# Patient Record
Sex: MALE | Race: WHITE | NOT HISPANIC OR LATINO | URBAN - METROPOLITAN AREA
[De-identification: names, ages, dates, MRNs, and addresses within clinical notes are randomized per-mention and may not be internally consistent; named-entity substitution may affect disease eponyms.]

---

## 2018-09-27 PROBLEM — Z00.00 ENCOUNTER FOR PREVENTIVE HEALTH EXAMINATION: Status: ACTIVE | Noted: 2018-09-27

## 2022-11-23 ENCOUNTER — INPATIENT (INPATIENT)
Facility: HOSPITAL | Age: 62
LOS: 13 days | Discharge: HOME | DRG: 219 | End: 2022-12-07
Attending: THORACIC SURGERY (CARDIOTHORACIC VASCULAR SURGERY) | Admitting: THORACIC SURGERY (CARDIOTHORACIC VASCULAR SURGERY)
Payer: COMMERCIAL

## 2022-11-23 VITALS
HEART RATE: 107 BPM | SYSTOLIC BLOOD PRESSURE: 142 MMHG | OXYGEN SATURATION: 95 % | RESPIRATION RATE: 18 BRPM | TEMPERATURE: 97 F | DIASTOLIC BLOOD PRESSURE: 83 MMHG

## 2022-11-23 LAB
A1C WITH ESTIMATED AVERAGE GLUCOSE RESULT: 6.1 % — HIGH (ref 4–5.6)
ALBUMIN SERPL ELPH-MCNC: 4.2 G/DL — SIGNIFICANT CHANGE UP (ref 3.3–5)
ALP SERPL-CCNC: 78 U/L — SIGNIFICANT CHANGE UP (ref 40–120)
ALT FLD-CCNC: 53 U/L — HIGH (ref 10–45)
ANION GAP SERPL CALC-SCNC: 10 MMOL/L — SIGNIFICANT CHANGE UP (ref 5–17)
APPEARANCE UR: CLEAR — SIGNIFICANT CHANGE UP
APTT BLD: 35.8 SEC — HIGH (ref 27.5–35.5)
AST SERPL-CCNC: 36 U/L — SIGNIFICANT CHANGE UP (ref 10–40)
BASOPHILS # BLD AUTO: 0.04 K/UL — SIGNIFICANT CHANGE UP (ref 0–0.2)
BASOPHILS NFR BLD AUTO: 0.5 % — SIGNIFICANT CHANGE UP (ref 0–2)
BILIRUB SERPL-MCNC: 0.8 MG/DL — SIGNIFICANT CHANGE UP (ref 0.2–1.2)
BILIRUB UR-MCNC: NEGATIVE — SIGNIFICANT CHANGE UP
BLD GP AB SCN SERPL QL: NEGATIVE — SIGNIFICANT CHANGE UP
BLD GP AB SCN SERPL QL: NEGATIVE — SIGNIFICANT CHANGE UP
BUN SERPL-MCNC: 20 MG/DL — SIGNIFICANT CHANGE UP (ref 7–23)
CALCIUM SERPL-MCNC: 9.6 MG/DL — SIGNIFICANT CHANGE UP (ref 8.4–10.5)
CHLORIDE SERPL-SCNC: 106 MMOL/L — SIGNIFICANT CHANGE UP (ref 96–108)
CHOLEST SERPL-MCNC: 157 MG/DL — SIGNIFICANT CHANGE UP
CK MB CFR SERPL CALC: 2.8 NG/ML — SIGNIFICANT CHANGE UP (ref 0–6.7)
CK SERPL-CCNC: 169 U/L — SIGNIFICANT CHANGE UP (ref 30–200)
CO2 SERPL-SCNC: 24 MMOL/L — SIGNIFICANT CHANGE UP (ref 22–31)
COLOR SPEC: YELLOW — SIGNIFICANT CHANGE UP
CREAT SERPL-MCNC: 1.11 MG/DL — SIGNIFICANT CHANGE UP (ref 0.5–1.3)
DIFF PNL FLD: NEGATIVE — SIGNIFICANT CHANGE UP
EGFR: 74 ML/MIN/1.73M2 — SIGNIFICANT CHANGE UP
EOSINOPHIL # BLD AUTO: 0.07 K/UL — SIGNIFICANT CHANGE UP (ref 0–0.5)
EOSINOPHIL NFR BLD AUTO: 0.8 % — SIGNIFICANT CHANGE UP (ref 0–6)
ESTIMATED AVERAGE GLUCOSE: 128 MG/DL — HIGH (ref 68–114)
GLUCOSE SERPL-MCNC: 122 MG/DL — HIGH (ref 70–99)
GLUCOSE UR QL: NEGATIVE — SIGNIFICANT CHANGE UP
HCT VFR BLD CALC: 43.7 % — SIGNIFICANT CHANGE UP (ref 39–50)
HDLC SERPL-MCNC: 37 MG/DL — LOW
HGB BLD-MCNC: 14.6 G/DL — SIGNIFICANT CHANGE UP (ref 13–17)
IMM GRANULOCYTES NFR BLD AUTO: 0.4 % — SIGNIFICANT CHANGE UP (ref 0–0.9)
INR BLD: 1.37 — HIGH (ref 0.88–1.16)
KETONES UR-MCNC: NEGATIVE — SIGNIFICANT CHANGE UP
LEUKOCYTE ESTERASE UR-ACNC: NEGATIVE — SIGNIFICANT CHANGE UP
LIPID PNL WITH DIRECT LDL SERPL: 102 MG/DL — HIGH
LYMPHOCYTES # BLD AUTO: 1.08 K/UL — SIGNIFICANT CHANGE UP (ref 1–3.3)
LYMPHOCYTES # BLD AUTO: 13 % — SIGNIFICANT CHANGE UP (ref 13–44)
MCHC RBC-ENTMCNC: 33.2 PG — SIGNIFICANT CHANGE UP (ref 27–34)
MCHC RBC-ENTMCNC: 33.4 GM/DL — SIGNIFICANT CHANGE UP (ref 32–36)
MCV RBC AUTO: 99.3 FL — SIGNIFICANT CHANGE UP (ref 80–100)
MONOCYTES # BLD AUTO: 0.52 K/UL — SIGNIFICANT CHANGE UP (ref 0–0.9)
MONOCYTES NFR BLD AUTO: 6.3 % — SIGNIFICANT CHANGE UP (ref 2–14)
NEUTROPHILS # BLD AUTO: 6.54 K/UL — SIGNIFICANT CHANGE UP (ref 1.8–7.4)
NEUTROPHILS NFR BLD AUTO: 79 % — HIGH (ref 43–77)
NITRITE UR-MCNC: NEGATIVE — SIGNIFICANT CHANGE UP
NON HDL CHOLESTEROL: 120 MG/DL — SIGNIFICANT CHANGE UP
NRBC # BLD: 0 /100 WBCS — SIGNIFICANT CHANGE UP (ref 0–0)
NT-PROBNP SERPL-SCNC: 626 PG/ML — HIGH (ref 0–300)
PH UR: 5.5 — SIGNIFICANT CHANGE UP (ref 5–8)
PLATELET # BLD AUTO: 189 K/UL — SIGNIFICANT CHANGE UP (ref 150–400)
POTASSIUM SERPL-MCNC: 4.3 MMOL/L — SIGNIFICANT CHANGE UP (ref 3.5–5.3)
POTASSIUM SERPL-SCNC: 4.3 MMOL/L — SIGNIFICANT CHANGE UP (ref 3.5–5.3)
PROT SERPL-MCNC: 6.8 G/DL — SIGNIFICANT CHANGE UP (ref 6–8.3)
PROT UR-MCNC: NEGATIVE MG/DL — SIGNIFICANT CHANGE UP
PROTHROM AB SERPL-ACNC: 16.3 SEC — HIGH (ref 10.5–13.4)
RBC # BLD: 4.4 M/UL — SIGNIFICANT CHANGE UP (ref 4.2–5.8)
RBC # FLD: 14.5 % — SIGNIFICANT CHANGE UP (ref 10.3–14.5)
RH IG SCN BLD-IMP: POSITIVE — SIGNIFICANT CHANGE UP
RH IG SCN BLD-IMP: POSITIVE — SIGNIFICANT CHANGE UP
SARS-COV-2 RNA SPEC QL NAA+PROBE: SIGNIFICANT CHANGE UP
SODIUM SERPL-SCNC: 140 MMOL/L — SIGNIFICANT CHANGE UP (ref 135–145)
SP GR SPEC: >=1.03 — SIGNIFICANT CHANGE UP (ref 1–1.03)
TRIGL SERPL-MCNC: 91 MG/DL — SIGNIFICANT CHANGE UP
TROPONIN T SERPL-MCNC: 0.01 NG/ML — SIGNIFICANT CHANGE UP (ref 0–0.01)
TSH SERPL-MCNC: 3.09 UIU/ML — SIGNIFICANT CHANGE UP (ref 0.27–4.2)
UROBILINOGEN FLD QL: 0.2 E.U./DL — SIGNIFICANT CHANGE UP
WBC # BLD: 8.28 K/UL — SIGNIFICANT CHANGE UP (ref 3.8–10.5)
WBC # FLD AUTO: 8.28 K/UL — SIGNIFICANT CHANGE UP (ref 3.8–10.5)

## 2022-11-23 PROCEDURE — 93010 ELECTROCARDIOGRAM REPORT: CPT

## 2022-11-23 PROCEDURE — 71045 X-RAY EXAM CHEST 1 VIEW: CPT | Mod: 26

## 2022-11-23 PROCEDURE — 99223 1ST HOSP IP/OBS HIGH 75: CPT

## 2022-11-23 RX ORDER — ISOSORBIDE MONONITRATE 60 MG/1
30 TABLET, EXTENDED RELEASE ORAL DAILY
Refills: 0 | Status: DISCONTINUED | OUTPATIENT
Start: 2022-11-23 | End: 2022-11-24

## 2022-11-23 RX ORDER — FUROSEMIDE 40 MG
40 TABLET ORAL DAILY
Refills: 0 | Status: DISCONTINUED | OUTPATIENT
Start: 2022-11-24 | End: 2022-11-24

## 2022-11-23 RX ORDER — POLYETHYLENE GLYCOL 3350 17 G/17G
17 POWDER, FOR SOLUTION ORAL DAILY
Refills: 0 | Status: DISCONTINUED | OUTPATIENT
Start: 2022-11-23 | End: 2022-11-23

## 2022-11-23 RX ORDER — ATORVASTATIN CALCIUM 80 MG/1
80 TABLET, FILM COATED ORAL AT BEDTIME
Refills: 0 | Status: DISCONTINUED | OUTPATIENT
Start: 2022-11-23 | End: 2022-11-28

## 2022-11-23 RX ORDER — POTASSIUM CHLORIDE 20 MEQ
20 PACKET (EA) ORAL DAILY
Refills: 0 | Status: DISCONTINUED | OUTPATIENT
Start: 2022-11-24 | End: 2022-11-25

## 2022-11-23 RX ORDER — METOPROLOL TARTRATE 50 MG
12.5 TABLET ORAL EVERY 12 HOURS
Refills: 0 | Status: DISCONTINUED | OUTPATIENT
Start: 2022-11-23 | End: 2022-11-23

## 2022-11-23 RX ORDER — PANTOPRAZOLE SODIUM 20 MG/1
40 TABLET, DELAYED RELEASE ORAL
Refills: 0 | Status: DISCONTINUED | OUTPATIENT
Start: 2022-11-23 | End: 2022-11-28

## 2022-11-23 RX ORDER — SODIUM CHLORIDE 9 MG/ML
3 INJECTION INTRAMUSCULAR; INTRAVENOUS; SUBCUTANEOUS EVERY 8 HOURS
Refills: 0 | Status: DISCONTINUED | OUTPATIENT
Start: 2022-11-23 | End: 2022-11-28

## 2022-11-23 RX ORDER — ASPIRIN/CALCIUM CARB/MAGNESIUM 324 MG
81 TABLET ORAL DAILY
Refills: 0 | Status: DISCONTINUED | OUTPATIENT
Start: 2022-11-23 | End: 2022-11-28

## 2022-11-23 RX ORDER — SENNA PLUS 8.6 MG/1
2 TABLET ORAL AT BEDTIME
Refills: 0 | Status: DISCONTINUED | OUTPATIENT
Start: 2022-11-23 | End: 2022-11-23

## 2022-11-23 RX ORDER — ENOXAPARIN SODIUM 100 MG/ML
120 INJECTION SUBCUTANEOUS EVERY 12 HOURS
Refills: 0 | Status: DISCONTINUED | OUTPATIENT
Start: 2022-11-23 | End: 2022-11-24

## 2022-11-23 RX ORDER — INFLUENZA VIRUS VACCINE 15; 15; 15; 15 UG/.5ML; UG/.5ML; UG/.5ML; UG/.5ML
0.5 SUSPENSION INTRAMUSCULAR ONCE
Refills: 0 | Status: DISCONTINUED | OUTPATIENT
Start: 2022-11-23 | End: 2022-11-28

## 2022-11-23 RX ORDER — ALPRAZOLAM 0.25 MG
0.5 TABLET ORAL AT BEDTIME
Refills: 0 | Status: DISCONTINUED | OUTPATIENT
Start: 2022-11-23 | End: 2022-11-24

## 2022-11-23 RX ORDER — METOPROLOL TARTRATE 50 MG
25 TABLET ORAL EVERY 12 HOURS
Refills: 0 | Status: DISCONTINUED | OUTPATIENT
Start: 2022-11-23 | End: 2022-11-25

## 2022-11-23 RX ORDER — HEPARIN SODIUM 5000 [USP'U]/ML
5000 INJECTION INTRAVENOUS; SUBCUTANEOUS EVERY 8 HOURS
Refills: 0 | Status: DISCONTINUED | OUTPATIENT
Start: 2022-11-23 | End: 2022-11-23

## 2022-11-23 RX ADMIN — ISOSORBIDE MONONITRATE 30 MILLIGRAM(S): 60 TABLET, EXTENDED RELEASE ORAL at 23:11

## 2022-11-23 RX ADMIN — ATORVASTATIN CALCIUM 80 MILLIGRAM(S): 80 TABLET, FILM COATED ORAL at 23:11

## 2022-11-23 RX ADMIN — Medication 25 MILLIGRAM(S): at 23:15

## 2022-11-23 RX ADMIN — SODIUM CHLORIDE 3 MILLILITER(S): 9 INJECTION INTRAMUSCULAR; INTRAVENOUS; SUBCUTANEOUS at 22:26

## 2022-11-23 RX ADMIN — Medication 0.5 MILLIGRAM(S): at 23:48

## 2022-11-23 NOTE — PATIENT PROFILE ADULT - FALL HARM RISK - HARM RISK INTERVENTIONS

## 2022-11-24 DIAGNOSIS — I34.0 NONRHEUMATIC MITRAL (VALVE) INSUFFICIENCY: ICD-10-CM

## 2022-11-24 DIAGNOSIS — I10 ESSENTIAL (PRIMARY) HYPERTENSION: ICD-10-CM

## 2022-11-24 DIAGNOSIS — I25.10 ATHEROSCLEROTIC HEART DISEASE OF NATIVE CORONARY ARTERY WITHOUT ANGINA PECTORIS: ICD-10-CM

## 2022-11-24 DIAGNOSIS — F41.9 ANXIETY DISORDER, UNSPECIFIED: ICD-10-CM

## 2022-11-24 DIAGNOSIS — E66.01 MORBID (SEVERE) OBESITY DUE TO EXCESS CALORIES: ICD-10-CM

## 2022-11-24 DIAGNOSIS — I50.21 ACUTE SYSTOLIC (CONGESTIVE) HEART FAILURE: ICD-10-CM

## 2022-11-24 DIAGNOSIS — I21.4 NON-ST ELEVATION (NSTEMI) MYOCARDIAL INFARCTION: ICD-10-CM

## 2022-11-24 DIAGNOSIS — I48.92 UNSPECIFIED ATRIAL FLUTTER: ICD-10-CM

## 2022-11-24 LAB
ANION GAP SERPL CALC-SCNC: 6 MMOL/L — SIGNIFICANT CHANGE UP (ref 5–17)
APTT BLD: 51.5 SEC — HIGH (ref 27.5–35.5)
APTT BLD: 53.4 SEC — HIGH (ref 27.5–35.5)
BUN SERPL-MCNC: 20 MG/DL — SIGNIFICANT CHANGE UP (ref 7–23)
CALCIUM SERPL-MCNC: 9.3 MG/DL — SIGNIFICANT CHANGE UP (ref 8.4–10.5)
CHLORIDE SERPL-SCNC: 105 MMOL/L — SIGNIFICANT CHANGE UP (ref 96–108)
CO2 SERPL-SCNC: 29 MMOL/L — SIGNIFICANT CHANGE UP (ref 22–31)
CREAT SERPL-MCNC: 1.24 MG/DL — SIGNIFICANT CHANGE UP (ref 0.5–1.3)
EGFR: 65 ML/MIN/1.73M2 — SIGNIFICANT CHANGE UP
GLUCOSE BLDC GLUCOMTR-MCNC: 115 MG/DL — HIGH (ref 70–99)
GLUCOSE BLDC GLUCOMTR-MCNC: 119 MG/DL — HIGH (ref 70–99)
GLUCOSE SERPL-MCNC: 115 MG/DL — HIGH (ref 70–99)
HCT VFR BLD CALC: 41.4 % — SIGNIFICANT CHANGE UP (ref 39–50)
HGB BLD-MCNC: 13.9 G/DL — SIGNIFICANT CHANGE UP (ref 13–17)
MAGNESIUM SERPL-MCNC: 2.1 MG/DL — SIGNIFICANT CHANGE UP (ref 1.6–2.6)
MCHC RBC-ENTMCNC: 33.3 PG — SIGNIFICANT CHANGE UP (ref 27–34)
MCHC RBC-ENTMCNC: 33.6 GM/DL — SIGNIFICANT CHANGE UP (ref 32–36)
MCV RBC AUTO: 99 FL — SIGNIFICANT CHANGE UP (ref 80–100)
NRBC # BLD: 0 /100 WBCS — SIGNIFICANT CHANGE UP (ref 0–0)
PHOSPHATE SERPL-MCNC: 4.1 MG/DL — SIGNIFICANT CHANGE UP (ref 2.5–4.5)
PLATELET # BLD AUTO: 167 K/UL — SIGNIFICANT CHANGE UP (ref 150–400)
POTASSIUM SERPL-MCNC: 4.6 MMOL/L — SIGNIFICANT CHANGE UP (ref 3.5–5.3)
POTASSIUM SERPL-SCNC: 4.6 MMOL/L — SIGNIFICANT CHANGE UP (ref 3.5–5.3)
RBC # BLD: 4.18 M/UL — LOW (ref 4.2–5.8)
RBC # FLD: 14.6 % — HIGH (ref 10.3–14.5)
SODIUM SERPL-SCNC: 140 MMOL/L — SIGNIFICANT CHANGE UP (ref 135–145)
WBC # BLD: 7.97 K/UL — SIGNIFICANT CHANGE UP (ref 3.8–10.5)
WBC # FLD AUTO: 7.97 K/UL — SIGNIFICANT CHANGE UP (ref 3.8–10.5)

## 2022-11-24 PROCEDURE — 93880 EXTRACRANIAL BILAT STUDY: CPT | Mod: 26

## 2022-11-24 RX ORDER — DEXTROSE 50 % IN WATER 50 %
25 SYRINGE (ML) INTRAVENOUS ONCE
Refills: 0 | Status: DISCONTINUED | OUTPATIENT
Start: 2022-11-24 | End: 2022-11-28

## 2022-11-24 RX ORDER — SODIUM CHLORIDE 9 MG/ML
1000 INJECTION, SOLUTION INTRAVENOUS
Refills: 0 | Status: DISCONTINUED | OUTPATIENT
Start: 2022-11-24 | End: 2022-11-28

## 2022-11-24 RX ORDER — AMIODARONE HYDROCHLORIDE 400 MG/1
400 TABLET ORAL EVERY 8 HOURS
Refills: 0 | Status: COMPLETED | OUTPATIENT
Start: 2022-11-24 | End: 2022-11-28

## 2022-11-24 RX ORDER — SENNA PLUS 8.6 MG/1
2 TABLET ORAL AT BEDTIME
Refills: 0 | Status: DISCONTINUED | OUTPATIENT
Start: 2022-11-24 | End: 2022-11-28

## 2022-11-24 RX ORDER — AMIODARONE HYDROCHLORIDE 400 MG/1
200 TABLET ORAL DAILY
Refills: 0 | Status: DISCONTINUED | OUTPATIENT
Start: 2022-11-28 | End: 2022-11-28

## 2022-11-24 RX ORDER — FUROSEMIDE 40 MG
20 TABLET ORAL EVERY 12 HOURS
Refills: 0 | Status: DISCONTINUED | OUTPATIENT
Start: 2022-11-24 | End: 2022-11-24

## 2022-11-24 RX ORDER — INSULIN LISPRO 100/ML
VIAL (ML) SUBCUTANEOUS
Refills: 0 | Status: DISCONTINUED | OUTPATIENT
Start: 2022-11-24 | End: 2022-11-28

## 2022-11-24 RX ORDER — ALPRAZOLAM 0.25 MG
0.5 TABLET ORAL AT BEDTIME
Refills: 0 | Status: DISCONTINUED | OUTPATIENT
Start: 2022-11-24 | End: 2022-11-28

## 2022-11-24 RX ORDER — GLUCAGON INJECTION, SOLUTION 0.5 MG/.1ML
1 INJECTION, SOLUTION SUBCUTANEOUS ONCE
Refills: 0 | Status: DISCONTINUED | OUTPATIENT
Start: 2022-11-24 | End: 2022-11-28

## 2022-11-24 RX ORDER — AMIODARONE HYDROCHLORIDE 400 MG/1
150 TABLET ORAL ONCE
Refills: 0 | Status: COMPLETED | OUTPATIENT
Start: 2022-11-24 | End: 2022-11-24

## 2022-11-24 RX ORDER — HEPARIN SODIUM 5000 [USP'U]/ML
1200 INJECTION INTRAVENOUS; SUBCUTANEOUS
Qty: 25000 | Refills: 0 | Status: DISCONTINUED | OUTPATIENT
Start: 2022-11-24 | End: 2022-11-28

## 2022-11-24 RX ORDER — AMIODARONE HYDROCHLORIDE 400 MG/1
TABLET ORAL
Refills: 0 | Status: DISCONTINUED | OUTPATIENT
Start: 2022-11-24 | End: 2022-11-28

## 2022-11-24 RX ORDER — DEXTROSE 50 % IN WATER 50 %
15 SYRINGE (ML) INTRAVENOUS ONCE
Refills: 0 | Status: DISCONTINUED | OUTPATIENT
Start: 2022-11-24 | End: 2022-11-28

## 2022-11-24 RX ORDER — FUROSEMIDE 40 MG
20 TABLET ORAL EVERY 12 HOURS
Refills: 0 | Status: DISCONTINUED | OUTPATIENT
Start: 2022-11-24 | End: 2022-11-26

## 2022-11-24 RX ORDER — DEXTROSE 50 % IN WATER 50 %
12.5 SYRINGE (ML) INTRAVENOUS ONCE
Refills: 0 | Status: DISCONTINUED | OUTPATIENT
Start: 2022-11-24 | End: 2022-11-28

## 2022-11-24 RX ORDER — MAGNESIUM OXIDE 400 MG ORAL TABLET 241.3 MG
400 TABLET ORAL DAILY
Refills: 0 | Status: DISCONTINUED | OUTPATIENT
Start: 2022-11-24 | End: 2022-11-28

## 2022-11-24 RX ADMIN — ENOXAPARIN SODIUM 120 MILLIGRAM(S): 100 INJECTION SUBCUTANEOUS at 00:44

## 2022-11-24 RX ADMIN — HEPARIN SODIUM 12.5 UNIT(S)/HR: 5000 INJECTION INTRAVENOUS; SUBCUTANEOUS at 18:39

## 2022-11-24 RX ADMIN — Medication 25 MILLIGRAM(S): at 22:03

## 2022-11-24 RX ADMIN — Medication 25 MILLIGRAM(S): at 10:01

## 2022-11-24 RX ADMIN — PANTOPRAZOLE SODIUM 40 MILLIGRAM(S): 20 TABLET, DELAYED RELEASE ORAL at 06:51

## 2022-11-24 RX ADMIN — Medication 0.5 MILLIGRAM(S): at 10:26

## 2022-11-24 RX ADMIN — SODIUM CHLORIDE 3 MILLILITER(S): 9 INJECTION INTRAMUSCULAR; INTRAVENOUS; SUBCUTANEOUS at 06:47

## 2022-11-24 RX ADMIN — ATORVASTATIN CALCIUM 80 MILLIGRAM(S): 80 TABLET, FILM COATED ORAL at 22:03

## 2022-11-24 RX ADMIN — Medication 0.5 MILLIGRAM(S): at 22:02

## 2022-11-24 RX ADMIN — HEPARIN SODIUM 12 UNIT(S)/HR: 5000 INJECTION INTRAVENOUS; SUBCUTANEOUS at 12:01

## 2022-11-24 RX ADMIN — Medication 20 MILLIGRAM(S): at 17:43

## 2022-11-24 RX ADMIN — SODIUM CHLORIDE 3 MILLILITER(S): 9 INJECTION INTRAMUSCULAR; INTRAVENOUS; SUBCUTANEOUS at 21:12

## 2022-11-24 RX ADMIN — Medication 20 MILLIGRAM(S): at 10:35

## 2022-11-24 RX ADMIN — AMIODARONE HYDROCHLORIDE 133.33 MILLIGRAM(S): 400 TABLET ORAL at 11:07

## 2022-11-24 RX ADMIN — Medication 81 MILLIGRAM(S): at 11:43

## 2022-11-24 RX ADMIN — SODIUM CHLORIDE 3 MILLILITER(S): 9 INJECTION INTRAMUSCULAR; INTRAVENOUS; SUBCUTANEOUS at 13:56

## 2022-11-24 RX ADMIN — AMIODARONE HYDROCHLORIDE 400 MILLIGRAM(S): 400 TABLET ORAL at 22:03

## 2022-11-24 RX ADMIN — AMIODARONE HYDROCHLORIDE 400 MILLIGRAM(S): 400 TABLET ORAL at 14:55

## 2022-11-24 NOTE — H&P ADULT - NSHPLABSRESULTS_GEN_ALL_CORE
11-23    140  |  106  |  20  ----------------------------<  122<H>  4.3   |  24  |  1.11    Ca    9.6      23 Nov 2022 18:53    TPro  6.8  /  Alb  4.2  /  TBili  0.8  /  DBili  x   /  AST  36  /  ALT  53<H>  /  AlkPhos  78  11-23      CBC Full  -  ( 23 Nov 2022 18:53 )  WBC Count : 8.28 K/uL  RBC Count : 4.40 M/uL  Hemoglobin : 14.6 g/dL  Hematocrit : 43.7 %  Platelet Count - Automated : 189 K/uL  Mean Cell Volume : 99.3 fl  Mean Cell Hemoglobin : 33.2 pg  Mean Cell Hemoglobin Concentration : 33.4 gm/dL  Auto Neutrophil # : 6.54 K/uL  Auto Lymphocyte # : 1.08 K/uL  Auto Monocyte # : 0.52 K/uL  Auto Eosinophil # : 0.07 K/uL  Auto Basophil # : 0.04 K/uL  Auto Neutrophil % : 79.0 %  Auto Lymphocyte % : 13.0 %  Auto Monocyte % : 6.3 %  Auto Eosinophil % : 0.8 %  Auto Basophil % : 0.5 %

## 2022-11-24 NOTE — H&P ADULT - HISTORY OF PRESENT ILLNESS
61 y/o male PMH anxiety, morbid obesity, "fast heart rate" (not on blood thinners) who c/o SOB, N&V, night sweats and  palpitations for the last few days which progressively worsened. PT was brought in by EMS to [facility] found to be in rapid atrial fibrillation -200, HTN with expiratory wheezing. Given Cardizem and albuterol which decreased to HR and wheezing. Placed on heparin drip at that time and given lasix after CXR showed pulmonary edema. Troponin found to be elevated diagnosed with NSTEMI. Subsequent cardiac cath showed 2 vessel CAD mLAD 60% mCX 50% with EF 40% and medical management recommended. Subsequent OLEG showed severe central MR and was transferred to [facility] under Dr. Reilly for surgical evaluation and management.

## 2022-11-24 NOTE — H&P ADULT - PROBLEM SELECTOR PLAN 1
admit to 9L under Dr. Reilly  - full labs  - full dose Lovenox for AF and ACS  - EP consultation  - heart failure consultation  - pre-op for mitral valve repair/replacement and possible cryomaze/CHANDRIKA occlusion

## 2022-11-24 NOTE — PROGRESS NOTE ADULT - SUBJECTIVE AND OBJECTIVE BOX
Patient discussed on morning rounds with Dr. Rosales     Operation / Date: severe MR emiliana, nonobstructive CAD    SUBJECTIVE ASSESSMENT:  62y Male seen and examined. Patient worried about upcoming procedure and testing, however, understands importance of completing preop workup.       Vital Signs Last 24 Hrs  T(C): 36.5 (24 Nov 2022 09:28), Max: 36.5 (24 Nov 2022 09:28)  T(F): 97.7 (24 Nov 2022 09:28), Max: 97.7 (24 Nov 2022 09:28)  HR: 102 (24 Nov 2022 13:10) (96 - 122)  BP: 107/69 (24 Nov 2022 13:10) (107/69 - 160/87)  BP(mean): 82 (24 Nov 2022 13:10) (82 - 118)  RR: 18 (24 Nov 2022 13:10) (18 - 18)  SpO2: 95% (24 Nov 2022 13:10) (93% - 95%)    Parameters below as of 24 Nov 2022 13:10  Patient On (Oxygen Delivery Method): room air      I&O's Detail    23 Nov 2022 07:01  -  24 Nov 2022 07:00  --------------------------------------------------------  IN:  Total IN: 0 mL    OUT:    Voided (mL): 600 mL  Total OUT: 600 mL    Total NET: -600 mL      24 Nov 2022 07:01  -  24 Nov 2022 16:39  --------------------------------------------------------  IN:    Heparin: 60 mL    IV PiggyBack: 100 mL  Total IN: 160 mL    OUT:    Voided (mL): 1450 mL  Total OUT: 1450 mL    Total NET: -1290 mL      CHEST TUBE: no  JUSTUS DRAIN:  No.  EPICARDIAL WIRES: No.  TIE DOWNS: No.  HUERTA: No.    PHYSICAL EXAM:    General: NAD, sitting comfortably in chair, conversing appropriately  Neurological: alert and oriented, UE and LE strength equal b/l, facial symmetry present  Cardiovascular: RRR, Clear S1 and S2, +systolic murmur  Respiratory: chest expansion symmetrical, CTA b/l, no wheezing noted  Gastrointestinal: +BS, soft, NT, ND  Extremities: moving spontaneously, no calf tenderness or edema.  Vascular: warm, well perfused. DP/PT pulses palpable b/l.  Incisions: none    LABS:                        13.9   7.97  )-----------( 167      ( 24 Nov 2022 06:52 )             41.4       COUMADIN: no    PT/INR - ( 23 Nov 2022 18:53 )   PT: 16.3 sec;   INR: 1.37          PTT - ( 23 Nov 2022 18:53 )  PTT:35.8 sec    11-24    140  |  105  |  20  ----------------------------<  115<H>  4.6   |  29  |  1.24    Ca    9.3      24 Nov 2022 06:52  Phos  4.1     11-24  Mg     2.1     11-24    TPro  6.8  /  Alb  4.2  /  TBili  0.8  /  DBili  x   /  AST  36  /  ALT  53<H>  /  AlkPhos  78  11-23      Urinalysis Basic - ( 23 Nov 2022 21:17 )    Color: Yellow / Appearance: Clear / SG: >=1.030 / pH: x  Gluc: x / Ketone: NEGATIVE  / Bili: Negative / Urobili: 0.2 E.U./dL   Blood: x / Protein: NEGATIVE mg/dL / Nitrite: NEGATIVE   Leuk Esterase: NEGATIVE / RBC: x / WBC x   Sq Epi: x / Non Sq Epi: x / Bacteria: x        MEDICATIONS  (STANDING):  aMIOdarone    Tablet   Oral   aMIOdarone    Tablet 400 milliGRAM(s) Oral every 8 hours  aspirin  chewable 81 milliGRAM(s) Oral daily  atorvastatin 80 milliGRAM(s) Oral at bedtime  furosemide   Injectable 20 milliGRAM(s) IV Push every 12 hours  heparin  Infusion 1200 Unit(s)/Hr (12 mL/Hr) IV Continuous <Continuous>  influenza   Vaccine 0.5 milliLiter(s) IntraMuscular once  magnesium oxide 400 milliGRAM(s) Oral daily  metoprolol tartrate 25 milliGRAM(s) Oral every 12 hours  pantoprazole    Tablet 40 milliGRAM(s) Oral before breakfast  potassium chloride    Tablet ER 20 milliEquivalent(s) Oral daily  sodium chloride 0.9% lock flush 3 milliLiter(s) IV Push every 8 hours    MEDICATIONS  (PRN):  ALPRAZolam 0.5 milliGRAM(s) Oral at bedtime PRN anxiety/sleep        RADIOLOGY & ADDITIONAL TESTS:    < from: US Duplex Carotid Arteries Complete, Bilateral (11.24.22 @ 15:04) >  Antegrade flow is noted within both vertebral arteries.    IMPRESSION: No significant hemodynamic stenosisof either carotid artery.    Measurement of carotid stenosis is based on velocity parameters that   correlate the residual internal carotid diameter with that of the more   distal vessel in accordance with a method such as the [place] American   Symptomatic Carotid Endarterectomy Trial (NASCET).    < end of copied text >  < from: Xray Chest 1 View AP/PA (11.23.22 @ 19:19) >  IMPRESSION:  Clearlungs.    < end of copied text >

## 2022-11-24 NOTE — PROGRESS NOTE ADULT - ASSESSMENT
63 y/o male PMH anxiety, morbid obesity, "fast heart rate" (not on blood thinners) who c/o SOB, N&V, night sweats and  palpitations for the last few days which progressively worsened. Patient was brought by EMS to [facility] and found to be in rapid atrial fibrillation -200, HTN with expiratory wheezing. Given Cardizem and albuterol with improvement. Placed on heparin drip at that time and given lasix after CXR showed pulmonary edema. Troponin found to be elevated, ruled in for NSTEMI. Cardiac cath completed and showed 2 vessel CAD mLAD 60% mCX 50% with EF 40% and medical management recommended. OLEG at OSH showed severe central MR. Patient was transferred to [facility] under the care of Dr. Reilly for surgical evaluation of his MR. Heparin gtt started, IV lasix started, amio loaded. Plan for TTE tomorrow. Likely OR on Monday for MV repair/replacement.    Plan:    Neurovascular:   -no current pain  anxiety  -continue xanax    Cardiovascular:   severe MR  -repeat TTE tomorrow  -continue IV lasix  afib  -continue heparin gtt  -continue PO amio load  -Hemodynamically stable.   -Monitor: BP, HR, tele    Respiratory:   -Oxygenating well on room air  -Encourage continued use of IS 10x/hr and frequent ambulation    GI:  -GI PPX: continue protonix  -PO Diet  -Bowel Regimen: start senna    Renal / :  -Continue to monitor renal function: BUN/Cr: 20/1.24  -Monitor I/O's daily     Endocrine:    DM  -A1c: 6.1  -start MISS  no hx thyroid disease  -TSH: 3.090    Hematologic:  -CBC: H/H- 13.9/41.4  -Coagulation Panel.    ID:  -Temperature: afebrile  -CBC: WBC- 7.97  -Continue to observe for SIRS/Sepsis Syndrome.    Prophylaxis:  -DVT prophylaxis with heparin gtt  -Continue with SCD's b/l while patient is at rest     Disposition:  -OR evaluation underway

## 2022-11-25 PROBLEM — R00.2 PALPITATIONS: Chronic | Status: ACTIVE | Noted: 2022-11-24

## 2022-11-25 PROBLEM — F41.9 ANXIETY DISORDER, UNSPECIFIED: Chronic | Status: ACTIVE | Noted: 2022-11-24

## 2022-11-25 PROBLEM — E66.01 MORBID (SEVERE) OBESITY DUE TO EXCESS CALORIES: Chronic | Status: ACTIVE | Noted: 2022-11-24

## 2022-11-25 LAB
ANION GAP SERPL CALC-SCNC: 10 MMOL/L — SIGNIFICANT CHANGE UP (ref 5–17)
APTT BLD: 63.2 SEC — HIGH (ref 27.5–35.5)
APTT BLD: 64.4 SEC — HIGH (ref 27.5–35.5)
APTT BLD: 68.4 SEC — HIGH (ref 27.5–35.5)
BUN SERPL-MCNC: 17 MG/DL — SIGNIFICANT CHANGE UP (ref 7–23)
CALCIUM SERPL-MCNC: 9.1 MG/DL — SIGNIFICANT CHANGE UP (ref 8.4–10.5)
CHLORIDE SERPL-SCNC: 104 MMOL/L — SIGNIFICANT CHANGE UP (ref 96–108)
CO2 SERPL-SCNC: 26 MMOL/L — SIGNIFICANT CHANGE UP (ref 22–31)
CREAT SERPL-MCNC: 1.13 MG/DL — SIGNIFICANT CHANGE UP (ref 0.5–1.3)
EGFR: 73 ML/MIN/1.73M2 — SIGNIFICANT CHANGE UP
GLUCOSE BLDC GLUCOMTR-MCNC: 105 MG/DL — HIGH (ref 70–99)
GLUCOSE BLDC GLUCOMTR-MCNC: 116 MG/DL — HIGH (ref 70–99)
GLUCOSE BLDC GLUCOMTR-MCNC: 133 MG/DL — HIGH (ref 70–99)
GLUCOSE BLDC GLUCOMTR-MCNC: 145 MG/DL — HIGH (ref 70–99)
GLUCOSE SERPL-MCNC: 136 MG/DL — HIGH (ref 70–99)
HCT VFR BLD CALC: 43.9 % — SIGNIFICANT CHANGE UP (ref 39–50)
HCV AB S/CO SERPL IA: 0.04 S/CO — SIGNIFICANT CHANGE UP
HCV AB SERPL-IMP: SIGNIFICANT CHANGE UP
HGB BLD-MCNC: 14.8 G/DL — SIGNIFICANT CHANGE UP (ref 13–17)
INR BLD: 1.17 — HIGH (ref 0.88–1.16)
MAGNESIUM SERPL-MCNC: 2.1 MG/DL — SIGNIFICANT CHANGE UP (ref 1.6–2.6)
MCHC RBC-ENTMCNC: 33 PG — SIGNIFICANT CHANGE UP (ref 27–34)
MCHC RBC-ENTMCNC: 33.7 GM/DL — SIGNIFICANT CHANGE UP (ref 32–36)
MCV RBC AUTO: 98 FL — SIGNIFICANT CHANGE UP (ref 80–100)
NRBC # BLD: 0 /100 WBCS — SIGNIFICANT CHANGE UP (ref 0–0)
PLATELET # BLD AUTO: 171 K/UL — SIGNIFICANT CHANGE UP (ref 150–400)
POTASSIUM SERPL-MCNC: 3.6 MMOL/L — SIGNIFICANT CHANGE UP (ref 3.5–5.3)
POTASSIUM SERPL-SCNC: 3.6 MMOL/L — SIGNIFICANT CHANGE UP (ref 3.5–5.3)
PROTHROM AB SERPL-ACNC: 14 SEC — HIGH (ref 10.5–13.4)
RBC # BLD: 4.48 M/UL — SIGNIFICANT CHANGE UP (ref 4.2–5.8)
RBC # FLD: 14.4 % — SIGNIFICANT CHANGE UP (ref 10.3–14.5)
SODIUM SERPL-SCNC: 140 MMOL/L — SIGNIFICANT CHANGE UP (ref 135–145)
WBC # BLD: 8.77 K/UL — SIGNIFICANT CHANGE UP (ref 3.8–10.5)
WBC # FLD AUTO: 8.77 K/UL — SIGNIFICANT CHANGE UP (ref 3.8–10.5)

## 2022-11-25 PROCEDURE — 93306 TTE W/DOPPLER COMPLETE: CPT | Mod: 26

## 2022-11-25 PROCEDURE — 99232 SBSQ HOSP IP/OBS MODERATE 35: CPT

## 2022-11-25 PROCEDURE — 71046 X-RAY EXAM CHEST 2 VIEWS: CPT | Mod: 26

## 2022-11-25 RX ORDER — POTASSIUM CHLORIDE 20 MEQ
40 PACKET (EA) ORAL ONCE
Refills: 0 | Status: COMPLETED | OUTPATIENT
Start: 2022-11-25 | End: 2022-11-25

## 2022-11-25 RX ORDER — METOPROLOL TARTRATE 50 MG
37.5 TABLET ORAL EVERY 12 HOURS
Refills: 0 | Status: DISCONTINUED | OUTPATIENT
Start: 2022-11-26 | End: 2022-11-26

## 2022-11-25 RX ORDER — METOPROLOL TARTRATE 50 MG
12.5 TABLET ORAL ONCE
Refills: 0 | Status: COMPLETED | OUTPATIENT
Start: 2022-11-25 | End: 2022-11-25

## 2022-11-25 RX ADMIN — Medication 20 MILLIGRAM(S): at 05:26

## 2022-11-25 RX ADMIN — Medication 20 MILLIGRAM(S): at 17:50

## 2022-11-25 RX ADMIN — ATORVASTATIN CALCIUM 80 MILLIGRAM(S): 80 TABLET, FILM COATED ORAL at 22:20

## 2022-11-25 RX ADMIN — Medication 81 MILLIGRAM(S): at 11:35

## 2022-11-25 RX ADMIN — Medication 0.5 MILLIGRAM(S): at 22:20

## 2022-11-25 RX ADMIN — AMIODARONE HYDROCHLORIDE 400 MILLIGRAM(S): 400 TABLET ORAL at 14:20

## 2022-11-25 RX ADMIN — SODIUM CHLORIDE 3 MILLILITER(S): 9 INJECTION INTRAMUSCULAR; INTRAVENOUS; SUBCUTANEOUS at 22:20

## 2022-11-25 RX ADMIN — Medication 12.5 MILLIGRAM(S): at 18:30

## 2022-11-25 RX ADMIN — Medication 25 MILLIGRAM(S): at 11:35

## 2022-11-25 RX ADMIN — AMIODARONE HYDROCHLORIDE 400 MILLIGRAM(S): 400 TABLET ORAL at 22:20

## 2022-11-25 RX ADMIN — SODIUM CHLORIDE 3 MILLILITER(S): 9 INJECTION INTRAMUSCULAR; INTRAVENOUS; SUBCUTANEOUS at 13:25

## 2022-11-25 RX ADMIN — MAGNESIUM OXIDE 400 MG ORAL TABLET 400 MILLIGRAM(S): 241.3 TABLET ORAL at 11:35

## 2022-11-25 RX ADMIN — PANTOPRAZOLE SODIUM 40 MILLIGRAM(S): 20 TABLET, DELAYED RELEASE ORAL at 05:26

## 2022-11-25 RX ADMIN — HEPARIN SODIUM 13.5 UNIT(S)/HR: 5000 INJECTION INTRAVENOUS; SUBCUTANEOUS at 05:32

## 2022-11-25 RX ADMIN — Medication 40 MILLIEQUIVALENT(S): at 07:44

## 2022-11-25 RX ADMIN — SODIUM CHLORIDE 3 MILLILITER(S): 9 INJECTION INTRAMUSCULAR; INTRAVENOUS; SUBCUTANEOUS at 05:27

## 2022-11-25 RX ADMIN — AMIODARONE HYDROCHLORIDE 400 MILLIGRAM(S): 400 TABLET ORAL at 05:26

## 2022-11-25 NOTE — PROGRESS NOTE ADULT - SUBJECTIVE AND OBJECTIVE BOX
Patient discussed on morning rounds with Dr. Rosales    Operation / Date: preop MVR for Monday    SUBJECTIVE ASSESSMENT:  62y Male seen and examined. Nervous about upcoming surgery and not sleeping well. otherwise feels well, has no complaints. Tolerating PO diet, satting well on room air, ambulating independently. Denies lightheadedness, headache, CP, SOB, abdominal pain, nausea, vomiting, fever, chills.      Vital Signs Last 24 Hrs  T(C): 35.8 (25 Nov 2022 05:22), Max: 36.5 (24 Nov 2022 09:28)  T(F): 96.5 (25 Nov 2022 05:22), Max: 97.7 (24 Nov 2022 09:28)  HR: 94 (25 Nov 2022 05:22) (94 - 122)  BP: 126/83 (25 Nov 2022 05:22) (107/69 - 144/69)  BP(mean): 99 (25 Nov 2022 05:22) (82 - 118)  RR: 18 (25 Nov 2022 05:22) (18 - 18)  SpO2: 94% (25 Nov 2022 05:22) (94% - 95%)    Parameters below as of 25 Nov 2022 05:22  Patient On (Oxygen Delivery Method): room air      I&O's Detail    24 Nov 2022 07:01  -  25 Nov 2022 07:00  --------------------------------------------------------  IN:    Heparin: 256 mL    IV PiggyBack: 100 mL  Total IN: 356 mL    OUT:    Voided (mL): 3250 mL  Total OUT: 3250 mL    Total NET: -2894 mL      25 Nov 2022 07:01  -  25 Nov 2022 08:46  --------------------------------------------------------  IN:  Total IN: 0 mL    OUT:    Voided (mL): 750 mL  Total OUT: 750 mL    Total NET: -750 mL      CHEST TUBE: NO  JUSTUS DRAIN:  No.  EPICARDIAL WIRES: No.  TIE DOWNS: No.  HUERTA: No.    PHYSICAL EXAM:    General: NAD, sitting comfortably in chair, conversing appropriately  Neurological: alert and oriented, UE and LE strength equal b/l, facial symmetry present  Cardiovascular: RRR, Clear S1 and S2, no murmurs appreciated  Respiratory: chest expansion symmetrical, CTA b/l, no wheezing noted  Gastrointestinal: +BS, soft, NT, ND  Extremities: moving spontaneously, no calf tenderness or edema.  Vascular: warm, well perfused. DP/PT pulses palpable b/l.  Incisions: none    LABS:                        14.8   8.77  )-----------( 171      ( 25 Nov 2022 06:30 )             43.9       COUMADIN: no    PT/INR - ( 25 Nov 2022 06:30 )   PT: 14.0 sec;   INR: 1.17          PTT - ( 25 Nov 2022 06:30 )  PTT:68.4 sec    11-25    140  |  104  |  17  ----------------------------<  136<H>  3.6   |  26  |  1.13    Ca    9.1      25 Nov 2022 06:30  Phos  4.1     11-24  Mg     2.1     11-25    TPro  6.8  /  Alb  4.2  /  TBili  0.8  /  DBili  x   /  AST  36  /  ALT  53<H>  /  AlkPhos  78  11-23      Urinalysis Basic - ( 23 Nov 2022 21:17 )    Color: Yellow / Appearance: Clear / SG: >=1.030 / pH: x  Gluc: x / Ketone: NEGATIVE  / Bili: Negative / Urobili: 0.2 E.U./dL   Blood: x / Protein: NEGATIVE mg/dL / Nitrite: NEGATIVE   Leuk Esterase: NEGATIVE / RBC: x / WBC x   Sq Epi: x / Non Sq Epi: x / Bacteria: x        MEDICATIONS  (STANDING):  aMIOdarone    Tablet   Oral   aMIOdarone    Tablet 400 milliGRAM(s) Oral every 8 hours  aspirin  chewable 81 milliGRAM(s) Oral daily  atorvastatin 80 milliGRAM(s) Oral at bedtime  dextrose 5%. 1000 milliLiter(s) (50 mL/Hr) IV Continuous <Continuous>  dextrose 5%. 1000 milliLiter(s) (100 mL/Hr) IV Continuous <Continuous>  dextrose 50% Injectable 25 Gram(s) IV Push once  dextrose 50% Injectable 12.5 Gram(s) IV Push once  dextrose 50% Injectable 25 Gram(s) IV Push once  furosemide   Injectable 20 milliGRAM(s) IV Push every 12 hours  glucagon  Injectable 1 milliGRAM(s) IntraMuscular once  heparin  Infusion 1200 Unit(s)/Hr (13.5 mL/Hr) IV Continuous <Continuous>  influenza   Vaccine 0.5 milliLiter(s) IntraMuscular once  insulin lispro (ADMELOG) corrective regimen sliding scale   SubCutaneous Before meals and at bedtime  magnesium oxide 400 milliGRAM(s) Oral daily  metoprolol tartrate 25 milliGRAM(s) Oral every 12 hours  pantoprazole    Tablet 40 milliGRAM(s) Oral before breakfast  senna 2 Tablet(s) Oral at bedtime  sodium chloride 0.9% lock flush 3 milliLiter(s) IV Push every 8 hours    MEDICATIONS  (PRN):  ALPRAZolam 0.5 milliGRAM(s) Oral at bedtime PRN anxiety/sleep  dextrose Oral Gel 15 Gram(s) Oral once PRN Blood Glucose LESS THAN 70 milliGRAM(s)/deciliter        RADIOLOGY & ADDITIONAL TESTS:

## 2022-11-25 NOTE — PROGRESS NOTE ADULT - ASSESSMENT
63 y/o male PMH anxiety, morbid obesity, "fast heart rate" (not on blood thinners) who c/o SOB, N&V, night sweats and  palpitations for the last few days which progressively worsened. Patient was brought by EMS to [facility] and found to be in rapid atrial fibrillation -200, HTN with expiratory wheezing. Given Cardizem and albuterol with improvement. Placed on heparin drip at that time and given lasix after CXR showed pulmonary edema. Troponin found to be elevated, ruled in for NSTEMI. Cardiac cath completed and showed 2 vessel CAD mLAD 60% mCX 50% with EF 40% and medical management recommended. OLEG at OSH showed severe central MR. Patient was transferred to [facility] under the care of Dr. Reilly for surgical evaluation of his MR. Heparin gtt started, IV lasix started, amio loaded. Plan for TTE tomorrow. Likely OR on Monday for MV repair/replacement.    Plan:    Neurovascular:   -no current pain  anxiety  -continue xanax    Cardiovascular:   severe MR  -repeat TTE tomorrow  -continue IV lasix  afib  -continue heparin gtt  -continue PO amio load  CAD  -nonobstructive CAD  -continue ASA  -continue metoprolol  HLD  -continue atorvastatin  -Hemodynamically stable.   -Monitor: BP, HR, tele    Respiratory:   -Oxygenating well on room air  -Encourage continued use of IS 10x/hr and frequent ambulation    GI:  -GI PPX: continue protonix  -PO Diet  -Bowel Regimen: continue senna    Renal / :  -Continue to monitor renal function: BUN/Cr: 17/1.13  -Monitor I/O's daily     Endocrine:    DM  -A1c: 6.1  -start MISS  no hx thyroid disease  -TSH: 3.090    Hematologic:  -CBC: H/H- 14.8/43.9  -Coagulation Panel.    ID:  -Temperature: afebrile  -CBC: WBC- 8.77  -Continue to observe for SIRS/Sepsis Syndrome.    Prophylaxis:  -DVT prophylaxis with heparin gtt  -Continue with SCD's b/l while patient is at rest     Disposition:  -OR evaluation underway

## 2022-11-26 LAB
ANION GAP SERPL CALC-SCNC: 10 MMOL/L — SIGNIFICANT CHANGE UP (ref 5–17)
ANION GAP SERPL CALC-SCNC: 13 MMOL/L — SIGNIFICANT CHANGE UP (ref 5–17)
APTT BLD: 52.7 SEC — HIGH (ref 27.5–35.5)
APTT BLD: 83.3 SEC — HIGH (ref 27.5–35.5)
BUN SERPL-MCNC: 21 MG/DL — SIGNIFICANT CHANGE UP (ref 7–23)
BUN SERPL-MCNC: 23 MG/DL — SIGNIFICANT CHANGE UP (ref 7–23)
CALCIUM SERPL-MCNC: 9.6 MG/DL — SIGNIFICANT CHANGE UP (ref 8.4–10.5)
CALCIUM SERPL-MCNC: 9.7 MG/DL — SIGNIFICANT CHANGE UP (ref 8.4–10.5)
CHLORIDE SERPL-SCNC: 100 MMOL/L — SIGNIFICANT CHANGE UP (ref 96–108)
CHLORIDE SERPL-SCNC: 103 MMOL/L — SIGNIFICANT CHANGE UP (ref 96–108)
CO2 SERPL-SCNC: 26 MMOL/L — SIGNIFICANT CHANGE UP (ref 22–31)
CO2 SERPL-SCNC: 29 MMOL/L — SIGNIFICANT CHANGE UP (ref 22–31)
CREAT SERPL-MCNC: 1.28 MG/DL — SIGNIFICANT CHANGE UP (ref 0.5–1.3)
CREAT SERPL-MCNC: 1.36 MG/DL — HIGH (ref 0.5–1.3)
EGFR: 58 ML/MIN/1.73M2 — LOW
EGFR: 62 ML/MIN/1.73M2 — SIGNIFICANT CHANGE UP
GLUCOSE BLDC GLUCOMTR-MCNC: 138 MG/DL — HIGH (ref 70–99)
GLUCOSE BLDC GLUCOMTR-MCNC: 139 MG/DL — HIGH (ref 70–99)
GLUCOSE BLDC GLUCOMTR-MCNC: 140 MG/DL — HIGH (ref 70–99)
GLUCOSE BLDC GLUCOMTR-MCNC: 147 MG/DL — HIGH (ref 70–99)
GLUCOSE SERPL-MCNC: 126 MG/DL — HIGH (ref 70–99)
GLUCOSE SERPL-MCNC: 95 MG/DL — SIGNIFICANT CHANGE UP (ref 70–99)
HCT VFR BLD CALC: 43.1 % — SIGNIFICANT CHANGE UP (ref 39–50)
HCT VFR BLD CALC: 44.7 % — SIGNIFICANT CHANGE UP (ref 39–50)
HGB BLD-MCNC: 15 G/DL — SIGNIFICANT CHANGE UP (ref 13–17)
HGB BLD-MCNC: 15.2 G/DL — SIGNIFICANT CHANGE UP (ref 13–17)
INR BLD: 1.18 — HIGH (ref 0.88–1.16)
INR BLD: 1.22 — HIGH (ref 0.88–1.16)
MAGNESIUM SERPL-MCNC: 2.2 MG/DL — SIGNIFICANT CHANGE UP (ref 1.6–2.6)
MAGNESIUM SERPL-MCNC: 2.2 MG/DL — SIGNIFICANT CHANGE UP (ref 1.6–2.6)
MCHC RBC-ENTMCNC: 33 PG — SIGNIFICANT CHANGE UP (ref 27–34)
MCHC RBC-ENTMCNC: 33.3 PG — SIGNIFICANT CHANGE UP (ref 27–34)
MCHC RBC-ENTMCNC: 34 GM/DL — SIGNIFICANT CHANGE UP (ref 32–36)
MCHC RBC-ENTMCNC: 34.8 GM/DL — SIGNIFICANT CHANGE UP (ref 32–36)
MCV RBC AUTO: 95.8 FL — SIGNIFICANT CHANGE UP (ref 80–100)
MCV RBC AUTO: 97.2 FL — SIGNIFICANT CHANGE UP (ref 80–100)
NRBC # BLD: 0 /100 WBCS — SIGNIFICANT CHANGE UP (ref 0–0)
NRBC # BLD: 0 /100 WBCS — SIGNIFICANT CHANGE UP (ref 0–0)
PLATELET # BLD AUTO: 188 K/UL — SIGNIFICANT CHANGE UP (ref 150–400)
PLATELET # BLD AUTO: 196 K/UL — SIGNIFICANT CHANGE UP (ref 150–400)
POTASSIUM SERPL-MCNC: 4 MMOL/L — SIGNIFICANT CHANGE UP (ref 3.5–5.3)
POTASSIUM SERPL-MCNC: 4.2 MMOL/L — SIGNIFICANT CHANGE UP (ref 3.5–5.3)
POTASSIUM SERPL-SCNC: 4 MMOL/L — SIGNIFICANT CHANGE UP (ref 3.5–5.3)
POTASSIUM SERPL-SCNC: 4.2 MMOL/L — SIGNIFICANT CHANGE UP (ref 3.5–5.3)
PROTHROM AB SERPL-ACNC: 14.1 SEC — HIGH (ref 10.5–13.4)
PROTHROM AB SERPL-ACNC: 14.5 SEC — HIGH (ref 10.5–13.4)
RBC # BLD: 4.5 M/UL — SIGNIFICANT CHANGE UP (ref 4.2–5.8)
RBC # BLD: 4.6 M/UL — SIGNIFICANT CHANGE UP (ref 4.2–5.8)
RBC # FLD: 14.6 % — HIGH (ref 10.3–14.5)
RBC # FLD: 14.6 % — HIGH (ref 10.3–14.5)
SODIUM SERPL-SCNC: 139 MMOL/L — SIGNIFICANT CHANGE UP (ref 135–145)
SODIUM SERPL-SCNC: 142 MMOL/L — SIGNIFICANT CHANGE UP (ref 135–145)
WBC # BLD: 8.73 K/UL — SIGNIFICANT CHANGE UP (ref 3.8–10.5)
WBC # BLD: 9.08 K/UL — SIGNIFICANT CHANGE UP (ref 3.8–10.5)
WBC # FLD AUTO: 8.73 K/UL — SIGNIFICANT CHANGE UP (ref 3.8–10.5)
WBC # FLD AUTO: 9.08 K/UL — SIGNIFICANT CHANGE UP (ref 3.8–10.5)

## 2022-11-26 PROCEDURE — 99232 SBSQ HOSP IP/OBS MODERATE 35: CPT

## 2022-11-26 RX ORDER — METOPROLOL TARTRATE 50 MG
37.5 TABLET ORAL
Refills: 0 | Status: DISCONTINUED | OUTPATIENT
Start: 2022-11-26 | End: 2022-11-28

## 2022-11-26 RX ORDER — HYDROXYZINE HCL 10 MG
25 TABLET ORAL ONCE
Refills: 0 | Status: COMPLETED | OUTPATIENT
Start: 2022-11-26 | End: 2022-11-26

## 2022-11-26 RX ORDER — ALPRAZOLAM 0.25 MG
0.5 TABLET ORAL ONCE
Refills: 0 | Status: DISCONTINUED | OUTPATIENT
Start: 2022-11-26 | End: 2022-11-26

## 2022-11-26 RX ADMIN — Medication 37.5 MILLIGRAM(S): at 06:11

## 2022-11-26 RX ADMIN — SODIUM CHLORIDE 3 MILLILITER(S): 9 INJECTION INTRAMUSCULAR; INTRAVENOUS; SUBCUTANEOUS at 06:19

## 2022-11-26 RX ADMIN — Medication 25 MILLIGRAM(S): at 03:10

## 2022-11-26 RX ADMIN — AMIODARONE HYDROCHLORIDE 400 MILLIGRAM(S): 400 TABLET ORAL at 13:39

## 2022-11-26 RX ADMIN — MAGNESIUM OXIDE 400 MG ORAL TABLET 400 MILLIGRAM(S): 241.3 TABLET ORAL at 11:32

## 2022-11-26 RX ADMIN — Medication 0.5 MILLIGRAM(S): at 03:09

## 2022-11-26 RX ADMIN — ATORVASTATIN CALCIUM 80 MILLIGRAM(S): 80 TABLET, FILM COATED ORAL at 21:58

## 2022-11-26 RX ADMIN — AMIODARONE HYDROCHLORIDE 400 MILLIGRAM(S): 400 TABLET ORAL at 06:12

## 2022-11-26 RX ADMIN — Medication 25 MILLIGRAM(S): at 22:07

## 2022-11-26 RX ADMIN — Medication 37.5 MILLIGRAM(S): at 00:16

## 2022-11-26 RX ADMIN — HEPARIN SODIUM 13.5 UNIT(S)/HR: 5000 INJECTION INTRAVENOUS; SUBCUTANEOUS at 21:58

## 2022-11-26 RX ADMIN — SENNA PLUS 2 TABLET(S): 8.6 TABLET ORAL at 21:58

## 2022-11-26 RX ADMIN — PANTOPRAZOLE SODIUM 40 MILLIGRAM(S): 20 TABLET, DELAYED RELEASE ORAL at 06:11

## 2022-11-26 RX ADMIN — Medication 37.5 MILLIGRAM(S): at 17:34

## 2022-11-26 RX ADMIN — SODIUM CHLORIDE 3 MILLILITER(S): 9 INJECTION INTRAMUSCULAR; INTRAVENOUS; SUBCUTANEOUS at 22:00

## 2022-11-26 RX ADMIN — Medication 81 MILLIGRAM(S): at 11:33

## 2022-11-26 RX ADMIN — AMIODARONE HYDROCHLORIDE 400 MILLIGRAM(S): 400 TABLET ORAL at 21:58

## 2022-11-26 RX ADMIN — SODIUM CHLORIDE 3 MILLILITER(S): 9 INJECTION INTRAMUSCULAR; INTRAVENOUS; SUBCUTANEOUS at 13:32

## 2022-11-26 RX ADMIN — HEPARIN SODIUM 13.5 UNIT(S)/HR: 5000 INJECTION INTRAVENOUS; SUBCUTANEOUS at 02:46

## 2022-11-26 RX ADMIN — Medication 20 MILLIGRAM(S): at 06:12

## 2022-11-26 NOTE — PROGRESS NOTE ADULT - SUBJECTIVE AND OBJECTIVE BOX
Patient discussed on morning rounds with Dr. Rosales and Dr. Farrell    Operation / Date: preop MVR for Monday    SUBJECTIVE ASSESSMENT:  62y Male seen and examined. No complaints, awaiting surgery Monday. Tolerating PO diet, satting well on room air, ambulating independently. Denies lightheadedness, headache, CP, SOB, abdominal pain, nausea, vomiting, fever, chills.      Vital Signs Last 24 Hrs  T(C): 36.5 (26 Nov 2022 09:15), Max: 36.5 (26 Nov 2022 09:15)  T(F): 97.7 (26 Nov 2022 09:15), Max: 97.7 (26 Nov 2022 09:15)  HR: 84 (26 Nov 2022 08:55) (78 - 100)  BP: 112/78 (26 Nov 2022 08:55) (112/78 - 171/84)  BP(mean): 88 (26 Nov 2022 08:55) (88 - 121)  RR: 18 (26 Nov 2022 08:55) (18 - 19)  SpO2: 96% (26 Nov 2022 08:55) (91% - 98%)    Parameters below as of 26 Nov 2022 08:55  Patient On (Oxygen Delivery Method): room air      I&O's Detail    25 Nov 2022 07:01  -  26 Nov 2022 07:00  --------------------------------------------------------  IN:    Heparin: 135 mL  Total IN: 135 mL    OUT:    Voided (mL): 1300 mL  Total OUT: 1300 mL    Total NET: -1165 mL      CHEST TUBE: no  JUSTUS DRAIN:  No.  EPICARDIAL WIRES: No.  TIE DOWNS: No.  HUERTA: No.    PHYSICAL EXAM:    General: NAD, sitting comfortably in chair, conversing appropriately  Neurological: alert and oriented, UE and LE strength equal b/l, facial symmetry present  Cardiovascular: RRR, Clear S1 and S2, +murmur  Respiratory: chest expansion symmetrical, CTA b/l, no wheezing noted  Gastrointestinal: +BS, soft, NT, ND  Extremities: moving spontaneously, no calf tenderness or edema.  Vascular: warm, well perfused.   Incisions: none      LABS:                        15.2   8.73  )-----------( 188      ( 26 Nov 2022 06:03 )             44.7       COUMADIN:  no    PT/INR - ( 26 Nov 2022 06:03 )   PT: 14.5 sec;   INR: 1.22          PTT - ( 26 Nov 2022 06:03 )  PTT:83.3 sec    11-26    142  |  103  |  21  ----------------------------<  126<H>  4.2   |  29  |  1.36<H>    Ca    9.6      26 Nov 2022 06:03  Mg     2.2     11-26            MEDICATIONS  (STANDING):  aMIOdarone    Tablet 400 milliGRAM(s) Oral every 8 hours  aMIOdarone    Tablet   Oral   aspirin  chewable 81 milliGRAM(s) Oral daily  atorvastatin 80 milliGRAM(s) Oral at bedtime  dextrose 5%. 1000 milliLiter(s) (50 mL/Hr) IV Continuous <Continuous>  dextrose 5%. 1000 milliLiter(s) (100 mL/Hr) IV Continuous <Continuous>  dextrose 50% Injectable 25 Gram(s) IV Push once  dextrose 50% Injectable 12.5 Gram(s) IV Push once  dextrose 50% Injectable 25 Gram(s) IV Push once  glucagon  Injectable 1 milliGRAM(s) IntraMuscular once  heparin  Infusion 1200 Unit(s)/Hr (13.5 mL/Hr) IV Continuous <Continuous>  influenza   Vaccine 0.5 milliLiter(s) IntraMuscular once  insulin lispro (ADMELOG) corrective regimen sliding scale   SubCutaneous Before meals and at bedtime  magnesium oxide 400 milliGRAM(s) Oral daily  metoprolol tartrate 37.5 milliGRAM(s) Oral two times a day  pantoprazole    Tablet 40 milliGRAM(s) Oral before breakfast  senna 2 Tablet(s) Oral at bedtime  sodium chloride 0.9% lock flush 3 milliLiter(s) IV Push every 8 hours    MEDICATIONS  (PRN):  ALPRAZolam 0.5 milliGRAM(s) Oral at bedtime PRN anxiety/sleep  dextrose Oral Gel 15 Gram(s) Oral once PRN Blood Glucose LESS THAN 70 milliGRAM(s)/deciliter        RADIOLOGY & ADDITIONAL TESTS:  < from: TTE Echo Complete w/ Contrast w/ Doppler (11.25.22 @ 09:12) >  CONCLUSIONS:     1. Patient was tachycardic during the study.   2. The left atrium is moderately dilated. Left atrial volume index   (CRUZ) is 46.7 ml/m².   3. Left ventricular hypertrophy present. Left ventricular systolic   function is severely reduced with a calculated ejection fraction of 30%   with global hypokinesis.   4. Normal right ventricular size and systolic function.   5. No significant valvular disease.   6. Pulmonary artery systolic pressure is 29 mmHg.   7. No pericardial effusion.    < end of copied text >

## 2022-11-26 NOTE — PROGRESS NOTE ADULT - ASSESSMENT
61 y/o male PMH anxiety, morbid obesity, "fast heart rate" (not on blood thinners) who c/o SOB, N&V, night sweats and  palpitations for the last few days which progressively worsened. Patient was brought by EMS to [facility] and found to be in rapid atrial fibrillation -200, HTN with expiratory wheezing. Given Cardizem and albuterol with improvement. Placed on heparin drip at that time and given lasix after CXR showed pulmonary edema. Troponin found to be elevated, ruled in for NSTEMI. Cardiac cath completed and showed 2 vessel CAD mLAD 60% mCX 50% with EF 40% and medical management recommended. OLEG at OSH showed severe central MR. Patient was transferred to [facility] under the care of Dr. Reilly for surgical evaluation of his MR. Heparin gtt started, IV lasix started, amio loaded. TTE with EF 30%. Creatinine increased, IV lasix held. OR on Monday for sternotomy, CABG, MV repair/replacement, CHANDRIKA, cryomaze with Dr. Reilly.    Plan:    Neurovascular:   -no current pain  anxiety  -continue xanax    Cardiovascular:   severe MR  -OR Monday for CABG, MVR, CHANDRIKA, cryomaze  -stopped IV lasix  afib  -continue heparin gtt - 3x therapeutic  -continue PO amio load  CAD  -CABG Monday  -continue ASA  -continue metoprolol  HLD  -continue atorvastatin  -Hemodynamically stable.   -Monitor: BP, HR, tele    Respiratory:   -Oxygenating well on room air  -Encourage continued use of IS 10x/hr and frequent ambulation    GI:  -GI PPX: continue protonix  -PO Diet  -Bowel Regimen: continue senna    Renal / :  -Continue to monitor renal function: BUN/Cr: 21/1.36, increased from 17/1.13  -IV lasix stopped  -Monitor I/O's daily     Endocrine:    DM  -A1c: 6.1  -start MISS  no hx thyroid disease  -TSH: 3.090    Hematologic:  -CBC: H/H- 15.2/44.7  -Coagulation Panel.    ID:  -Temperature: afebrile  -CBC: WBC- 8.73  -Continue to observe for SIRS/Sepsis Syndrome.    Prophylaxis:  -DVT prophylaxis with heparin gtt  -Continue with SCD's b/l while patient is at rest     Disposition:  -OR evaluation underway     61 y/o male PMH anxiety, morbid obesity, "fast heart rate" (not on blood thinners) who c/o SOB, N&V, night sweats and  palpitations for the last few days which progressively worsened. Patient was brought by EMS to [facility] and found to be in rapid atrial fibrillation -200, HTN with expiratory wheezing. Given Cardizem and albuterol with improvement. Placed on heparin drip at that time and given lasix after CXR showed pulmonary edema. Troponin found to be elevated, ruled in for NSTEMI. Cardiac cath completed and showed 2 vessel CAD mLAD 60% mCX 50% with EF 40% and medical management recommended. OLEG at OSH showed severe central MR. Patient was transferred to [facility] under the care of Dr. Reilly for surgical evaluation of his MR. Heparin gtt started, IV lasix started, amio loaded. TTE with EF 30%. Creatinine increased, IV lasix held. OR on Monday for sternotomy, CABG, MV repair/replacement, CHANDRIKA, cryomaze with Dr. Reilly.    Plan:    Neurovascular:   -no current pain  anxiety  -continue xanax    Cardiovascular:   severe MR  -OR Monday for CABG, MVR, CHANDRIKA, cryomaze  -stopped IV lasix  afib  -continue heparin gtt - 3x therapeutic  -continue PO amio load  CAD  -CABG Monday  -continue ASA  -continue metoprolol  HLD  -continue atorvastatin  -Hemodynamically stable.   -Monitor: BP, HR, tele    Respiratory:   -Oxygenating well on room air  -Encourage continued use of IS 10x/hr and frequent ambulation    GI:  -GI PPX: continue protonix  -PO Diet  -Bowel Regimen: continue senna    Renal / :  -Continue to monitor renal function: BUN/Cr: 21/1.36, increased from 17/1.13  -IV lasix stopped  -repeat labs at 4pm  -Monitor I/O's daily     Endocrine:    DM  -A1c: 6.1  -start MISS  no hx thyroid disease  -TSH: 3.090    Hematologic:  -CBC: H/H- 15.2/44.7  -Coagulation Panel.    ID:  -Temperature: afebrile  -CBC: WBC- 8.73  -Continue to observe for SIRS/Sepsis Syndrome.    Prophylaxis:  -DVT prophylaxis with heparin gtt  -Continue with SCD's b/l while patient is at rest     Disposition:  -OR evaluation underway

## 2022-11-27 ENCOUNTER — TRANSCRIPTION ENCOUNTER (OUTPATIENT)
Age: 62
End: 2022-11-27

## 2022-11-27 LAB
ANION GAP SERPL CALC-SCNC: 11 MMOL/L — SIGNIFICANT CHANGE UP (ref 5–17)
APTT BLD: 106.5 SEC — HIGH (ref 27.5–35.5)
APTT BLD: 61.2 SEC — HIGH (ref 27.5–35.5)
APTT BLD: 65.6 SEC — HIGH (ref 27.5–35.5)
APTT BLD: 67.4 SEC — HIGH (ref 27.5–35.5)
BLD GP AB SCN SERPL QL: NEGATIVE — SIGNIFICANT CHANGE UP
BUN SERPL-MCNC: 22 MG/DL — SIGNIFICANT CHANGE UP (ref 7–23)
CALCIUM SERPL-MCNC: 9.4 MG/DL — SIGNIFICANT CHANGE UP (ref 8.4–10.5)
CHLORIDE SERPL-SCNC: 102 MMOL/L — SIGNIFICANT CHANGE UP (ref 96–108)
CO2 SERPL-SCNC: 26 MMOL/L — SIGNIFICANT CHANGE UP (ref 22–31)
CREAT SERPL-MCNC: 1.33 MG/DL — HIGH (ref 0.5–1.3)
EGFR: 60 ML/MIN/1.73M2 — SIGNIFICANT CHANGE UP
GLUCOSE BLDC GLUCOMTR-MCNC: 107 MG/DL — HIGH (ref 70–99)
GLUCOSE BLDC GLUCOMTR-MCNC: 114 MG/DL — HIGH (ref 70–99)
GLUCOSE BLDC GLUCOMTR-MCNC: 124 MG/DL — HIGH (ref 70–99)
GLUCOSE BLDC GLUCOMTR-MCNC: 145 MG/DL — HIGH (ref 70–99)
GLUCOSE SERPL-MCNC: 122 MG/DL — HIGH (ref 70–99)
HCT VFR BLD CALC: 45.7 % — SIGNIFICANT CHANGE UP (ref 39–50)
HGB BLD-MCNC: 15.3 G/DL — SIGNIFICANT CHANGE UP (ref 13–17)
INR BLD: 1.14 — SIGNIFICANT CHANGE UP (ref 0.88–1.16)
MAGNESIUM SERPL-MCNC: 2.3 MG/DL — SIGNIFICANT CHANGE UP (ref 1.6–2.6)
MCHC RBC-ENTMCNC: 32.8 PG — SIGNIFICANT CHANGE UP (ref 27–34)
MCHC RBC-ENTMCNC: 33.5 GM/DL — SIGNIFICANT CHANGE UP (ref 32–36)
MCV RBC AUTO: 98.1 FL — SIGNIFICANT CHANGE UP (ref 80–100)
NRBC # BLD: 0 /100 WBCS — SIGNIFICANT CHANGE UP (ref 0–0)
PLATELET # BLD AUTO: 176 K/UL — SIGNIFICANT CHANGE UP (ref 150–400)
POTASSIUM SERPL-MCNC: 4 MMOL/L — SIGNIFICANT CHANGE UP (ref 3.5–5.3)
POTASSIUM SERPL-SCNC: 4 MMOL/L — SIGNIFICANT CHANGE UP (ref 3.5–5.3)
PROTHROM AB SERPL-ACNC: 13.6 SEC — HIGH (ref 10.5–13.4)
RBC # BLD: 4.66 M/UL — SIGNIFICANT CHANGE UP (ref 4.2–5.8)
RBC # FLD: 14.4 % — SIGNIFICANT CHANGE UP (ref 10.3–14.5)
RH IG SCN BLD-IMP: POSITIVE — SIGNIFICANT CHANGE UP
SARS-COV-2 RNA SPEC QL NAA+PROBE: SIGNIFICANT CHANGE UP
SODIUM SERPL-SCNC: 139 MMOL/L — SIGNIFICANT CHANGE UP (ref 135–145)
WBC # BLD: 8.84 K/UL — SIGNIFICANT CHANGE UP (ref 3.8–10.5)
WBC # FLD AUTO: 8.84 K/UL — SIGNIFICANT CHANGE UP (ref 3.8–10.5)

## 2022-11-27 RX ORDER — CHLORHEXIDINE GLUCONATE 213 G/1000ML
1 SOLUTION TOPICAL ONCE
Refills: 0 | Status: COMPLETED | OUTPATIENT
Start: 2022-11-27 | End: 2022-11-27

## 2022-11-27 RX ORDER — CHLORHEXIDINE GLUCONATE 213 G/1000ML
1 SOLUTION TOPICAL ONCE
Refills: 0 | Status: COMPLETED | OUTPATIENT
Start: 2022-11-28 | End: 2022-11-28

## 2022-11-27 RX ORDER — CHLORHEXIDINE GLUCONATE 213 G/1000ML
15 SOLUTION TOPICAL ONCE
Refills: 0 | Status: COMPLETED | OUTPATIENT
Start: 2022-11-27 | End: 2022-11-28

## 2022-11-27 RX ADMIN — MAGNESIUM OXIDE 400 MG ORAL TABLET 400 MILLIGRAM(S): 241.3 TABLET ORAL at 12:52

## 2022-11-27 RX ADMIN — Medication 37.5 MILLIGRAM(S): at 18:17

## 2022-11-27 RX ADMIN — AMIODARONE HYDROCHLORIDE 400 MILLIGRAM(S): 400 TABLET ORAL at 05:35

## 2022-11-27 RX ADMIN — Medication 81 MILLIGRAM(S): at 12:51

## 2022-11-27 RX ADMIN — SODIUM CHLORIDE 3 MILLILITER(S): 9 INJECTION INTRAMUSCULAR; INTRAVENOUS; SUBCUTANEOUS at 14:51

## 2022-11-27 RX ADMIN — SODIUM CHLORIDE 3 MILLILITER(S): 9 INJECTION INTRAMUSCULAR; INTRAVENOUS; SUBCUTANEOUS at 05:37

## 2022-11-27 RX ADMIN — HEPARIN SODIUM 12.5 UNIT(S)/HR: 5000 INJECTION INTRAVENOUS; SUBCUTANEOUS at 21:39

## 2022-11-27 RX ADMIN — AMIODARONE HYDROCHLORIDE 400 MILLIGRAM(S): 400 TABLET ORAL at 14:41

## 2022-11-27 RX ADMIN — Medication 0.5 MILLIGRAM(S): at 21:39

## 2022-11-27 RX ADMIN — SENNA PLUS 2 TABLET(S): 8.6 TABLET ORAL at 21:40

## 2022-11-27 RX ADMIN — Medication 37.5 MILLIGRAM(S): at 05:35

## 2022-11-27 RX ADMIN — AMIODARONE HYDROCHLORIDE 400 MILLIGRAM(S): 400 TABLET ORAL at 21:40

## 2022-11-27 RX ADMIN — PANTOPRAZOLE SODIUM 40 MILLIGRAM(S): 20 TABLET, DELAYED RELEASE ORAL at 05:35

## 2022-11-27 RX ADMIN — CHLORHEXIDINE GLUCONATE 1 APPLICATION(S): 213 SOLUTION TOPICAL at 21:40

## 2022-11-27 RX ADMIN — ATORVASTATIN CALCIUM 80 MILLIGRAM(S): 80 TABLET, FILM COATED ORAL at 21:40

## 2022-11-27 RX ADMIN — SODIUM CHLORIDE 3 MILLILITER(S): 9 INJECTION INTRAMUSCULAR; INTRAVENOUS; SUBCUTANEOUS at 21:32

## 2022-11-27 RX ADMIN — HEPARIN SODIUM 12.5 UNIT(S)/HR: 5000 INJECTION INTRAVENOUS; SUBCUTANEOUS at 08:15

## 2022-11-27 NOTE — PRE-OP CHECKLIST - HIBICLENS SHOWER 1 DATE
Updated Gauri GOMEZ APNP on LLE still with no dopplered pedal pulse and foot is very cool/cold from mid-foot to toes. Per NP, Vascular Medicine paged. Spoke with ADDI Milan and updated her on the above. She will come to bedside to assess Pt.   27-Nov-2022 20:00

## 2022-11-27 NOTE — PROGRESS NOTE ADULT - ASSESSMENT
61 y/o male PMH anxiety, morbid obesity, "fast heart rate" (not on blood thinners) who c/o SOB, N&V, night sweats and  palpitations for the last few days which progressively worsened. Patient was brought by EMS to [facility] and found to be in rapid atrial fibrillation -200, HTN with expiratory wheezing. Given Cardizem and albuterol with improvement. Placed on heparin drip at that time and given lasix after CXR showed pulmonary edema. Troponin found to be elevated, ruled in for NSTEMI. Cardiac cath completed and showed 2 vessel CAD mLAD 60% mCX 50% with EF 40% and medical management recommended. OLEG at OSH showed severe central MR. Patient was transferred to [facility] under the care of Dr. Reilly for surgical evaluation of his MR. Heparin gtt started, IV lasix started, amio loaded. TTE with EF 30%. Creatinine increased, IV lasix held. OR tomorrow for sternotomy, CABG, MV repair/replacement, CHANDRIKA, cryomaze with Dr. Reilly.    Plan:    Neurovascular:   -no current pain  anxiety  -continue xanax    Cardiovascular:   severe MR  -OR Monday for CABG, MVR, CHANDRIKA, cryomaze  -stopped IV lasix 2/2 creatinine increase  afib  -continue heparin gtt   -PTT at 12pm  -continue PO amio load  CAD  -CABG Monday  -continue ASA  -continue metoprolol  HLD  -continue atorvastatin  -Hemodynamically stable.   -Monitor: BP, HR, tele    Respiratory:   -Oxygenating well on room air  -Encourage continued use of IS 10x/hr and frequent ambulation    GI:  -GI PPX: continue protonix  -PO Diet  -Bowel Regimen: continue senna    Renal / :  -Continue to monitor renal function: BUN/Cr: 22/1.33  -IV lasix stopped  -Monitor I/O's daily     Endocrine:    DM  -A1c: 6.1  -start MISS  no hx thyroid disease  -TSH: 3.090    Hematologic:  -CBC: H/H- 15.3/45.7  -Coagulation Panel.    ID:  -Temperature: afebrile  -CBC: WBC- 8.84  -Continue to observe for SIRS/Sepsis Syndrome.    Prophylaxis:  -DVT prophylaxis with heparin gtt  -Continue with SCD's b/l while patient is at rest     Disposition:  -OR tomorrow

## 2022-11-27 NOTE — PROGRESS NOTE ADULT - SUBJECTIVE AND OBJECTIVE BOX
Planned Date of Surgery:       11/28/22                                                                                                           Surgeon: Dr. Reilly    Procedure: MVR, CABG, CHANDRIKA, cryomaze    HPI:  63 y/o male PMH anxiety, morbid obesity, "fast heart rate" (not on blood thinners) who c/o SOB, N&V, night sweats and  palpitations for the last few days which progressively worsened. Patient was brought by EMS to [facility] and found to be in rapid atrial fibrillation -200, HTN with expiratory wheezing. Given Cardizem and albuterol with improvement. Placed on heparin drip at that time and given lasix after CXR showed pulmonary edema. Troponin found to be elevated, ruled in for NSTEMI. Cardiac cath completed and showed 2 vessel CAD mLAD 60% mCX 50% with EF 40% and medical management recommended. OLEG at OSH showed severe central MR. Patient was transferred to [facility] under the care of Dr. Reilly for surgical evaluation of his MR. Heparin gtt started, IV lasix started, amio loaded. TTE with EF 30%. Creatinine increased, IV lasix held. OR on Monday for sternotomy, CABG, MV repair/replacement, CHANDRIKA, cryomaze with Dr. Reilly. Denies lightheadedness, headache, CP, palpitations, SOB, abdominal pain, nausea, vomiting, fever, chills.    PAST MEDICAL & SURGICAL HISTORY:  Morbid obesity      Rapid palpitations      Anxiety      No significant past surgical history          No Known Allergies      Physical Exam  T(C): 35.9 (11-27-22 @ 08:54), Max: 37.2 (11-27-22 @ 01:01)  HR: 80 (11-27-22 @ 08:35) (72 - 92)  BP: 122/70 (11-27-22 @ 08:35) (104/51 - 130/91)  RR: 18 (11-27-22 @ 08:35) (18 - 18)  SpO2: 96% (11-27-22 @ 08:35) (94% - 96%)    General: NAD, sitting comfortably in chair, conversing appropriately  Neurological: alert and oriented, UE and LE strength equal b/l, facial symmetry present  Cardiovascular: RRR, Clear S1 and S2, no murmurs appreciated  Respiratory: chest expansion symmetrical, CTA b/l, no wheezing noted  Gastrointestinal: +BS, soft, NT, ND  Extremities: moving spontaneously, no calf tenderness or edema.  Vascular: warm, well perfused.   Incisions: none    MEDICATIONS  (STANDING):  aMIOdarone    Tablet   Oral   aMIOdarone    Tablet 400 milliGRAM(s) Oral every 8 hours  aspirin  chewable 81 milliGRAM(s) Oral daily  atorvastatin 80 milliGRAM(s) Oral at bedtime  dextrose 5%. 1000 milliLiter(s) (100 mL/Hr) IV Continuous <Continuous>  dextrose 5%. 1000 milliLiter(s) (50 mL/Hr) IV Continuous <Continuous>  dextrose 50% Injectable 25 Gram(s) IV Push once  dextrose 50% Injectable 12.5 Gram(s) IV Push once  dextrose 50% Injectable 25 Gram(s) IV Push once  glucagon  Injectable 1 milliGRAM(s) IntraMuscular once  heparin  Infusion 1200 Unit(s)/Hr (12.5 mL/Hr) IV Continuous <Continuous>  influenza   Vaccine 0.5 milliLiter(s) IntraMuscular once  insulin lispro (ADMELOG) corrective regimen sliding scale   SubCutaneous Before meals and at bedtime  magnesium oxide 400 milliGRAM(s) Oral daily  metoprolol tartrate 37.5 milliGRAM(s) Oral two times a day  pantoprazole    Tablet 40 milliGRAM(s) Oral before breakfast  senna 2 Tablet(s) Oral at bedtime  sodium chloride 0.9% lock flush 3 milliLiter(s) IV Push every 8 hours    MEDICATIONS  (PRN):  ALPRAZolam 0.5 milliGRAM(s) Oral at bedtime PRN anxiety/sleep  dextrose Oral Gel 15 Gram(s) Oral once PRN Blood Glucose LESS THAN 70 milliGRAM(s)/deciliter      On Beta Blocker? yes    Labs:                        15.3   8.84  )-----------( 176      ( 27 Nov 2022 05:56 )             45.7     11-27    139  |  102  |  22  ----------------------------<  122<H>  4.0   |  26  |  1.33<H>    Ca    9.4      27 Nov 2022 05:56  Mg     2.3     11-27      PT/INR - ( 27 Nov 2022 05:56 )   PT: 13.6 sec;   INR: 1.14          PTT - ( 27 Nov 2022 05:56 )  PTT:106.5 sec    ABO Interpretation: A (11-27-22 @ 06:22)      Hgb A1C:  A1C with Estimated Average Glucose Result: 6.1: Reference Range 4.0-5.6%   EKG:  in chart    CXR:  < from: Xray Chest 2 Views PA/Lat (11.25.22 @ 10:53) >    Findings/  impression: Bilateralpleural effusions. Heart and mediastinum are   unremarkable. Thoracic spine degenerative changes. Levoscoliosis    < end of copied text >      CT Scans:  not indicated    Cath Report:  cath cat OSH, 2vCAD    Echo:   < from: TTE Echo Complete w/ Contrast w/ Doppler (11.25.22 @ 09:12) >  CONCLUSIONS:     1. Patient was tachycardic during the study.   2. The left atrium is moderately dilated. Left atrial volume index   (CRUZ) is 46.7 ml/m².   3. Left ventricular hypertrophy present. Left ventricular systolic   function is severely reduced with a calculated ejection fraction of 30%   with global hypokinesis.   4. Normal right ventricular size and systolic function.   5. No significant valvular disease.   6. Pulmonary artery systolic pressure is 29 mmHg.   7. No pericardial effusion.    < end of copied text >    PFT's:  in chart. moderately severe    Carotid Duplex:  < from: US Duplex Carotid Arteries Complete, Bilateral (11.24.22 @ 15:04) >  Antegrade flow is noted within both vertebral arteries.    IMPRESSION: No significant hemodynamic stenosisof either carotid artery.    Measurement of carotid stenosis is based on velocity parameters that   correlate the residual internal carotid diameter with that of the more   distal vessel in accordance with a method such as the [place] American   Symptomatic Carotid Endarterectomy Trial (NASCET).    < end of copied text >    Consent in Chart? YES   Pre-op Orders Placed? YES   Blood Products Ordered? YES   NPO ordered? YES

## 2022-11-28 ENCOUNTER — APPOINTMENT (OUTPATIENT)
Dept: CARDIOTHORACIC SURGERY | Facility: HOSPITAL | Age: 62
End: 2022-11-28

## 2022-11-28 ENCOUNTER — TRANSCRIPTION ENCOUNTER (OUTPATIENT)
Age: 62
End: 2022-11-28

## 2022-11-28 LAB
ALBUMIN SERPL ELPH-MCNC: 3.2 G/DL — LOW (ref 3.3–5)
ALBUMIN SERPL ELPH-MCNC: 3.4 G/DL — SIGNIFICANT CHANGE UP (ref 3.3–5)
ALP SERPL-CCNC: 60 U/L — SIGNIFICANT CHANGE UP (ref 40–120)
ALP SERPL-CCNC: 63 U/L — SIGNIFICANT CHANGE UP (ref 40–120)
ALT FLD-CCNC: 56 U/L — HIGH (ref 10–45)
ALT FLD-CCNC: 56 U/L — HIGH (ref 10–45)
ANION GAP SERPL CALC-SCNC: 10 MMOL/L — SIGNIFICANT CHANGE UP (ref 5–17)
ANION GAP SERPL CALC-SCNC: 12 MMOL/L — SIGNIFICANT CHANGE UP (ref 5–17)
ANION GAP SERPL CALC-SCNC: 12 MMOL/L — SIGNIFICANT CHANGE UP (ref 5–17)
APTT BLD: 29.2 SEC — SIGNIFICANT CHANGE UP (ref 27.5–35.5)
APTT BLD: 86.5 SEC — HIGH (ref 27.5–35.5)
APTT BLD: 87 SEC — HIGH (ref 27.5–35.5)
AST SERPL-CCNC: 174 U/L — HIGH (ref 10–40)
AST SERPL-CCNC: 230 U/L — HIGH (ref 10–40)
BASE EXCESS BLDA CALC-SCNC: -0.4 MMOL/L — SIGNIFICANT CHANGE UP (ref -2–3)
BASE EXCESS BLDA CALC-SCNC: -1.5 MMOL/L — SIGNIFICANT CHANGE UP (ref -2–3)
BASE EXCESS BLDA CALC-SCNC: -1.7 MMOL/L — SIGNIFICANT CHANGE UP (ref -2–3)
BASE EXCESS BLDA CALC-SCNC: -2.5 MMOL/L — LOW (ref -2–3)
BASE EXCESS BLDA CALC-SCNC: 0 MMOL/L — SIGNIFICANT CHANGE UP (ref -2–3)
BASE EXCESS BLDA CALC-SCNC: 0.4 MMOL/L — SIGNIFICANT CHANGE UP (ref -2–3)
BASE EXCESS BLDA CALC-SCNC: 1.9 MMOL/L — SIGNIFICANT CHANGE UP (ref -2–3)
BASE EXCESS BLDA CALC-SCNC: 2.4 MMOL/L — SIGNIFICANT CHANGE UP (ref -2–3)
BASE EXCESS BLDV CALC-SCNC: -4.3 MMOL/L — LOW (ref -2–3)
BASE EXCESS BLDV CALC-SCNC: 2 MMOL/L — SIGNIFICANT CHANGE UP (ref -2–3)
BASOPHILS # BLD AUTO: 0.05 K/UL — SIGNIFICANT CHANGE UP (ref 0–0.2)
BASOPHILS NFR BLD AUTO: 0.3 % — SIGNIFICANT CHANGE UP (ref 0–2)
BILIRUB SERPL-MCNC: 0.8 MG/DL — SIGNIFICANT CHANGE UP (ref 0.2–1.2)
BILIRUB SERPL-MCNC: 1.2 MG/DL — SIGNIFICANT CHANGE UP (ref 0.2–1.2)
BUN SERPL-MCNC: 17 MG/DL — SIGNIFICANT CHANGE UP (ref 7–23)
BUN SERPL-MCNC: 18 MG/DL — SIGNIFICANT CHANGE UP (ref 7–23)
BUN SERPL-MCNC: 19 MG/DL — SIGNIFICANT CHANGE UP (ref 7–23)
CA-I BLDA-SCNC: 0.93 MMOL/L — LOW (ref 1.15–1.33)
CA-I BLDA-SCNC: 0.99 MMOL/L — LOW (ref 1.15–1.33)
CA-I BLDA-SCNC: 1.06 MMOL/L — LOW (ref 1.15–1.33)
CA-I BLDA-SCNC: 1.12 MMOL/L — LOW (ref 1.15–1.33)
CA-I BLDA-SCNC: 1.14 MMOL/L — LOW (ref 1.15–1.33)
CA-I BLDA-SCNC: 1.17 MMOL/L — SIGNIFICANT CHANGE UP (ref 1.15–1.33)
CA-I BLDA-SCNC: 1.22 MMOL/L — SIGNIFICANT CHANGE UP (ref 1.15–1.33)
CA-I BLDA-SCNC: 1.24 MMOL/L — SIGNIFICANT CHANGE UP (ref 1.15–1.33)
CA-I SERPL-SCNC: 0.96 MMOL/L — LOW (ref 1.15–1.33)
CA-I SERPL-SCNC: 1.46 MMOL/L — HIGH (ref 1.15–1.33)
CALCIUM SERPL-MCNC: 8.8 MG/DL — SIGNIFICANT CHANGE UP (ref 8.4–10.5)
CALCIUM SERPL-MCNC: 8.8 MG/DL — SIGNIFICANT CHANGE UP (ref 8.4–10.5)
CALCIUM SERPL-MCNC: 9.5 MG/DL — SIGNIFICANT CHANGE UP (ref 8.4–10.5)
CHLORIDE SERPL-SCNC: 102 MMOL/L — SIGNIFICANT CHANGE UP (ref 96–108)
CHLORIDE SERPL-SCNC: 103 MMOL/L — SIGNIFICANT CHANGE UP (ref 96–108)
CHLORIDE SERPL-SCNC: 106 MMOL/L — SIGNIFICANT CHANGE UP (ref 96–108)
CO2 BLDA-SCNC: 24 MMOL/L — SIGNIFICANT CHANGE UP (ref 19–24)
CO2 BLDA-SCNC: 25 MMOL/L — HIGH (ref 19–24)
CO2 BLDA-SCNC: 26 MMOL/L — HIGH (ref 19–24)
CO2 BLDA-SCNC: 27 MMOL/L — HIGH (ref 19–24)
CO2 BLDA-SCNC: 28 MMOL/L — HIGH (ref 19–24)
CO2 BLDA-SCNC: 28 MMOL/L — HIGH (ref 19–24)
CO2 BLDV-SCNC: 24.5 MMOL/L — SIGNIFICANT CHANGE UP (ref 22–26)
CO2 BLDV-SCNC: 29.2 MMOL/L — HIGH (ref 22–26)
CO2 SERPL-SCNC: 23 MMOL/L — SIGNIFICANT CHANGE UP (ref 22–31)
CO2 SERPL-SCNC: 24 MMOL/L — SIGNIFICANT CHANGE UP (ref 22–31)
CO2 SERPL-SCNC: 29 MMOL/L — SIGNIFICANT CHANGE UP (ref 22–31)
COHGB MFR BLDA: 1.1 % — SIGNIFICANT CHANGE UP
COHGB MFR BLDA: 1.2 % — SIGNIFICANT CHANGE UP
COHGB MFR BLDA: 1.4 % — SIGNIFICANT CHANGE UP
COHGB MFR BLDA: 1.4 % — SIGNIFICANT CHANGE UP
COHGB MFR BLDA: 1.6 % — SIGNIFICANT CHANGE UP
COHGB MFR BLDA: 1.6 % — SIGNIFICANT CHANGE UP
COHGB MFR BLDA: 1.7 % — SIGNIFICANT CHANGE UP
COHGB MFR BLDA: 1.8 % — SIGNIFICANT CHANGE UP
COHGB MFR BLDV: 1.6 % — SIGNIFICANT CHANGE UP
CREAT SERPL-MCNC: 1.29 MG/DL — SIGNIFICANT CHANGE UP (ref 0.5–1.3)
CREAT SERPL-MCNC: 1.29 MG/DL — SIGNIFICANT CHANGE UP (ref 0.5–1.3)
CREAT SERPL-MCNC: 1.43 MG/DL — HIGH (ref 0.5–1.3)
EGFR: 55 ML/MIN/1.73M2 — LOW
EGFR: 62 ML/MIN/1.73M2 — SIGNIFICANT CHANGE UP
EGFR: 62 ML/MIN/1.73M2 — SIGNIFICANT CHANGE UP
EOSINOPHIL # BLD AUTO: 0.04 K/UL — SIGNIFICANT CHANGE UP (ref 0–0.5)
EOSINOPHIL NFR BLD AUTO: 0.2 % — SIGNIFICANT CHANGE UP (ref 0–6)
GAS PNL BLDA: SIGNIFICANT CHANGE UP
GAS PNL BLDA: SIGNIFICANT CHANGE UP
GAS PNL BLDV: 136 MMOL/L — SIGNIFICANT CHANGE UP (ref 136–145)
GAS PNL BLDV: 145 MMOL/L — SIGNIFICANT CHANGE UP (ref 136–145)
GAS PNL BLDV: SIGNIFICANT CHANGE UP
GLUCOSE BLDA-MCNC: 102 MG/DL — HIGH (ref 70–99)
GLUCOSE BLDA-MCNC: 109 MG/DL — HIGH (ref 70–99)
GLUCOSE BLDA-MCNC: 123 MG/DL — HIGH (ref 70–99)
GLUCOSE BLDA-MCNC: 126 MG/DL — HIGH (ref 70–99)
GLUCOSE BLDA-MCNC: 127 MG/DL — HIGH (ref 70–99)
GLUCOSE BLDA-MCNC: 133 MG/DL — HIGH (ref 70–99)
GLUCOSE BLDA-MCNC: 146 MG/DL — HIGH (ref 70–99)
GLUCOSE BLDA-MCNC: 152 MG/DL — HIGH (ref 70–99)
GLUCOSE BLDC GLUCOMTR-MCNC: 118 MG/DL — HIGH (ref 70–99)
GLUCOSE BLDC GLUCOMTR-MCNC: 159 MG/DL — HIGH (ref 70–99)
GLUCOSE BLDV-MCNC: 109 MG/DL — HIGH (ref 70–99)
GLUCOSE SERPL-MCNC: 123 MG/DL — HIGH (ref 70–99)
GLUCOSE SERPL-MCNC: 155 MG/DL — HIGH (ref 70–99)
GLUCOSE SERPL-MCNC: 185 MG/DL — HIGH (ref 70–99)
HCO3 BLDA-SCNC: 22 MMOL/L — SIGNIFICANT CHANGE UP (ref 21–28)
HCO3 BLDA-SCNC: 24 MMOL/L — SIGNIFICANT CHANGE UP (ref 21–28)
HCO3 BLDA-SCNC: 25 MMOL/L — SIGNIFICANT CHANGE UP (ref 21–28)
HCO3 BLDA-SCNC: 26 MMOL/L — SIGNIFICANT CHANGE UP (ref 21–28)
HCO3 BLDA-SCNC: 26 MMOL/L — SIGNIFICANT CHANGE UP (ref 21–28)
HCO3 BLDA-SCNC: 27 MMOL/L — SIGNIFICANT CHANGE UP (ref 21–28)
HCO3 BLDV-SCNC: 23 MMOL/L — SIGNIFICANT CHANGE UP (ref 22–29)
HCO3 BLDV-SCNC: 28 MMOL/L — SIGNIFICANT CHANGE UP (ref 22–29)
HCT VFR BLD CALC: 39.9 % — SIGNIFICANT CHANGE UP (ref 39–50)
HCT VFR BLD CALC: 46.2 % — SIGNIFICANT CHANGE UP (ref 39–50)
HGB BLD CALC-MCNC: 13.2 G/DL — SIGNIFICANT CHANGE UP (ref 12.6–17.4)
HGB BLD-MCNC: 13.5 G/DL — SIGNIFICANT CHANGE UP (ref 13–17)
HGB BLD-MCNC: 15.4 G/DL — SIGNIFICANT CHANGE UP (ref 13–17)
HGB BLDA-MCNC: 11.6 G/DL — LOW (ref 12.6–17.4)
HGB BLDA-MCNC: 11.9 G/DL — LOW (ref 12.6–17.4)
HGB BLDA-MCNC: 12.4 G/DL — LOW (ref 12.6–17.4)
HGB BLDA-MCNC: 12.8 G/DL — SIGNIFICANT CHANGE UP (ref 12.6–17.4)
HGB BLDA-MCNC: 13.2 G/DL — SIGNIFICANT CHANGE UP (ref 12.6–17.4)
HGB BLDA-MCNC: 13.5 G/DL — SIGNIFICANT CHANGE UP (ref 12.6–17.4)
HGB BLDA-MCNC: 14.9 G/DL — SIGNIFICANT CHANGE UP (ref 12.6–17.4)
HGB BLDA-MCNC: 15.3 G/DL — SIGNIFICANT CHANGE UP (ref 12.6–17.4)
IMM GRANULOCYTES NFR BLD AUTO: 0.7 % — SIGNIFICANT CHANGE UP (ref 0–0.9)
INR BLD: 1.14 — SIGNIFICANT CHANGE UP (ref 0.88–1.16)
INR BLD: 1.37 — HIGH (ref 0.88–1.16)
ISTAT ARTERIAL BE: -1 MMOL/L — SIGNIFICANT CHANGE UP (ref -2–3)
ISTAT ARTERIAL GLUCOSE: 144 MG/DL — HIGH (ref 70–99)
ISTAT ARTERIAL HCO3: 24 MMOL/L — SIGNIFICANT CHANGE UP (ref 22–26)
ISTAT ARTERIAL HEMATOCRIT: 36 % — LOW (ref 39–50)
ISTAT ARTERIAL HEMOGLOBIN: 12.2 G/DL — LOW (ref 13–17)
ISTAT ARTERIAL IONIZED CALCIUM: 1.16 MMOL/L — SIGNIFICANT CHANGE UP (ref 1.12–1.3)
ISTAT ARTERIAL PCO2: 43 MMHG — SIGNIFICANT CHANGE UP (ref 35–45)
ISTAT ARTERIAL PH: 7.36 — SIGNIFICANT CHANGE UP (ref 7.35–7.45)
ISTAT ARTERIAL PO2: 72 MMHG — LOW (ref 80–105)
ISTAT ARTERIAL POTASSIUM: 4.9 MMOL/L — SIGNIFICANT CHANGE UP (ref 3.5–5.3)
ISTAT ARTERIAL SO2: 94 % — LOW (ref 95–98)
ISTAT ARTERIAL SODIUM: 141 MMOL/L — SIGNIFICANT CHANGE UP (ref 135–145)
ISTAT ARTERIAL TCO2: 26 MMOL/L — SIGNIFICANT CHANGE UP (ref 22–31)
LACTATE SERPL-SCNC: 2.1 MMOL/L — HIGH (ref 0.5–2)
LYMPHOCYTES # BLD AUTO: 1.11 K/UL — SIGNIFICANT CHANGE UP (ref 1–3.3)
LYMPHOCYTES # BLD AUTO: 6.9 % — LOW (ref 13–44)
MAGNESIUM SERPL-MCNC: 2.4 MG/DL — SIGNIFICANT CHANGE UP (ref 1.6–2.6)
MAGNESIUM SERPL-MCNC: 2.4 MG/DL — SIGNIFICANT CHANGE UP (ref 1.6–2.6)
MAGNESIUM SERPL-MCNC: 2.6 MG/DL — SIGNIFICANT CHANGE UP (ref 1.6–2.6)
MCHC RBC-ENTMCNC: 32.8 PG — SIGNIFICANT CHANGE UP (ref 27–34)
MCHC RBC-ENTMCNC: 33.3 GM/DL — SIGNIFICANT CHANGE UP (ref 32–36)
MCHC RBC-ENTMCNC: 33.3 PG — SIGNIFICANT CHANGE UP (ref 27–34)
MCHC RBC-ENTMCNC: 33.8 GM/DL — SIGNIFICANT CHANGE UP (ref 32–36)
MCV RBC AUTO: 98.5 FL — SIGNIFICANT CHANGE UP (ref 80–100)
MCV RBC AUTO: 98.5 FL — SIGNIFICANT CHANGE UP (ref 80–100)
METHGB MFR BLDA: 0.5 % — SIGNIFICANT CHANGE UP
METHGB MFR BLDA: 0.7 % — SIGNIFICANT CHANGE UP
METHGB MFR BLDA: 0.7 % — SIGNIFICANT CHANGE UP
METHGB MFR BLDA: 0.8 % — SIGNIFICANT CHANGE UP
METHGB MFR BLDA: 0.8 % — SIGNIFICANT CHANGE UP
METHGB MFR BLDA: 1 % — SIGNIFICANT CHANGE UP
METHGB MFR BLDA: 1 % — SIGNIFICANT CHANGE UP
METHGB MFR BLDA: 1.1 % — SIGNIFICANT CHANGE UP
METHGB MFR BLDV: 1 % — SIGNIFICANT CHANGE UP
MONOCYTES # BLD AUTO: 0.86 K/UL — SIGNIFICANT CHANGE UP (ref 0–0.9)
MONOCYTES NFR BLD AUTO: 5.3 % — SIGNIFICANT CHANGE UP (ref 2–14)
NEUTROPHILS # BLD AUTO: 13.91 K/UL — HIGH (ref 1.8–7.4)
NEUTROPHILS NFR BLD AUTO: 86.6 % — HIGH (ref 43–77)
NRBC # BLD: 0 /100 WBCS — SIGNIFICANT CHANGE UP (ref 0–0)
NRBC # BLD: 0 /100 WBCS — SIGNIFICANT CHANGE UP (ref 0–0)
OXYHGB MFR BLDA: 92.7 % — SIGNIFICANT CHANGE UP (ref 90–95)
OXYHGB MFR BLDA: 94.8 % — SIGNIFICANT CHANGE UP (ref 90–95)
OXYHGB MFR BLDA: 97.4 % — HIGH (ref 90–95)
OXYHGB MFR BLDA: 97.4 % — HIGH (ref 90–95)
OXYHGB MFR BLDA: 97.7 % — HIGH (ref 90–95)
OXYHGB MFR BLDA: 97.7 % — HIGH (ref 90–95)
OXYHGB MFR BLDA: 97.8 % — HIGH (ref 90–95)
OXYHGB MFR BLDA: 97.9 % — HIGH (ref 90–95)
PCO2 BLDA: 31 MMHG — LOW (ref 35–48)
PCO2 BLDA: 36 MMHG — SIGNIFICANT CHANGE UP (ref 35–48)
PCO2 BLDA: 39 MMHG — SIGNIFICANT CHANGE UP (ref 35–48)
PCO2 BLDA: 41 MMHG — SIGNIFICANT CHANGE UP (ref 35–48)
PCO2 BLDA: 41 MMHG — SIGNIFICANT CHANGE UP (ref 35–48)
PCO2 BLDA: 42 MMHG — SIGNIFICANT CHANGE UP (ref 35–48)
PCO2 BLDA: 48 MMHG — SIGNIFICANT CHANGE UP (ref 35–48)
PCO2 BLDA: 48 MMHG — SIGNIFICANT CHANGE UP (ref 35–48)
PCO2 BLDV: 47 MMHG — SIGNIFICANT CHANGE UP (ref 42–55)
PCO2 BLDV: 50 MMHG — SIGNIFICANT CHANGE UP (ref 42–55)
PH BLDA: 7.34 — LOW (ref 7.35–7.45)
PH BLDA: 7.35 — SIGNIFICANT CHANGE UP (ref 7.35–7.45)
PH BLDA: 7.36 — SIGNIFICANT CHANGE UP (ref 7.35–7.45)
PH BLDA: 7.37 — SIGNIFICANT CHANGE UP (ref 7.35–7.45)
PH BLDA: 7.37 — SIGNIFICANT CHANGE UP (ref 7.35–7.45)
PH BLDA: 7.42 — SIGNIFICANT CHANGE UP (ref 7.35–7.45)
PH BLDA: 7.43 — SIGNIFICANT CHANGE UP (ref 7.35–7.45)
PH BLDA: 7.51 — HIGH (ref 7.35–7.45)
PH BLDV: 7.27 — LOW (ref 7.32–7.43)
PH BLDV: 7.38 — SIGNIFICANT CHANGE UP (ref 7.32–7.43)
PHOSPHATE SERPL-MCNC: 3.8 MG/DL — SIGNIFICANT CHANGE UP (ref 2.5–4.5)
PHOSPHATE SERPL-MCNC: 4 MG/DL — SIGNIFICANT CHANGE UP (ref 2.5–4.5)
PLATELET # BLD AUTO: 130 K/UL — LOW (ref 150–400)
PLATELET # BLD AUTO: 191 K/UL — SIGNIFICANT CHANGE UP (ref 150–400)
PO2 BLDA: 229 MMHG — HIGH (ref 83–108)
PO2 BLDA: 332 MMHG — HIGH (ref 83–108)
PO2 BLDA: 385 MMHG — HIGH (ref 83–108)
PO2 BLDA: 394 MMHG — HIGH (ref 83–108)
PO2 BLDA: 426 MMHG — HIGH (ref 83–108)
PO2 BLDA: 466 MMHG — HIGH (ref 83–108)
PO2 BLDA: 70 MMHG — LOW (ref 83–108)
PO2 BLDA: 83 MMHG — SIGNIFICANT CHANGE UP (ref 83–108)
PO2 BLDV: 47 MMHG — HIGH (ref 25–45)
PO2 BLDV: 58 MMHG — HIGH (ref 25–45)
POTASSIUM BLDA-SCNC: 4.7 MMOL/L — SIGNIFICANT CHANGE UP (ref 3.5–5.1)
POTASSIUM BLDA-SCNC: 4.8 MMOL/L — SIGNIFICANT CHANGE UP (ref 3.5–5.1)
POTASSIUM BLDA-SCNC: 5.1 MMOL/L — SIGNIFICANT CHANGE UP (ref 3.5–5.1)
POTASSIUM BLDA-SCNC: 5.4 MMOL/L — HIGH (ref 3.5–5.1)
POTASSIUM BLDA-SCNC: 5.7 MMOL/L — HIGH (ref 3.5–5.1)
POTASSIUM BLDA-SCNC: 5.9 MMOL/L — HIGH (ref 3.5–5.1)
POTASSIUM BLDA-SCNC: 6 MMOL/L — HIGH (ref 3.5–5.1)
POTASSIUM BLDA-SCNC: 6.3 MMOL/L — CRITICAL HIGH (ref 3.5–5.1)
POTASSIUM BLDV-SCNC: 4.8 MMOL/L — SIGNIFICANT CHANGE UP (ref 3.5–5.1)
POTASSIUM BLDV-SCNC: 5.7 MMOL/L — HIGH (ref 3.5–5.1)
POTASSIUM SERPL-MCNC: 5.2 MMOL/L — SIGNIFICANT CHANGE UP (ref 3.5–5.3)
POTASSIUM SERPL-MCNC: 5.3 MMOL/L — SIGNIFICANT CHANGE UP (ref 3.5–5.3)
POTASSIUM SERPL-MCNC: 5.8 MMOL/L — HIGH (ref 3.5–5.3)
POTASSIUM SERPL-SCNC: 5.2 MMOL/L — SIGNIFICANT CHANGE UP (ref 3.5–5.3)
POTASSIUM SERPL-SCNC: 5.3 MMOL/L — SIGNIFICANT CHANGE UP (ref 3.5–5.3)
POTASSIUM SERPL-SCNC: 5.8 MMOL/L — HIGH (ref 3.5–5.3)
PROT SERPL-MCNC: 5.5 G/DL — LOW (ref 6–8.3)
PROT SERPL-MCNC: 5.7 G/DL — LOW (ref 6–8.3)
PROTHROM AB SERPL-ACNC: 13.6 SEC — HIGH (ref 10.5–13.4)
PROTHROM AB SERPL-ACNC: 16.4 SEC — HIGH (ref 10.5–13.4)
RBC # BLD: 4.05 M/UL — LOW (ref 4.2–5.8)
RBC # BLD: 4.69 M/UL — SIGNIFICANT CHANGE UP (ref 4.2–5.8)
RBC # FLD: 14.3 % — SIGNIFICANT CHANGE UP (ref 10.3–14.5)
RBC # FLD: 14.4 % — SIGNIFICANT CHANGE UP (ref 10.3–14.5)
SAO2 % BLDA: 100 % — HIGH (ref 94–98)
SAO2 % BLDA: 95 % — SIGNIFICANT CHANGE UP (ref 94–98)
SAO2 % BLDA: 97.4 % — SIGNIFICANT CHANGE UP (ref 94–98)
SAO2 % BLDA: 99.6 % — HIGH (ref 94–98)
SAO2 % BLDA: 99.7 % — HIGH (ref 94–98)
SAO2 % BLDV: 77.4 % — SIGNIFICANT CHANGE UP (ref 67–88)
SAO2 % BLDV: 89 % — HIGH (ref 67–88)
SODIUM BLDA-SCNC: 134 MMOL/L — LOW (ref 136–145)
SODIUM BLDA-SCNC: 135 MMOL/L — LOW (ref 136–145)
SODIUM BLDA-SCNC: 136 MMOL/L — SIGNIFICANT CHANGE UP (ref 136–145)
SODIUM BLDA-SCNC: 137 MMOL/L — SIGNIFICANT CHANGE UP (ref 136–145)
SODIUM BLDA-SCNC: 139 MMOL/L — SIGNIFICANT CHANGE UP (ref 136–145)
SODIUM BLDA-SCNC: 141 MMOL/L — SIGNIFICANT CHANGE UP (ref 136–145)
SODIUM SERPL-SCNC: 138 MMOL/L — SIGNIFICANT CHANGE UP (ref 135–145)
SODIUM SERPL-SCNC: 141 MMOL/L — SIGNIFICANT CHANGE UP (ref 135–145)
SODIUM SERPL-SCNC: 142 MMOL/L — SIGNIFICANT CHANGE UP (ref 135–145)
WBC # BLD: 16.08 K/UL — HIGH (ref 3.8–10.5)
WBC # BLD: 8.17 K/UL — SIGNIFICANT CHANGE UP (ref 3.8–10.5)
WBC # FLD AUTO: 16.08 K/UL — HIGH (ref 3.8–10.5)
WBC # FLD AUTO: 8.17 K/UL — SIGNIFICANT CHANGE UP (ref 3.8–10.5)

## 2022-11-28 PROCEDURE — 99292 CRITICAL CARE ADDL 30 MIN: CPT

## 2022-11-28 PROCEDURE — 93010 ELECTROCARDIOGRAM REPORT: CPT

## 2022-11-28 PROCEDURE — 33426 REPAIR OF MITRAL VALVE: CPT

## 2022-11-28 PROCEDURE — 33533 CABG ARTERIAL SINGLE: CPT

## 2022-11-28 PROCEDURE — 71045 X-RAY EXAM CHEST 1 VIEW: CPT | Mod: 26

## 2022-11-28 PROCEDURE — 33259 ABLATE ATRIA W/BYPASS ADD-ON: CPT

## 2022-11-28 PROCEDURE — 94010 BREATHING CAPACITY TEST: CPT | Mod: 26

## 2022-11-28 PROCEDURE — 99291 CRITICAL CARE FIRST HOUR: CPT

## 2022-11-28 RX ORDER — NOREPINEPHRINE BITARTRATE/D5W 8 MG/250ML
0.05 PLASTIC BAG, INJECTION (ML) INTRAVENOUS
Qty: 8 | Refills: 0 | Status: DISCONTINUED | OUTPATIENT
Start: 2022-11-28 | End: 2022-12-01

## 2022-11-28 RX ORDER — DEXTROSE 50 % IN WATER 50 %
25 SYRINGE (ML) INTRAVENOUS
Refills: 0 | Status: DISCONTINUED | OUTPATIENT
Start: 2022-11-28 | End: 2022-11-30

## 2022-11-28 RX ORDER — SODIUM CHLORIDE 9 MG/ML
500 INJECTION, SOLUTION INTRAVENOUS ONCE
Refills: 0 | Status: COMPLETED | OUTPATIENT
Start: 2022-11-28 | End: 2022-11-28

## 2022-11-28 RX ORDER — CHLORHEXIDINE GLUCONATE 213 G/1000ML
1 SOLUTION TOPICAL DAILY
Refills: 0 | Status: DISCONTINUED | OUTPATIENT
Start: 2022-11-28 | End: 2022-12-07

## 2022-11-28 RX ORDER — DEXTROSE 50 % IN WATER 50 %
50 SYRINGE (ML) INTRAVENOUS
Refills: 0 | Status: DISCONTINUED | OUTPATIENT
Start: 2022-11-28 | End: 2022-11-30

## 2022-11-28 RX ORDER — CHLORHEXIDINE GLUCONATE 213 G/1000ML
15 SOLUTION TOPICAL EVERY 12 HOURS
Refills: 0 | Status: DISCONTINUED | OUTPATIENT
Start: 2022-11-28 | End: 2022-11-29

## 2022-11-28 RX ORDER — DEXMEDETOMIDINE HYDROCHLORIDE IN 0.9% SODIUM CHLORIDE 4 UG/ML
0.5 INJECTION INTRAVENOUS
Qty: 400 | Refills: 0 | Status: DISCONTINUED | OUTPATIENT
Start: 2022-11-28 | End: 2022-11-29

## 2022-11-28 RX ORDER — DOBUTAMINE HCL 250MG/20ML
3 VIAL (ML) INTRAVENOUS
Qty: 500 | Refills: 0 | Status: DISCONTINUED | OUTPATIENT
Start: 2022-11-28 | End: 2022-12-02

## 2022-11-28 RX ORDER — SODIUM BICARBONATE 1 MEQ/ML
50 SYRINGE (ML) INTRAVENOUS ONCE
Refills: 0 | Status: COMPLETED | OUTPATIENT
Start: 2022-11-28 | End: 2022-11-28

## 2022-11-28 RX ORDER — CEFAZOLIN SODIUM 1 G
2000 VIAL (EA) INJECTION EVERY 8 HOURS
Refills: 0 | Status: DISCONTINUED | OUTPATIENT
Start: 2022-11-28 | End: 2022-11-28

## 2022-11-28 RX ORDER — CEFAZOLIN SODIUM 1 G
2000 VIAL (EA) INJECTION EVERY 8 HOURS
Refills: 0 | Status: COMPLETED | OUTPATIENT
Start: 2022-11-28 | End: 2022-11-30

## 2022-11-28 RX ORDER — ACETAMINOPHEN 500 MG
1000 TABLET ORAL ONCE
Refills: 0 | Status: COMPLETED | OUTPATIENT
Start: 2022-11-28 | End: 2022-11-30

## 2022-11-28 RX ORDER — IPRATROPIUM/ALBUTEROL SULFATE 18-103MCG
3 AEROSOL WITH ADAPTER (GRAM) INHALATION ONCE
Refills: 0 | Status: COMPLETED | OUTPATIENT
Start: 2022-11-28 | End: 2022-11-28

## 2022-11-28 RX ORDER — SODIUM CHLORIDE 9 MG/ML
1000 INJECTION INTRAMUSCULAR; INTRAVENOUS; SUBCUTANEOUS
Refills: 0 | Status: DISCONTINUED | OUTPATIENT
Start: 2022-11-28 | End: 2022-12-02

## 2022-11-28 RX ORDER — HEPARIN SODIUM 5000 [USP'U]/ML
7500 INJECTION INTRAVENOUS; SUBCUTANEOUS EVERY 8 HOURS
Refills: 0 | Status: DISCONTINUED | OUTPATIENT
Start: 2022-11-28 | End: 2022-12-07

## 2022-11-28 RX ORDER — VASOPRESSIN 20 [USP'U]/ML
0.04 INJECTION INTRAVENOUS
Qty: 40 | Refills: 0 | Status: DISCONTINUED | OUTPATIENT
Start: 2022-11-28 | End: 2022-12-02

## 2022-11-28 RX ORDER — SODIUM ZIRCONIUM CYCLOSILICATE 10 G/10G
10 POWDER, FOR SUSPENSION ORAL ONCE
Refills: 0 | Status: COMPLETED | OUTPATIENT
Start: 2022-11-28 | End: 2022-11-28

## 2022-11-28 RX ORDER — PANTOPRAZOLE SODIUM 20 MG/1
40 TABLET, DELAYED RELEASE ORAL ONCE
Refills: 0 | Status: COMPLETED | OUTPATIENT
Start: 2022-11-28 | End: 2022-11-28

## 2022-11-28 RX ORDER — ASPIRIN/CALCIUM CARB/MAGNESIUM 324 MG
81 TABLET ORAL DAILY
Refills: 0 | Status: DISCONTINUED | OUTPATIENT
Start: 2022-11-28 | End: 2022-12-07

## 2022-11-28 RX ORDER — INSULIN HUMAN 100 [IU]/ML
1 INJECTION, SOLUTION SUBCUTANEOUS
Qty: 100 | Refills: 0 | Status: DISCONTINUED | OUTPATIENT
Start: 2022-11-28 | End: 2022-11-30

## 2022-11-28 RX ORDER — POLYETHYLENE GLYCOL 3350 17 G/17G
17 POWDER, FOR SOLUTION ORAL DAILY
Refills: 0 | Status: DISCONTINUED | OUTPATIENT
Start: 2022-11-28 | End: 2022-12-07

## 2022-11-28 RX ORDER — PANTOPRAZOLE SODIUM 20 MG/1
40 TABLET, DELAYED RELEASE ORAL DAILY
Refills: 0 | Status: DISCONTINUED | OUTPATIENT
Start: 2022-11-29 | End: 2022-12-07

## 2022-11-28 RX ORDER — ATORVASTATIN CALCIUM 80 MG/1
80 TABLET, FILM COATED ORAL AT BEDTIME
Refills: 0 | Status: DISCONTINUED | OUTPATIENT
Start: 2022-11-28 | End: 2022-12-07

## 2022-11-28 RX ORDER — PROPOFOL 10 MG/ML
25 INJECTION, EMULSION INTRAVENOUS
Qty: 1000 | Refills: 0 | Status: DISCONTINUED | OUTPATIENT
Start: 2022-11-28 | End: 2022-11-29

## 2022-11-28 RX ORDER — ALBUMIN HUMAN 25 %
250 VIAL (ML) INTRAVENOUS
Refills: 0 | Status: COMPLETED | OUTPATIENT
Start: 2022-11-28 | End: 2022-11-28

## 2022-11-28 RX ADMIN — CHLORHEXIDINE GLUCONATE 1 APPLICATION(S): 213 SOLUTION TOPICAL at 05:27

## 2022-11-28 RX ADMIN — Medication 3 MILLILITER(S): at 23:57

## 2022-11-28 RX ADMIN — Medication 50 MILLIEQUIVALENT(S): at 23:55

## 2022-11-28 RX ADMIN — SODIUM CHLORIDE 3 MILLILITER(S): 9 INJECTION INTRAMUSCULAR; INTRAVENOUS; SUBCUTANEOUS at 05:10

## 2022-11-28 RX ADMIN — Medication 0.5 MILLIGRAM(S): at 10:03

## 2022-11-28 RX ADMIN — Medication 125 MILLILITER(S): at 20:15

## 2022-11-28 RX ADMIN — SODIUM CHLORIDE 500 MILLILITER(S): 9 INJECTION, SOLUTION INTRAVENOUS at 09:51

## 2022-11-28 RX ADMIN — Medication 125 MILLILITER(S): at 23:17

## 2022-11-28 RX ADMIN — AMIODARONE HYDROCHLORIDE 400 MILLIGRAM(S): 400 TABLET ORAL at 05:21

## 2022-11-28 RX ADMIN — HEPARIN SODIUM 12 UNIT(S)/HR: 5000 INJECTION INTRAVENOUS; SUBCUTANEOUS at 09:50

## 2022-11-28 RX ADMIN — Medication 37.5 MILLIGRAM(S): at 05:21

## 2022-11-28 RX ADMIN — CHLORHEXIDINE GLUCONATE 15 MILLILITER(S): 213 SOLUTION TOPICAL at 05:20

## 2022-11-28 RX ADMIN — PANTOPRAZOLE SODIUM 40 MILLIGRAM(S): 20 TABLET, DELAYED RELEASE ORAL at 20:48

## 2022-11-28 NOTE — DIETITIAN INITIAL EVALUATION ADULT - OTHER CALCULATIONS
IBW used to calculate needs due to pt's current body weight exceeding 120% of IBW adjusted for maintenance with increased protein demands for s/p procedure

## 2022-11-28 NOTE — DIETITIAN INITIAL EVALUATION ADULT - ADD RECOMMEND
-Restart nutrition as medically able s/p procedure    *Recommend return to DASH diet with appropriate textures/consistencies   -Encourage good PO intake s/p procedure   -Monitor need for ONS regimen   -Monitor chemistry, GI fxn, and skin integrity

## 2022-11-28 NOTE — DIETITIAN INITIAL EVALUATION ADULT - PERTINENT LABORATORY DATA
11-28    141  |  102  |  19  ----------------------------<  123<H>  5.2   |  29  |  1.43<H>    Ca    9.5      28 Nov 2022 05:47  Mg     2.4     11-28    POCT Blood Glucose.: 118 mg/dL (11-28-22 @ 06:13)  A1C with Estimated Average Glucose Result: 6.1 % (11-23-22 @ 18:53)

## 2022-11-28 NOTE — PROGRESS NOTE ADULT - SUBJECTIVE AND OBJECTIVE BOX
CTICU  CRITICAL  CARE  attending     Hand off received 					   Pertinent clinical, laboratory, radiographic, hemodynamic, echocardiographic, respiratory data, microbiologic data and chart were reviewed and analyzed frequently throughout the course of the day and night    62 years old male with HTN,  anxiety, morbid obesity.  recently he had increasing SOB, Nausea, vomiting, night sweats and  palpitations for the last few days which progressively worsened.   He was brought in by EMS to [facility] found to be in rapid atrial fibrillation -200, HTN with expiratory wheezing.   He was treated with IV Cardizem and nebulized albuterol which decreased to HR and wheezing.   IV heparin drip was started at that time and given lasix after CXR showed pulmonary edema.   Troponin found to be elevated diagnosed with NSTEMI.   Cardiac cath showed 2 vessel CAD mLAD 60% mCX 50% with EF 40% and medical management recommended.   OLEG showed severe central MR and was transferred to [facility] under Dr. Reilly for surgical evaluation and management.    S/P MVR  S/P CABG x 1 (LIMA to LAD).      HEALTH ISSUES - PROBLEM Dx:  Severe mitral valve regurgitation  CAD (coronary artery disease)  Atrial fibrillation and flutter  HTN (hypertension)  Morbid obesity  Anxiety  NSTEMI (non-ST elevation myocardial infarction)  Acute systolic congestive heart failure        FAMILY HISTORY:  No pertinent family history in first degree relatives    PAST MEDICAL & SURGICAL HISTORY:  Morbid obesity  Rapid palpitations  Anxiety  No significant past surgical history            14 system review was unremarkable    Vital signs, hemodynamic and respiratory parameters were reviewed from the bedside nursing flow sheet.  ICU Vital Signs Last 24 Hrs  T(C): 35.6 (28 Nov 2022 19:00), Max: 37.2 (27 Nov 2022 20:41)  T(F): 96 (28 Nov 2022 19:00), Max: 98.9 (27 Nov 2022 20:41)  HR: 63 (28 Nov 2022 20:00) (63 - 80)  BP: 127/75 (28 Nov 2022 14:44) (114/99 - 127/75)  BP(mean): 93 (28 Nov 2022 14:44) (87 - 93)  ABP: 113/65 (28 Nov 2022 20:00) (94/57 - 167/81)  ABP(mean): 80 (28 Nov 2022 20:00) (68 - 111)  RR: 15 (28 Nov 2022 20:00) (14 - 18)  SpO2: 95% (28 Nov 2022 20:00) (91% - 99%)    O2 Parameters below as of 28 Nov 2022 20:00  Patient On (Oxygen Delivery Method): ventilator    O2 Concentration (%): 100      Adult Advanced Hemodynamics Last 24 Hrs  CVP(mm Hg): 18 (28 Nov 2022 20:00) (15 - 19)  CVP(cm H2O): --  CO: --  CI: --  PA: --  PA(mean): --  PCWP: --  SVR: --  SVRI: --  PVR: --  PVRI: --, ABG - ( 28 Nov 2022 19:03 )  pH, Arterial: 7.34  pH, Blood: x     /  pCO2: 42    /  pO2: 81    / HCO3: 23    / Base Excess: -3.0  /  SaO2: 96.8              Mode: AC/ CMV (Assist Control/ Continuous Mandatory Ventilation)  RR (machine): 15  TV (machine): 550  FiO2: 100  PEEP: 5  ITime: 1  MAP: 9  PIP: 20    Intake and output was reviewed and the fluid balance was calculated  Daily Height in cm: 185.4 (28 Nov 2022 14:44)    Daily   I&O's Summary    27 Nov 2022 07:01  -  28 Nov 2022 07:00  --------------------------------------------------------  IN: 392.5 mL / OUT: 1000 mL / NET: -607.5 mL    28 Nov 2022 07:01  -  28 Nov 2022 20:35  --------------------------------------------------------  IN: 556.5 mL / OUT: 685 mL / NET: -128.5 mL        All lines and drain sites were assessed    Neuro: No change in the mental status from the baseline. Follows commands. Moves all 4 extremities.  Neck: No JVD.  CVS: S1, S2, No S3.  Lungs: Good air entry bilaterally.  Abd: Soft. No tenderness. + Bowel sounds.  Vascular: + DP/PT.  Extremities: No edema.  Lymphatic: Normal.  Skin: No abnormalities.      labs  CBC Full  -  ( 28 Nov 2022 19:08 )  WBC Count : 16.08 K/uL  RBC Count : 4.05 M/uL  Hemoglobin : 13.5 g/dL  Hematocrit : 39.9 %  Platelet Count - Automated : 130 K/uL  Mean Cell Volume : 98.5 fl  Mean Cell Hemoglobin : 33.3 pg  Mean Cell Hemoglobin Concentration : 33.8 gm/dL  Auto Neutrophil # : 13.91 K/uL  Auto Lymphocyte # : 1.11 K/uL  Auto Monocyte # : 0.86 K/uL  Auto Eosinophil # : 0.04 K/uL  Auto Basophil # : 0.05 K/uL  Auto Neutrophil % : 86.6 %  Auto Lymphocyte % : 6.9 %  Auto Monocyte % : 5.3 %  Auto Eosinophil % : 0.2 %  Auto Basophil % : 0.3 %    11-28    142  |  106  |  17  ----------------------------<  155<H>  5.3   |  24  |  1.29    Ca    8.8      28 Nov 2022 19:08  Phos  3.8     11-28  Mg     2.6     11-28    TPro  5.5<L>  /  Alb  3.2<L>  /  TBili  0.8  /  DBili  x   /  AST  174<H>  /  ALT  56<H>  /  AlkPhos  63  11-28    PT/INR - ( 28 Nov 2022 19:08 )   PT: 16.4 sec;   INR: 1.37          PTT - ( 28 Nov 2022 19:08 )  PTT:29.2 sec  The current medications were reviewed   MEDICATIONS  (STANDING):  acetaminophen   IVPB .. 1000 milliGRAM(s) IV Intermittent once  albumin human  5% IVPB 250 milliLiter(s) IV Intermittent every 30 minutes  aspirin enteric coated 81 milliGRAM(s) Oral daily  atorvastatin 80 milliGRAM(s) Oral at bedtime  ceFAZolin   IVPB 2000 milliGRAM(s) IV Intermittent every 8 hours  chlorhexidine 0.12% Liquid 15 milliLiter(s) Oral Mucosa every 12 hours  chlorhexidine 2% Cloths 1 Application(s) Topical daily  dexMEDEtomidine Infusion 0.5 MICROgram(s)/kG/Hr (15.6 mL/Hr) IV Continuous <Continuous>  dextrose 50% Injectable 50 milliLiter(s) IV Push every 15 minutes  dextrose 50% Injectable 25 milliLiter(s) IV Push every 15 minutes  DOBUTamine Infusion 3 MICROgram(s)/kG/Min (11.2 mL/Hr) IV Continuous <Continuous>  heparin   Injectable 5000 Unit(s) SubCutaneous every 8 hours  insulin regular Infusion 1 Unit(s)/Hr (1 mL/Hr) IV Continuous <Continuous>  norepinephrine Infusion 0.05 MICROgram(s)/kG/Min (11.7 mL/Hr) IV Continuous <Continuous>  pantoprazole  Injectable 40 milliGRAM(s) IV Push once  polyethylene glycol 3350 17 Gram(s) Oral daily  propofol Infusion 25 MICROgram(s)/kG/Min (18.7 mL/Hr) IV Continuous <Continuous>  sodium chloride 0.9%. 1000 milliLiter(s) (10 mL/Hr) IV Continuous <Continuous>  vasopressin Infusion 0.04 Unit(s)/Min (6 mL/Hr) IV Continuous <Continuous>    MEDICATIONS  (PRN):        PROBLEM LIST/ ASSESSMENT:  HEALTH ISSUES - PROBLEM Dx:  Severe mitral valve regurgitation  CAD (coronary artery disease)  Atrial fibrillation and flutter  HTN (hypertension)  Morbid obesity  Anxiety  NSTEMI (non-ST elevation myocardial infarction)  Acute systolic congestive heart failure               62 years old male with CHF due to severe mitral regurgitation.  S/P MVR  S/P Cryo maze  S/P CABG (LIMA to LAD).  Hemodynamically stable.  Good oxygenation.  Fair urine out put.        My plan includes :  WEAN to Extubate.  Low dose dobutamine.  WEAN pressors as tolerated.   Statin and Betablocker.  Close hemodynamic, ventilatory and drain monitoring and management  Monitor for arrhythmias and monitor parameters for organ perfusion  Monitor neurologic status  Monitor renal function.  Head of the bed should remain elevated to 45 deg .   Chest PT and IS will be encouraged  Monitor adequacy of oxygenation and ventilation and attempt to wean oxygen  Nutritional goals will be met using po eventually , ensure adequate caloric intake and monitor the same  Stress ulcer and VTE prophylaxis will be achieved    Glycemic control is satisfactory  Electrolytes have been repleted as necessary and wound care has been carried out. Pain control has been achieved.   Aggressive physical therapy and early mobility and ambulation goals will be met   The family was updated about the course and plan  CRITICAL CARE TIME SPENT in evaluation and management, reassessments, review and interpretation of labs and x-rays, ventilator and hemodynamic management, formulating a plan and coordinating care: ___90____ MIN.  Time does not include procedural time.  CTICU ATTENDING     					    Rhys Momin MD

## 2022-11-28 NOTE — DIETITIAN INITIAL EVALUATION ADULT - OTHER INFO
61 y/o male PMH anxiety, morbid obesity, "fast heart rate" (not on blood thinners) who c/o SOB, N&V, night sweats and  palpitations for the last few days which progressively worsened. PT was brought in by EMS to [facility] found to be in rapid atrial fibrillation -200, HTN with expiratory wheezing. Given Cardizem and albuterol which decreased to HR and wheezing. Placed on heparin drip at that time and given lasix after CXR showed pulmonary edema. Troponin found to be elevated diagnosed with NSTEMI. Subsequent cardiac cath showed 2 vessel CAD mLAD 60% mCX 50% with EF 40% and medical management recommended. Subsequent OLEG showed severe central MR    Pt unavailable for nutritional assessment x2 attempts. Rx and labs reviewed. Pt presents for SOB and increased heart rate; pt found to have severe mitral regurgitation. Pt unavailable x2 today as pt out of room to OR for mitral valve repair. NPO for procedure, but previously on DASH diet and reportedly pt tolerating diet prior to being NPO for procedure. Gather full NFPE and nutrition hx as able at f/u. No reports NVCD or difficult chew/swallow. NKA to food per chart review. RDN will continue to reassess, intervene, and monitor as appropriate.     Pain: 0 per chart review   GI: Abdomen ND/NT, +BS x4, LBM 11/27  Skin: WDI, no edema noted

## 2022-11-28 NOTE — PRE-OP CHECKLIST - SELECT TESTS ORDERED
BMP/CBC/PT/PTT/INR/Type and Cross/Type and Screen/Urinalysis/CXR/COVID-19
BMP/CBC/CMP/PT/PTT/INR/Hepatic Function/Spirometry/Type and Cross/Type and Screen/Urinalysis/UCG/EKG/CXR/POCT Blood Glucose/COVID-19

## 2022-11-28 NOTE — BRIEF OPERATIVE NOTE - NSICDXBRIEFPOSTOP_GEN_ALL_CORE_FT
POST-OP DIAGNOSIS:  CAD (coronary artery disease) 28-Nov-2022 18:41:12  Ruby Quintero  Mitral regurgitation 28-Nov-2022 18:41:19  Ruby Quintero  Atrial fibrillation and flutter 28-Nov-2022 18:41:27  Ruby Quintero

## 2022-11-28 NOTE — BRIEF OPERATIVE NOTE - NSICDXBRIEFPREOP_GEN_ALL_CORE_FT
PRE-OP DIAGNOSIS:  Mitral regurgitation 28-Nov-2022 18:40:47  Ruby Quintero  CAD (coronary artery disease) 28-Nov-2022 18:40:52  Ruby Quintero  Atrial fibrillation and flutter 28-Nov-2022 18:41:04  Ruby Quintero

## 2022-11-28 NOTE — BRIEF OPERATIVE NOTE - NSICDXBRIEFPROCEDURE_GEN_ALL_CORE_FT
PROCEDURES:  Repair, mitral valve, with OLEG 28-Nov-2022 18:39:36  Ruby Quintero  CABG, with OLEG 28-Nov-2022 18:39:46 PRASAD-LAD Ruby Quintero  Maze procedure with cardiopulmonary bypass 28-Nov-2022 18:40:22  Ruby Quintero  Clipping, left atrial appendage 28-Nov-2022 18:40:37  Ruby Quintero

## 2022-11-28 NOTE — BRIEF OPERATIVE NOTE - COMMENTS
I first assisted for the entirety of the case, including but not limited to opening, cannulation, valve repair/replacement, decannulation, and closure distal/proximal anastamoses, and chest closure.

## 2022-11-29 LAB
ALBUMIN SERPL ELPH-MCNC: 3.5 G/DL — SIGNIFICANT CHANGE UP (ref 3.3–5)
ALBUMIN SERPL ELPH-MCNC: 3.5 G/DL — SIGNIFICANT CHANGE UP (ref 3.3–5)
ALBUMIN SERPL ELPH-MCNC: 3.7 G/DL — SIGNIFICANT CHANGE UP (ref 3.3–5)
ALP SERPL-CCNC: 53 U/L — SIGNIFICANT CHANGE UP (ref 40–120)
ALP SERPL-CCNC: 55 U/L — SIGNIFICANT CHANGE UP (ref 40–120)
ALP SERPL-CCNC: 56 U/L — SIGNIFICANT CHANGE UP (ref 40–120)
ALT FLD-CCNC: 40 U/L — SIGNIFICANT CHANGE UP (ref 10–45)
ALT FLD-CCNC: 45 U/L — SIGNIFICANT CHANGE UP (ref 10–45)
ALT FLD-CCNC: 48 U/L — HIGH (ref 10–45)
ANION GAP SERPL CALC-SCNC: 10 MMOL/L — SIGNIFICANT CHANGE UP (ref 5–17)
ANION GAP SERPL CALC-SCNC: 10 MMOL/L — SIGNIFICANT CHANGE UP (ref 5–17)
ANION GAP SERPL CALC-SCNC: 11 MMOL/L — SIGNIFICANT CHANGE UP (ref 5–17)
ANION GAP SERPL CALC-SCNC: 14 MMOL/L — SIGNIFICANT CHANGE UP (ref 5–17)
APTT BLD: 28 SEC — SIGNIFICANT CHANGE UP (ref 27.5–35.5)
APTT BLD: 28.5 SEC — SIGNIFICANT CHANGE UP (ref 27.5–35.5)
APTT BLD: 36.3 SEC — HIGH (ref 27.5–35.5)
AST SERPL-CCNC: 127 U/L — HIGH (ref 10–40)
AST SERPL-CCNC: 168 U/L — HIGH (ref 10–40)
AST SERPL-CCNC: 191 U/L — HIGH (ref 10–40)
BASE EXCESS BLDV CALC-SCNC: 1.3 MMOL/L — SIGNIFICANT CHANGE UP (ref -2–3)
BASE EXCESS BLDV CALC-SCNC: 1.7 MMOL/L — SIGNIFICANT CHANGE UP (ref -2–3)
BASE EXCESS BLDV CALC-SCNC: 2.2 MMOL/L — SIGNIFICANT CHANGE UP (ref -2–3)
BILIRUB SERPL-MCNC: 0.5 MG/DL — SIGNIFICANT CHANGE UP (ref 0.2–1.2)
BILIRUB SERPL-MCNC: 0.5 MG/DL — SIGNIFICANT CHANGE UP (ref 0.2–1.2)
BILIRUB SERPL-MCNC: 0.9 MG/DL — SIGNIFICANT CHANGE UP (ref 0.2–1.2)
BUN SERPL-MCNC: 17 MG/DL — SIGNIFICANT CHANGE UP (ref 7–23)
CA-I SERPL-SCNC: 1.11 MMOL/L — LOW (ref 1.15–1.33)
CA-I SERPL-SCNC: 1.15 MMOL/L — SIGNIFICANT CHANGE UP (ref 1.15–1.33)
CA-I SERPL-SCNC: 1.18 MMOL/L — SIGNIFICANT CHANGE UP (ref 1.15–1.33)
CALCIUM SERPL-MCNC: 8.5 MG/DL — SIGNIFICANT CHANGE UP (ref 8.4–10.5)
CALCIUM SERPL-MCNC: 8.6 MG/DL — SIGNIFICANT CHANGE UP (ref 8.4–10.5)
CALCIUM SERPL-MCNC: 8.8 MG/DL — SIGNIFICANT CHANGE UP (ref 8.4–10.5)
CALCIUM SERPL-MCNC: 9.2 MG/DL — SIGNIFICANT CHANGE UP (ref 8.4–10.5)
CHLORIDE SERPL-SCNC: 101 MMOL/L — SIGNIFICANT CHANGE UP (ref 96–108)
CHLORIDE SERPL-SCNC: 105 MMOL/L — SIGNIFICANT CHANGE UP (ref 96–108)
CO2 BLDV-SCNC: 28.6 MMOL/L — HIGH (ref 22–26)
CO2 BLDV-SCNC: 29.2 MMOL/L — HIGH (ref 22–26)
CO2 BLDV-SCNC: 29.8 MMOL/L — HIGH (ref 22–26)
CO2 SERPL-SCNC: 24 MMOL/L — SIGNIFICANT CHANGE UP (ref 22–31)
CO2 SERPL-SCNC: 26 MMOL/L — SIGNIFICANT CHANGE UP (ref 22–31)
CO2 SERPL-SCNC: 26 MMOL/L — SIGNIFICANT CHANGE UP (ref 22–31)
CO2 SERPL-SCNC: 29 MMOL/L — SIGNIFICANT CHANGE UP (ref 22–31)
CREAT SERPL-MCNC: 1.31 MG/DL — HIGH (ref 0.5–1.3)
CREAT SERPL-MCNC: 1.32 MG/DL — HIGH (ref 0.5–1.3)
CREAT SERPL-MCNC: 1.33 MG/DL — HIGH (ref 0.5–1.3)
CREAT SERPL-MCNC: 1.4 MG/DL — HIGH (ref 0.5–1.3)
EGFR: 56 ML/MIN/1.73M2 — LOW
EGFR: 60 ML/MIN/1.73M2 — SIGNIFICANT CHANGE UP
EGFR: 60 ML/MIN/1.73M2 — SIGNIFICANT CHANGE UP
EGFR: 61 ML/MIN/1.73M2 — SIGNIFICANT CHANGE UP
GAS PNL BLDA: SIGNIFICANT CHANGE UP
GAS PNL BLDV: 137 MMOL/L — SIGNIFICANT CHANGE UP (ref 136–145)
GAS PNL BLDV: 138 MMOL/L — SIGNIFICANT CHANGE UP (ref 136–145)
GAS PNL BLDV: 138 MMOL/L — SIGNIFICANT CHANGE UP (ref 136–145)
GAS PNL BLDV: SIGNIFICANT CHANGE UP
GLUCOSE BLDC GLUCOMTR-MCNC: 114 MG/DL — HIGH (ref 70–99)
GLUCOSE BLDC GLUCOMTR-MCNC: 115 MG/DL — HIGH (ref 70–99)
GLUCOSE BLDC GLUCOMTR-MCNC: 117 MG/DL — HIGH (ref 70–99)
GLUCOSE BLDC GLUCOMTR-MCNC: 127 MG/DL — HIGH (ref 70–99)
GLUCOSE BLDC GLUCOMTR-MCNC: 127 MG/DL — HIGH (ref 70–99)
GLUCOSE BLDC GLUCOMTR-MCNC: 129 MG/DL — HIGH (ref 70–99)
GLUCOSE BLDC GLUCOMTR-MCNC: 130 MG/DL — HIGH (ref 70–99)
GLUCOSE BLDC GLUCOMTR-MCNC: 131 MG/DL — HIGH (ref 70–99)
GLUCOSE BLDC GLUCOMTR-MCNC: 131 MG/DL — HIGH (ref 70–99)
GLUCOSE BLDC GLUCOMTR-MCNC: 135 MG/DL — HIGH (ref 70–99)
GLUCOSE BLDC GLUCOMTR-MCNC: 138 MG/DL — HIGH (ref 70–99)
GLUCOSE BLDC GLUCOMTR-MCNC: 139 MG/DL — HIGH (ref 70–99)
GLUCOSE BLDC GLUCOMTR-MCNC: 150 MG/DL — HIGH (ref 70–99)
GLUCOSE BLDC GLUCOMTR-MCNC: 150 MG/DL — HIGH (ref 70–99)
GLUCOSE BLDC GLUCOMTR-MCNC: 153 MG/DL — HIGH (ref 70–99)
GLUCOSE BLDC GLUCOMTR-MCNC: 158 MG/DL — HIGH (ref 70–99)
GLUCOSE BLDC GLUCOMTR-MCNC: 158 MG/DL — HIGH (ref 70–99)
GLUCOSE BLDC GLUCOMTR-MCNC: 160 MG/DL — HIGH (ref 70–99)
GLUCOSE BLDC GLUCOMTR-MCNC: 163 MG/DL — HIGH (ref 70–99)
GLUCOSE BLDC GLUCOMTR-MCNC: 165 MG/DL — HIGH (ref 70–99)
GLUCOSE BLDC GLUCOMTR-MCNC: 172 MG/DL — HIGH (ref 70–99)
GLUCOSE BLDC GLUCOMTR-MCNC: 96 MG/DL — SIGNIFICANT CHANGE UP (ref 70–99)
GLUCOSE SERPL-MCNC: 138 MG/DL — HIGH (ref 70–99)
GLUCOSE SERPL-MCNC: 163 MG/DL — HIGH (ref 70–99)
GLUCOSE SERPL-MCNC: 177 MG/DL — HIGH (ref 70–99)
GLUCOSE SERPL-MCNC: 186 MG/DL — HIGH (ref 70–99)
HCO3 BLDV-SCNC: 27 MMOL/L — SIGNIFICANT CHANGE UP (ref 22–29)
HCO3 BLDV-SCNC: 28 MMOL/L — SIGNIFICANT CHANGE UP (ref 22–29)
HCO3 BLDV-SCNC: 28 MMOL/L — SIGNIFICANT CHANGE UP (ref 22–29)
HCT VFR BLD CALC: 33.9 % — LOW (ref 39–50)
HCT VFR BLD CALC: 35.6 % — LOW (ref 39–50)
HCT VFR BLD CALC: 36.4 % — LOW (ref 39–50)
HCT VFR BLD CALC: 36.7 % — LOW (ref 39–50)
HGB BLD-MCNC: 11.4 G/DL — LOW (ref 13–17)
HGB BLD-MCNC: 12 G/DL — LOW (ref 13–17)
HGB BLD-MCNC: 12.3 G/DL — LOW (ref 13–17)
HGB BLD-MCNC: 12.6 G/DL — LOW (ref 13–17)
INR BLD: 1.27 — HIGH (ref 0.88–1.16)
INR BLD: 1.3 — HIGH (ref 0.88–1.16)
INR BLD: 1.31 — HIGH (ref 0.88–1.16)
LACTATE SERPL-SCNC: 1.3 MMOL/L — SIGNIFICANT CHANGE UP (ref 0.5–2)
LACTATE SERPL-SCNC: 2.8 MMOL/L — HIGH (ref 0.5–2)
LACTATE SERPL-SCNC: 3 MMOL/L — HIGH (ref 0.5–2)
LACTATE SERPL-SCNC: 3.1 MMOL/L — HIGH (ref 0.5–2)
LACTATE SERPL-SCNC: 4.4 MMOL/L — CRITICAL HIGH (ref 0.5–2)
LACTATE SERPL-SCNC: 5.1 MMOL/L — CRITICAL HIGH (ref 0.5–2)
MAGNESIUM SERPL-MCNC: 2 MG/DL — SIGNIFICANT CHANGE UP (ref 1.6–2.6)
MAGNESIUM SERPL-MCNC: 2.1 MG/DL — SIGNIFICANT CHANGE UP (ref 1.6–2.6)
MAGNESIUM SERPL-MCNC: 2.3 MG/DL — SIGNIFICANT CHANGE UP (ref 1.6–2.6)
MAGNESIUM SERPL-MCNC: 2.3 MG/DL — SIGNIFICANT CHANGE UP (ref 1.6–2.6)
MCHC RBC-ENTMCNC: 32.9 PG — SIGNIFICANT CHANGE UP (ref 27–34)
MCHC RBC-ENTMCNC: 33.2 PG — SIGNIFICANT CHANGE UP (ref 27–34)
MCHC RBC-ENTMCNC: 33.2 PG — SIGNIFICANT CHANGE UP (ref 27–34)
MCHC RBC-ENTMCNC: 33.4 PG — SIGNIFICANT CHANGE UP (ref 27–34)
MCHC RBC-ENTMCNC: 33.6 GM/DL — SIGNIFICANT CHANGE UP (ref 32–36)
MCHC RBC-ENTMCNC: 33.7 GM/DL — SIGNIFICANT CHANGE UP (ref 32–36)
MCHC RBC-ENTMCNC: 33.8 GM/DL — SIGNIFICANT CHANGE UP (ref 32–36)
MCHC RBC-ENTMCNC: 34.3 GM/DL — SIGNIFICANT CHANGE UP (ref 32–36)
MCV RBC AUTO: 97.3 FL — SIGNIFICANT CHANGE UP (ref 80–100)
MCV RBC AUTO: 97.5 FL — SIGNIFICANT CHANGE UP (ref 80–100)
MCV RBC AUTO: 98.4 FL — SIGNIFICANT CHANGE UP (ref 80–100)
MCV RBC AUTO: 98.8 FL — SIGNIFICANT CHANGE UP (ref 80–100)
NRBC # BLD: 0 /100 WBCS — SIGNIFICANT CHANGE UP (ref 0–0)
PCO2 BLDV: 47 MMHG — SIGNIFICANT CHANGE UP (ref 42–55)
PCO2 BLDV: 48 MMHG — SIGNIFICANT CHANGE UP (ref 42–55)
PCO2 BLDV: 49 MMHG — SIGNIFICANT CHANGE UP (ref 42–55)
PH BLDV: 7.37 — SIGNIFICANT CHANGE UP (ref 7.32–7.43)
PHOSPHATE SERPL-MCNC: 3 MG/DL — SIGNIFICANT CHANGE UP (ref 2.5–4.5)
PHOSPHATE SERPL-MCNC: 4 MG/DL — SIGNIFICANT CHANGE UP (ref 2.5–4.5)
PHOSPHATE SERPL-MCNC: 4.2 MG/DL — SIGNIFICANT CHANGE UP (ref 2.5–4.5)
PHOSPHATE SERPL-MCNC: 4.6 MG/DL — HIGH (ref 2.5–4.5)
PLATELET # BLD AUTO: 102 K/UL — LOW (ref 150–400)
PLATELET # BLD AUTO: 119 K/UL — LOW (ref 150–400)
PLATELET # BLD AUTO: 120 K/UL — LOW (ref 150–400)
PLATELET # BLD AUTO: 121 K/UL — LOW (ref 150–400)
PO2 BLDV: 35 MMHG — SIGNIFICANT CHANGE UP (ref 25–45)
PO2 BLDV: 39 MMHG — SIGNIFICANT CHANGE UP (ref 25–45)
PO2 BLDV: 43 MMHG — SIGNIFICANT CHANGE UP (ref 25–45)
POTASSIUM BLDV-SCNC: 3.8 MMOL/L — SIGNIFICANT CHANGE UP (ref 3.5–5.1)
POTASSIUM BLDV-SCNC: 4.1 MMOL/L — SIGNIFICANT CHANGE UP (ref 3.5–5.1)
POTASSIUM BLDV-SCNC: 4.1 MMOL/L — SIGNIFICANT CHANGE UP (ref 3.5–5.1)
POTASSIUM SERPL-MCNC: 3.8 MMOL/L — SIGNIFICANT CHANGE UP (ref 3.5–5.3)
POTASSIUM SERPL-MCNC: 4 MMOL/L — SIGNIFICANT CHANGE UP (ref 3.5–5.3)
POTASSIUM SERPL-MCNC: 4.4 MMOL/L — SIGNIFICANT CHANGE UP (ref 3.5–5.3)
POTASSIUM SERPL-MCNC: 4.5 MMOL/L — SIGNIFICANT CHANGE UP (ref 3.5–5.3)
POTASSIUM SERPL-SCNC: 3.8 MMOL/L — SIGNIFICANT CHANGE UP (ref 3.5–5.3)
POTASSIUM SERPL-SCNC: 4 MMOL/L — SIGNIFICANT CHANGE UP (ref 3.5–5.3)
POTASSIUM SERPL-SCNC: 4.4 MMOL/L — SIGNIFICANT CHANGE UP (ref 3.5–5.3)
POTASSIUM SERPL-SCNC: 4.5 MMOL/L — SIGNIFICANT CHANGE UP (ref 3.5–5.3)
PROT SERPL-MCNC: 5.5 G/DL — LOW (ref 6–8.3)
PROT SERPL-MCNC: 5.8 G/DL — LOW (ref 6–8.3)
PROT SERPL-MCNC: 5.9 G/DL — LOW (ref 6–8.3)
PROTHROM AB SERPL-ACNC: 15.2 SEC — HIGH (ref 10.5–13.4)
PROTHROM AB SERPL-ACNC: 15.5 SEC — HIGH (ref 10.5–13.4)
PROTHROM AB SERPL-ACNC: 15.6 SEC — HIGH (ref 10.5–13.4)
RBC # BLD: 3.43 M/UL — LOW (ref 4.2–5.8)
RBC # BLD: 3.65 M/UL — LOW (ref 4.2–5.8)
RBC # BLD: 3.7 M/UL — LOW (ref 4.2–5.8)
RBC # BLD: 3.77 M/UL — LOW (ref 4.2–5.8)
RBC # FLD: 14.2 % — SIGNIFICANT CHANGE UP (ref 10.3–14.5)
RBC # FLD: 14.3 % — SIGNIFICANT CHANGE UP (ref 10.3–14.5)
RBC # FLD: 14.4 % — SIGNIFICANT CHANGE UP (ref 10.3–14.5)
RBC # FLD: 14.6 % — HIGH (ref 10.3–14.5)
SAO2 % BLDV: 57.1 % — LOW (ref 67–88)
SAO2 % BLDV: 66 % — LOW (ref 67–88)
SAO2 % BLDV: 73.6 % — SIGNIFICANT CHANGE UP (ref 67–88)
SODIUM SERPL-SCNC: 138 MMOL/L — SIGNIFICANT CHANGE UP (ref 135–145)
SODIUM SERPL-SCNC: 139 MMOL/L — SIGNIFICANT CHANGE UP (ref 135–145)
SODIUM SERPL-SCNC: 140 MMOL/L — SIGNIFICANT CHANGE UP (ref 135–145)
SODIUM SERPL-SCNC: 141 MMOL/L — SIGNIFICANT CHANGE UP (ref 135–145)
WBC # BLD: 10.19 K/UL — SIGNIFICANT CHANGE UP (ref 3.8–10.5)
WBC # BLD: 11.11 K/UL — HIGH (ref 3.8–10.5)
WBC # BLD: 11.19 K/UL — HIGH (ref 3.8–10.5)
WBC # BLD: 11.38 K/UL — HIGH (ref 3.8–10.5)
WBC # FLD AUTO: 10.19 K/UL — SIGNIFICANT CHANGE UP (ref 3.8–10.5)
WBC # FLD AUTO: 11.11 K/UL — HIGH (ref 3.8–10.5)
WBC # FLD AUTO: 11.19 K/UL — HIGH (ref 3.8–10.5)
WBC # FLD AUTO: 11.38 K/UL — HIGH (ref 3.8–10.5)

## 2022-11-29 PROCEDURE — 99292 CRITICAL CARE ADDL 30 MIN: CPT

## 2022-11-29 PROCEDURE — 71045 X-RAY EXAM CHEST 1 VIEW: CPT | Mod: 26

## 2022-11-29 PROCEDURE — 99233 SBSQ HOSP IP/OBS HIGH 50: CPT

## 2022-11-29 PROCEDURE — 99291 CRITICAL CARE FIRST HOUR: CPT

## 2022-11-29 RX ORDER — BUDESONIDE, MICRONIZED 100 %
0.5 POWDER (GRAM) MISCELLANEOUS ONCE
Refills: 0 | Status: COMPLETED | OUTPATIENT
Start: 2022-11-29 | End: 2022-11-29

## 2022-11-29 RX ORDER — IPRATROPIUM/ALBUTEROL SULFATE 18-103MCG
3 AEROSOL WITH ADAPTER (GRAM) INHALATION ONCE
Refills: 0 | Status: COMPLETED | OUTPATIENT
Start: 2022-11-29 | End: 2022-11-29

## 2022-11-29 RX ORDER — FUROSEMIDE 40 MG
40 TABLET ORAL ONCE
Refills: 0 | Status: COMPLETED | OUTPATIENT
Start: 2022-11-29 | End: 2022-11-29

## 2022-11-29 RX ORDER — CALCIUM GLUCONATE 100 MG/ML
2 VIAL (ML) INTRAVENOUS ONCE
Refills: 0 | Status: COMPLETED | OUTPATIENT
Start: 2022-11-29 | End: 2022-11-29

## 2022-11-29 RX ORDER — FUROSEMIDE 40 MG
20 TABLET ORAL ONCE
Refills: 0 | Status: COMPLETED | OUTPATIENT
Start: 2022-11-29 | End: 2022-11-29

## 2022-11-29 RX ORDER — AMIODARONE HYDROCHLORIDE 400 MG/1
150 TABLET ORAL ONCE
Refills: 0 | Status: COMPLETED | OUTPATIENT
Start: 2022-11-29 | End: 2022-11-29

## 2022-11-29 RX ORDER — ACETAMINOPHEN 500 MG
1000 TABLET ORAL ONCE
Refills: 0 | Status: COMPLETED | OUTPATIENT
Start: 2022-11-29 | End: 2022-11-29

## 2022-11-29 RX ORDER — POTASSIUM CHLORIDE 20 MEQ
20 PACKET (EA) ORAL ONCE
Refills: 0 | Status: COMPLETED | OUTPATIENT
Start: 2022-11-29 | End: 2022-11-29

## 2022-11-29 RX ADMIN — CHLORHEXIDINE GLUCONATE 1 APPLICATION(S): 213 SOLUTION TOPICAL at 12:59

## 2022-11-29 RX ADMIN — Medication 11.7 MICROGRAM(S)/KG/MIN: at 11:00

## 2022-11-29 RX ADMIN — Medication 100 MILLIEQUIVALENT(S): at 14:21

## 2022-11-29 RX ADMIN — Medication 11.2 MICROGRAM(S)/KG/MIN: at 11:00

## 2022-11-29 RX ADMIN — Medication 3 MILLILITER(S): at 03:25

## 2022-11-29 RX ADMIN — Medication 200 GRAM(S): at 10:59

## 2022-11-29 RX ADMIN — Medication 200 GRAM(S): at 18:56

## 2022-11-29 RX ADMIN — INSULIN HUMAN 1 UNIT(S)/HR: 100 INJECTION, SOLUTION SUBCUTANEOUS at 11:00

## 2022-11-29 RX ADMIN — POLYETHYLENE GLYCOL 3350 17 GRAM(S): 17 POWDER, FOR SOLUTION ORAL at 13:00

## 2022-11-29 RX ADMIN — Medication 81 MILLIGRAM(S): at 12:59

## 2022-11-29 RX ADMIN — Medication 400 MILLIGRAM(S): at 18:54

## 2022-11-29 RX ADMIN — Medication 0.5 MILLIGRAM(S): at 03:55

## 2022-11-29 RX ADMIN — CHLORHEXIDINE GLUCONATE 15 MILLILITER(S): 213 SOLUTION TOPICAL at 06:12

## 2022-11-29 RX ADMIN — Medication 40 MILLIGRAM(S): at 22:20

## 2022-11-29 RX ADMIN — Medication 11.2 MICROGRAM(S)/KG/MIN: at 22:49

## 2022-11-29 RX ADMIN — HEPARIN SODIUM 7500 UNIT(S): 5000 INJECTION INTRAVENOUS; SUBCUTANEOUS at 21:10

## 2022-11-29 RX ADMIN — Medication 2000 MILLIGRAM(S): at 18:54

## 2022-11-29 RX ADMIN — AMIODARONE HYDROCHLORIDE 600 MILLIGRAM(S): 400 TABLET ORAL at 21:35

## 2022-11-29 RX ADMIN — PANTOPRAZOLE SODIUM 40 MILLIGRAM(S): 20 TABLET, DELAYED RELEASE ORAL at 12:59

## 2022-11-29 RX ADMIN — ATORVASTATIN CALCIUM 80 MILLIGRAM(S): 80 TABLET, FILM COATED ORAL at 21:09

## 2022-11-29 RX ADMIN — Medication 2000 MILLIGRAM(S): at 03:29

## 2022-11-29 RX ADMIN — Medication 40 MILLIGRAM(S): at 05:30

## 2022-11-29 RX ADMIN — HEPARIN SODIUM 7500 UNIT(S): 5000 INJECTION INTRAVENOUS; SUBCUTANEOUS at 06:12

## 2022-11-29 RX ADMIN — Medication 1000 MILLIGRAM(S): at 19:21

## 2022-11-29 RX ADMIN — PROPOFOL 18.7 MICROGRAM(S)/KG/MIN: 10 INJECTION, EMULSION INTRAVENOUS at 14:35

## 2022-11-29 RX ADMIN — Medication 40 MILLIGRAM(S): at 11:48

## 2022-11-29 RX ADMIN — AMIODARONE HYDROCHLORIDE 300 MILLIGRAM(S): 400 TABLET ORAL at 20:15

## 2022-11-29 RX ADMIN — HEPARIN SODIUM 7500 UNIT(S): 5000 INJECTION INTRAVENOUS; SUBCUTANEOUS at 13:36

## 2022-11-29 RX ADMIN — PROPOFOL 18.7 MICROGRAM(S)/KG/MIN: 10 INJECTION, EMULSION INTRAVENOUS at 11:00

## 2022-11-29 RX ADMIN — Medication 20 MILLIGRAM(S): at 18:06

## 2022-11-29 RX ADMIN — Medication 2000 MILLIGRAM(S): at 12:58

## 2022-11-29 NOTE — PROGRESS NOTE ADULT - SUBJECTIVE AND OBJECTIVE BOX
INTERVAL HPI/OVERNIGHT EVENTS:    POD#1 MV ring repair/CABG x 1/CHANDRIKA clip/MAZE procedure   EF     61yo obest male (BMI 36), anxiety with sxs palpitations/N/V and night sweats - reports Hx "fast HR" - no on AC    presented to [facility] - Fib/RVR (150-200) PT was brought in by EMS to [facility] found to be in rapid atrial fibrillation -200, HTN with expiratory wheezing. Given Cardizem and albuterol which decreased to HR and wheezing. Placed on heparin drip at that time and given lasix after CXR showed pulmonary edema. Troponin found to be elevated diagnosed with NSTEMI. Subsequent cardiac cath showed 2 vessel CAD mLAD 60% mCX 50% with EF 40% and medical management recommended. Subsequent OLEG showed severe central MR and was transferred to [facility] under Dr. Reilly for surgical evaluation and management. (24 Nov 2022 05:16)    intraop - no blood/2 L crystalloid     PAST MEDICAL & SURGICAL HISTORY:  Morbid obesity      Rapid palpitations      Anxiety      No significant past surgical history            ICU Vital Signs Last 24 Hrs  T(C): 35.8 (29 Nov 2022 05:01), Max: 36.3 (28 Nov 2022 09:48)  T(F): 96.5 (29 Nov 2022 05:01), Max: 97.4 (28 Nov 2022 09:48)  HR: 63 (29 Nov 2022 07:00) (51 - 73)  BP: 140/67 (29 Nov 2022 07:00) (112/57 - 140/67)  BP(mean): 96 (29 Nov 2022 07:00) (78 - 96)  ABP: 144/62 (29 Nov 2022 07:00) (94/57 - 167/81)  ABP(mean): 84 (29 Nov 2022 07:00) (68 - 111)  RR: 20 (29 Nov 2022 07:00) (14 - 20)  SpO2: 93% (29 Nov 2022 07:00) (91% - 100%)    O2 Parameters below as of 29 Nov 2022 07:00  Patient On (Oxygen Delivery Method): ventilator,CMV 50%/600/15/8    O2 Concentration (%): 50      Qtts:     I&O's Summary    28 Nov 2022 07:01  -  29 Nov 2022 07:00  --------------------------------------------------------  IN: 1797.7 mL / OUT: 2525 mL / NET: -727.3 mL        Mode: AC/ CMV (Assist Control/ Continuous Mandatory Ventilation)  RR (machine): 15  TV (machine): 600  FiO2: 70  PEEP: 5  ITime: 1  MAP: 11  PIP: 24      Physical Exam    Heart  Lungs  Abd  Ext  Chest  Neuro  Skin    LABS:                        12.0   11.11 )-----------( 121      ( 29 Nov 2022 01:52 )             35.6     11-29    141  |  105  |  17  ----------------------------<  186<H>  4.4   |  26  |  1.31<H>    Ca    8.6      29 Nov 2022 01:52  Phos  3.0     11-29  Mg     2.3     11-29    TPro  5.5<L>  /  Alb  3.7  /  TBili  0.9  /  DBili  x   /  AST  191<H>  /  ALT  48<H>  /  AlkPhos  56  11-29    PT/INR - ( 29 Nov 2022 01:52 )   PT: 15.6 sec;   INR: 1.31          PTT - ( 29 Nov 2022 01:52 )  PTT:36.3 sec    ABG - ( 29 Nov 2022 05:41 )  pH, Arterial: 7.41  pH, Blood: x     /  pCO2: 39    /  pO2: 81    / HCO3: 25    / Base Excess: 0.1   /  SaO2: 97.4                RADIOLOGY & ADDITIONAL STUDIES:    I have spent/provided stated minutes of critical care time to this patient:  INTERVAL HPI/OVERNIGHT EVENTS:    POD#1 MV ring repair/CABG x 1/CHANDRIKA clip/MAZE procedure   EF 30%    63yo obest male (BMI 36), anxiety with sxs palpitations/N/V and night sweats - reports Hx "fast HR" - no on AC    presented to [facility] - Fib/RVR (150-200) with cardiac wheeze/pulm edema  IV heparin/cardizem/lasix and nebs given  (+)CE - (+)NSTEMI    Cath: 2 v CAD mLAD 60% mCX 50% with EF 40%  OLEG: severe central MR     Transferred to [facility] 11/23  ECHO 11/25: left atrium is moderately dilated. LVH/EF 30% with global hypokinesis  Normal right ventricular size and systolic function. No significant valvular disease.    to OR 11/28: Intraop - no blood/2 L crystalloid                     ICU Vital Signs Last 24 Hrs  T(C): 35.8 (29 Nov 2022 05:01), Max: 36.3 (28 Nov 2022 09:48)  T(F): 96.5 (29 Nov 2022 05:01), Max: 97.4 (28 Nov 2022 09:48)  HR: 63 (29 Nov 2022 07:00) (51 - 73)  BP: 140/67 (29 Nov 2022 07:00) (112/57 - 140/67)  BP(mean): 96 (29 Nov 2022 07:00) (78 - 96)  ABP: 144/62 (29 Nov 2022 07:00) (94/57 - 167/81)  ABP(mean): 84 (29 Nov 2022 07:00) (68 - 111)  RR: 20 (29 Nov 2022 07:00) (14 - 20)  SpO2: 93% (29 Nov 2022 07:00) (91% - 100%)    O2 Parameters below as of 29 Nov 2022 07:00  Patient On (Oxygen Delivery Method): ventilator,CMV 50%/600/15/8    O2 Concentration (%): 50      Qtts:     I&O's Summary    28 Nov 2022 07:01  -  29 Nov 2022 07:00  --------------------------------------------------------  IN: 1797.7 mL / OUT: 2525 mL / NET: -727.3 mL        Mode: AC/ CMV (Assist Control/ Continuous Mandatory Ventilation)  RR (machine): 15  TV (machine): 600  FiO2: 70  PEEP: 5  ITime: 1  MAP: 11  PIP: 24      Physical Exam    Heart  Lungs  Abd  Ext  Chest  Neuro  Skin    LABS:                        12.0   11.11 )-----------( 121      ( 29 Nov 2022 01:52 )             35.6     11-29    141  |  105  |  17  ----------------------------<  186<H>  4.4   |  26  |  1.31<H>    Ca    8.6      29 Nov 2022 01:52  Phos  3.0     11-29  Mg     2.3     11-29    TPro  5.5<L>  /  Alb  3.7  /  TBili  0.9  /  DBili  x   /  AST  191<H>  /  ALT  48<H>  /  AlkPhos  56  11-29    PT/INR - ( 29 Nov 2022 01:52 )   PT: 15.6 sec;   INR: 1.31          PTT - ( 29 Nov 2022 01:52 )  PTT:36.3 sec    ABG - ( 29 Nov 2022 05:41 )  pH, Arterial: 7.41  pH, Blood: x     /  pCO2: 39    /  pO2: 81    / HCO3: 25    / Base Excess: 0.1   /  SaO2: 97.4                RADIOLOGY & ADDITIONAL STUDIES:    I have spent/provided stated minutes of critical care time to this patient:  INTERVAL HPI/OVERNIGHT EVENTS:    POD#1 MV ring repair/CABG x 1/CHANDRIKA clip/MAZE procedure   EF 30%    63yo obest male (BMI 36), anxiety with sxs palpitations/N/V and night sweats - reports Hx "fast HR" - no on AC    presented to [facility] - Fib/RVR (150-200) with cardiac wheeze/pulm edema  IV heparin/cardizem/lasix and nebs given  (+)CE - (+)NSTEMI    Cath: 2 v CAD mLAD 60% mCX 50% with EF 40%  OLEG: severe central MR    Transferred to [facility]   ECHO : left atrium is moderately dilated. LVH/EF 30% with global hypokinesis  Normal right ventricular size and systolic function. No significant valvular disease.    to OR : Intraop - no blood/2 L crystalloid     treated for hyperkalemia post-op (5.8) - repeat 4.4    overnight remains on vent 70% PEEP 8  Levophed and Vasopressin;  increased from 3 to 5 in setting of rising LA - most recent 5    patient woke and followed commands - moved all extremities - now sedated with propofol and precedex   given lasix dosing early am (40mg) with excellent response - /350/225 last 3 hours    ICU Vital Signs Last 24 Hrs  T(C): 35.8 (2022 05:01), Max: 36.3 (2022 09:48)  T(F): 96.5 (2022 05:01), Max: 97.4 (2022 09:48)  HR: 63 (2022 07:00) (51 - 73) sinus danny   BP: 140/67 (2022 07:00) (112/57 - 140/67)  BP(mean): 96 (2022 07:00) (78 - 96)  ABP: 144/62 (2022 07:00) (94/57 - 167/81)  ABP(mean): 84 (2022 07:00) (68 - 111)  RR: 20 (2022 07:00) (14 - 20)  SpO2: 93% (2022 07:00) (91% - 100%) 70%/PEEP 8 - now 50%    Qtts:    5  Levophed  0.05  Vaso 0.04 (6)  propofol  precedex - d/c     I&O's Summary    2022 07:01  -  2022 07:00  --------------------------------------------------------  IN: 1797.7 mL / OUT: 2525 mL / NET: -727.3 mL        Mode: AC/ CMV (Assist Control/ Continuous Mandatory Ventilation)  RR (machine): 15  TV (machine): 600  FiO2: 70  PEEP: 5  ITime: 1  MAP: 11  PIP: 24      Physical Exam    Heart - regular (-)rub/gallop  Lungs - BS appreciated bilaterally - no rhonchi/wheeze  Abd - (+)BS Soft NTND (-)r/r/g  Ext - warm to touch; well perfused - no cyanosis/clubbing   Chest - op bandage in place  Neuro - pupils reactive to light - sedate at this time  Skin - no rash     LABS:                        12.0   11.11 )-----------( 121      ( 2022 01:52 )             35.6         141  |  105  |  17  ----------------------------<  186<H>  4.4   |  26  |  1.31<H>    Ca    8.6      2022 01:52  Phos  3.0       Mg     2.3         TPro  5.5<L>  /  Alb  3.7  /  TBili  0.9  /  DBili  x   /  AST  191<H>  /  ALT  48<H>  /  AlkPhos  56      PT/INR - ( 2022 01:52 )   PT: 15.6 sec;   INR: 1.31          PTT - ( 2022 01:52 )  PTT:36.3 sec    ABG - ( 2022 05:41 )  pH, Arterial: 7.41  pH, Blood: x     /  pCO2: 39    /  pO2: 81    / HCO3: 25    / Base Excess: 0.1   /  SaO2: 97.4      RADIOLOGY & ADDITIONAL STUDIES: xray access down - to review at later time when available    harishnet iwth palpitatons presenting with Fib/RVR/NSTEMI and EF 30% preOp now s/p MV ring repair/CABG x 1/CHANDRIKA clip and CryoMAZE with cardiogenic shock and bradycardia with rising LA overnight     1. CV  remains on Levophed and Vasopressin - titrate to maintain MAP 65 or greater  rising LA - cont to monitor and trend closely - stat send now  on Dobutamine now 5 (from 3) - inc in setting of bradycardia and inc LA   warm and well perfused; excellent response to Lasix dosing  will trial pacing to inc CO   preOp EF 30%  ASA/statin   complete periop Abx prophylaxis     2. Pulm   serial ABG to optimize oxygenation and ventilation  monitor chest tube output   lasix dosing intermittent - hope to see improvement in oxygenation with diuresis  assess for weaning to extubate today  d/c precedex - reserve for weaning particularly in light of known anxiety     3. Renal   post-op hyperkalemia - Tx and resolved  monitor Cr/UO and lytes closely     maintain glycemic control - Hg A1c 6.1    d/w patient/staff and CTS    I have spent/provided stated minutes of critical care time to this patient: 90

## 2022-11-29 NOTE — PROGRESS NOTE ADULT - SUBJECTIVE AND OBJECTIVE BOX
CTICU  CRITICAL  CARE  attending     Hand off received 					   Pertinent clinical, laboratory, radiographic, hemodynamic, echocardiographic, respiratory data, microbiologic data and chart were reviewed and analyzed frequently throughout the course of the day and night      62 years old male with HTN,  anxiety, morbid obesity.  recently he had increasing SOB, Nausea, vomiting, night sweats and  palpitations for the last few days which progressively worsened.   He was brought in by EMS to [facility] found to be in rapid atrial fibrillation -200, HTN with expiratory wheezing.   He was treated with IV Cardizem and nebulized albuterol which decreased to HR and wheezing.   IV heparin drip was started at that time and given lasix after CXR showed pulmonary edema.   Troponin found to be elevated diagnosed with NSTEMI.   Cardiac cath showed 2 vessel CAD mLAD 60% mCX 50% with EF 40% and medical management recommended.   OLEG showed severe central MR and was transferred to [facility] under Dr. Reilly for surgical evaluation and management.    S/P MVR  S/P CABG x 1 (LIMA to LAD).        HEALTH ISSUES - PROBLEM Dx:  Severe mitral valve regurgitation  CAD (coronary artery disease)  Atrial fibrillation and flutter  HTN (hypertension)  Morbid obesity  Anxiety  NSTEMI (non-ST elevation myocardial infarction)  Acute systolic congestive heart failure        FAMILY HISTORY:  No pertinent family history in first degree relatives    PAST MEDICAL & SURGICAL HISTORY:  Morbid obesity  Rapid palpitations  Anxiety  No significant past surgical history            14 system review was unremarkable    Vital signs, hemodynamic and respiratory parameters were reviewed from the bedside nursing flow sheet.  ICU Vital Signs Last 24 Hrs  T(C): 36.1 (29 Nov 2022 21:08), Max: 36.3 (29 Nov 2022 14:42)  T(F): 97 (29 Nov 2022 21:08), Max: 97.4 (29 Nov 2022 14:42)  HR: 105 (29 Nov 2022 21:20) (51 - 132)  BP: 127/60 (29 Nov 2022 08:00) (112/57 - 140/67)  BP(mean): 86 (29 Nov 2022 08:00) (78 - 96)  ABP: 116/60 (29 Nov 2022 21:00) (111/63 - 144/62)  ABP(mean): 76 (29 Nov 2022 21:00) (75 - 94)  RR: 18 (29 Nov 2022 21:20) (8 - 25)  SpO2: 94% (29 Nov 2022 21:20) (90% - 99%)    O2 Parameters below as of 29 Nov 2022 21:20  Patient On (Oxygen Delivery Method): nasal cannula, high flow  O2 Flow (L/min): 50  O2 Concentration (%): 50      Adult Advanced Hemodynamics Last 24 Hrs  CVP(mm Hg): 13 (29 Nov 2022 21:00) (12 - 22)  CVP(cm H2O): --  CO: --  CI: --  PA: --  PA(mean): --  PCWP: --  SVR: --  SVRI: --  PVR: --  PVRI: --, ABG - ( 29 Nov 2022 20:29 )  pH, Arterial: 7.43  pH, Blood: x     /  pCO2: 41    /  pO2: 94    / HCO3: 27    / Base Excess: 2.6   /  SaO2: 99.4              Mode: AC/ CMV (Assist Control/ Continuous Mandatory Ventilation)  RR (machine): 15  TV (machine): 600  FiO2: 60  PEEP: 8  ITime: 1  MAP: 13  PIP: 26    Intake and output was reviewed and the fluid balance was calculated  Daily     Daily   I&O's Summary    28 Nov 2022 07:01  -  29 Nov 2022 07:00  --------------------------------------------------------  IN: 1860.1 mL / OUT: 2525 mL / NET: -664.9 mL    29 Nov 2022 07:01  -  29 Nov 2022 21:53  --------------------------------------------------------  IN: 1627 mL / OUT: 2685 mL / NET: -1058 mL        All lines and drain sites were assessed    Neuro: No change in the mental status from the baseline. Follows commands. Moves all 4 extremities.  Neck: No JVD.  CVS: S1, S2, No S3.  Lungs: Good air entry bilaterally.  Abd: Soft. No tenderness. + Bowel sounds.  Vascular: + DP/PT.  Extremities: No edema.  Lymphatic: Normal.  Skin: No abnormalities.      labs  CBC Full  -  ( 29 Nov 2022 20:32 )  WBC Count : 11.19 K/uL  RBC Count : 3.43 M/uL  Hemoglobin : 11.4 g/dL  Hematocrit : 33.9 %  Platelet Count - Automated : 102 K/uL  Mean Cell Volume : 98.8 fl  Mean Cell Hemoglobin : 33.2 pg  Mean Cell Hemoglobin Concentration : 33.6 gm/dL  Auto Neutrophil # : x  Auto Lymphocyte # : x  Auto Monocyte # : x  Auto Eosinophil # : x  Auto Basophil # : x  Auto Neutrophil % : x  Auto Lymphocyte % : x  Auto Monocyte % : x  Auto Eosinophil % : x  Auto Basophil % : x    11-29    140  |  101  |  17  ----------------------------<  163<H>  3.8   |  29  |  1.40<H>    Ca    9.2      29 Nov 2022 20:32  Phos  4.6     11-29  Mg     2.0     11-29    TPro  5.8<L>  /  Alb  3.5  /  TBili  0.5  /  DBili  x   /  AST  127<H>  /  ALT  40  /  AlkPhos  53  11-29    PT/INR - ( 29 Nov 2022 12:38 )   PT: 15.5 sec;   INR: 1.30          PTT - ( 29 Nov 2022 12:38 )  PTT:28.5 sec  The current medications were reviewed   MEDICATIONS  (STANDING):  acetaminophen   IVPB .. 1000 milliGRAM(s) IV Intermittent once  aMIOdarone IVPB 150 milliGRAM(s) IV Intermittent once  aspirin enteric coated 81 milliGRAM(s) Oral daily  atorvastatin 80 milliGRAM(s) Oral at bedtime  ceFAZolin  Injectable. 2000 milliGRAM(s) IV Push every 8 hours  chlorhexidine 2% Cloths 1 Application(s) Topical daily  dextrose 50% Injectable 50 milliLiter(s) IV Push every 15 minutes  dextrose 50% Injectable 25 milliLiter(s) IV Push every 15 minutes  DOBUTamine Infusion 3 MICROgram(s)/kG/Min (11.2 mL/Hr) IV Continuous <Continuous>  heparin   Injectable 7500 Unit(s) SubCutaneous every 8 hours  insulin regular Infusion 1 Unit(s)/Hr (1 mL/Hr) IV Continuous <Continuous>  norepinephrine Infusion 0.05 MICROgram(s)/kG/Min (11.7 mL/Hr) IV Continuous <Continuous>  pantoprazole    Tablet 40 milliGRAM(s) Oral daily  polyethylene glycol 3350 17 Gram(s) Oral daily  sodium chloride 0.9%. 1000 milliLiter(s) (10 mL/Hr) IV Continuous <Continuous>  vasopressin Infusion 0.04 Unit(s)/Min (6 mL/Hr) IV Continuous <Continuous>    MEDICATIONS  (PRN):        PROBLEM LIST/ ASSESSMENT:  HEALTH ISSUES - PROBLEM Dx:  Severe mitral valve regurgitation  CAD (coronary artery disease)  Atrial fibrillation and flutter  HTN (hypertension)  Morbid obesity  Anxiety  NSTEMI (non-ST elevation myocardial infarction)  Acute systolic congestive heart failure           62 years old male with CHF due to severe mitral regurgitation.  S/P MVR  S/P Cryo maze  S/P CABG (LIMA to LAD).  Hemodynamically stable.  Good oxygenation.  Diuresing well.      My plan includes :  D/C Dobutamine.  Statin Rx.  Amiodarone and Betablocker.  Close hemodynamic, ventilatory and drain monitoring and management  Monitor for arrhythmias and monitor parameters for organ perfusion  Monitor neurologic status  Monitor renal function.  Head of the bed should remain elevated to 45 deg .   Chest PT and IS will be encouraged  Monitor adequacy of oxygenation and ventilation and attempt to wean oxygen  Nutritional goals will be met using po eventually , ensure adequate caloric intake and monitor the same  Stress ulcer and VTE prophylaxis will be achieved    Glycemic control is satisfactory  Electrolytes have been repleted as necessary and wound care has been carried out. Pain control has been achieved.   Aggressive physical therapy and early mobility and ambulation goals will be met   The family was updated about the course and plan  CRITICAL CARE TIME SPENT in evaluation and management, reassessments, review and interpretation of labs and x-rays, ventilator and hemodynamic management, formulating a plan and coordinating care: ___30____ MIN.  Time does not include procedural time.  CTICU ATTENDING     					    Rhys Momin MD

## 2022-11-30 LAB
ALBUMIN SERPL ELPH-MCNC: 3.6 G/DL — SIGNIFICANT CHANGE UP (ref 3.3–5)
ALBUMIN SERPL ELPH-MCNC: 3.8 G/DL — SIGNIFICANT CHANGE UP (ref 3.3–5)
ALP SERPL-CCNC: 47 U/L — SIGNIFICANT CHANGE UP (ref 40–120)
ALP SERPL-CCNC: 49 U/L — SIGNIFICANT CHANGE UP (ref 40–120)
ALT FLD-CCNC: 24 U/L — SIGNIFICANT CHANGE UP (ref 10–45)
ALT FLD-CCNC: 26 U/L — SIGNIFICANT CHANGE UP (ref 10–45)
ANION GAP SERPL CALC-SCNC: 10 MMOL/L — SIGNIFICANT CHANGE UP (ref 5–17)
ANION GAP SERPL CALC-SCNC: 9 MMOL/L — SIGNIFICANT CHANGE UP (ref 5–17)
APTT BLD: 27.8 SEC — SIGNIFICANT CHANGE UP (ref 27.5–35.5)
APTT BLD: 30.6 SEC — SIGNIFICANT CHANGE UP (ref 27.5–35.5)
AST SERPL-CCNC: 57 U/L — HIGH (ref 10–40)
AST SERPL-CCNC: 73 U/L — HIGH (ref 10–40)
BASE EXCESS BLDV CALC-SCNC: 4.9 MMOL/L — HIGH (ref -2–3)
BILIRUB SERPL-MCNC: 0.4 MG/DL — SIGNIFICANT CHANGE UP (ref 0.2–1.2)
BILIRUB SERPL-MCNC: 0.5 MG/DL — SIGNIFICANT CHANGE UP (ref 0.2–1.2)
BUN SERPL-MCNC: 15 MG/DL — SIGNIFICANT CHANGE UP (ref 7–23)
BUN SERPL-MCNC: 17 MG/DL — SIGNIFICANT CHANGE UP (ref 7–23)
CA-I SERPL-SCNC: 1.21 MMOL/L — SIGNIFICANT CHANGE UP (ref 1.15–1.33)
CALCIUM SERPL-MCNC: 8.8 MG/DL — SIGNIFICANT CHANGE UP (ref 8.4–10.5)
CALCIUM SERPL-MCNC: 8.9 MG/DL — SIGNIFICANT CHANGE UP (ref 8.4–10.5)
CHLORIDE SERPL-SCNC: 100 MMOL/L — SIGNIFICANT CHANGE UP (ref 96–108)
CHLORIDE SERPL-SCNC: 99 MMOL/L — SIGNIFICANT CHANGE UP (ref 96–108)
CO2 BLDV-SCNC: 33 MMOL/L — HIGH (ref 22–26)
CO2 SERPL-SCNC: 29 MMOL/L — SIGNIFICANT CHANGE UP (ref 22–31)
CO2 SERPL-SCNC: 30 MMOL/L — SIGNIFICANT CHANGE UP (ref 22–31)
CREAT SERPL-MCNC: 1.32 MG/DL — HIGH (ref 0.5–1.3)
CREAT SERPL-MCNC: 1.34 MG/DL — HIGH (ref 0.5–1.3)
EGFR: 59 ML/MIN/1.73M2 — LOW
EGFR: 60 ML/MIN/1.73M2 — SIGNIFICANT CHANGE UP
GAS PNL BLDA: SIGNIFICANT CHANGE UP
GAS PNL BLDA: SIGNIFICANT CHANGE UP
GAS PNL BLDV: 134 MMOL/L — LOW (ref 136–145)
GAS PNL BLDV: SIGNIFICANT CHANGE UP
GLUCOSE BLDC GLUCOMTR-MCNC: 103 MG/DL — HIGH (ref 70–99)
GLUCOSE BLDC GLUCOMTR-MCNC: 112 MG/DL — HIGH (ref 70–99)
GLUCOSE BLDC GLUCOMTR-MCNC: 113 MG/DL — HIGH (ref 70–99)
GLUCOSE BLDC GLUCOMTR-MCNC: 114 MG/DL — HIGH (ref 70–99)
GLUCOSE BLDC GLUCOMTR-MCNC: 116 MG/DL — HIGH (ref 70–99)
GLUCOSE BLDC GLUCOMTR-MCNC: 136 MG/DL — HIGH (ref 70–99)
GLUCOSE BLDC GLUCOMTR-MCNC: 157 MG/DL — HIGH (ref 70–99)
GLUCOSE BLDC GLUCOMTR-MCNC: 161 MG/DL — HIGH (ref 70–99)
GLUCOSE BLDC GLUCOMTR-MCNC: 96 MG/DL — SIGNIFICANT CHANGE UP (ref 70–99)
GLUCOSE SERPL-MCNC: 104 MG/DL — HIGH (ref 70–99)
GLUCOSE SERPL-MCNC: 111 MG/DL — HIGH (ref 70–99)
HCO3 BLDV-SCNC: 31 MMOL/L — HIGH (ref 22–29)
HCT VFR BLD CALC: 32.7 % — LOW (ref 39–50)
HCT VFR BLD CALC: 33.2 % — LOW (ref 39–50)
HGB BLD-MCNC: 10.9 G/DL — LOW (ref 13–17)
HGB BLD-MCNC: 11.2 G/DL — LOW (ref 13–17)
INR BLD: 1.35 — HIGH (ref 0.88–1.16)
INR BLD: 1.37 — HIGH (ref 0.88–1.16)
LACTATE SERPL-SCNC: 1.1 MMOL/L — SIGNIFICANT CHANGE UP (ref 0.5–2)
LACTATE SERPL-SCNC: 1.6 MMOL/L — SIGNIFICANT CHANGE UP (ref 0.5–2)
MAGNESIUM SERPL-MCNC: 2.2 MG/DL — SIGNIFICANT CHANGE UP (ref 1.6–2.6)
MCHC RBC-ENTMCNC: 33.1 PG — SIGNIFICANT CHANGE UP (ref 27–34)
MCHC RBC-ENTMCNC: 33.3 GM/DL — SIGNIFICANT CHANGE UP (ref 32–36)
MCHC RBC-ENTMCNC: 33.7 GM/DL — SIGNIFICANT CHANGE UP (ref 32–36)
MCHC RBC-ENTMCNC: 33.8 PG — SIGNIFICANT CHANGE UP (ref 27–34)
MCV RBC AUTO: 100.3 FL — HIGH (ref 80–100)
MCV RBC AUTO: 99.4 FL — SIGNIFICANT CHANGE UP (ref 80–100)
NRBC # BLD: 0 /100 WBCS — SIGNIFICANT CHANGE UP (ref 0–0)
NRBC # BLD: 0 /100 WBCS — SIGNIFICANT CHANGE UP (ref 0–0)
PCO2 BLDV: 53 MMHG — SIGNIFICANT CHANGE UP (ref 42–55)
PH BLDV: 7.38 — SIGNIFICANT CHANGE UP (ref 7.32–7.43)
PHOSPHATE SERPL-MCNC: 3.7 MG/DL — SIGNIFICANT CHANGE UP (ref 2.5–4.5)
PHOSPHATE SERPL-MCNC: 4.2 MG/DL — SIGNIFICANT CHANGE UP (ref 2.5–4.5)
PLATELET # BLD AUTO: 104 K/UL — LOW (ref 150–400)
PLATELET # BLD AUTO: 106 K/UL — LOW (ref 150–400)
PO2 BLDV: 38 MMHG — SIGNIFICANT CHANGE UP (ref 25–45)
POTASSIUM BLDV-SCNC: 3.8 MMOL/L — SIGNIFICANT CHANGE UP (ref 3.5–5.1)
POTASSIUM SERPL-MCNC: 3.6 MMOL/L — SIGNIFICANT CHANGE UP (ref 3.5–5.3)
POTASSIUM SERPL-MCNC: 3.9 MMOL/L — SIGNIFICANT CHANGE UP (ref 3.5–5.3)
POTASSIUM SERPL-SCNC: 3.6 MMOL/L — SIGNIFICANT CHANGE UP (ref 3.5–5.3)
POTASSIUM SERPL-SCNC: 3.9 MMOL/L — SIGNIFICANT CHANGE UP (ref 3.5–5.3)
PROT SERPL-MCNC: 5.7 G/DL — LOW (ref 6–8.3)
PROT SERPL-MCNC: 6.4 G/DL — SIGNIFICANT CHANGE UP (ref 6–8.3)
PROTHROM AB SERPL-ACNC: 16.1 SEC — HIGH (ref 10.5–13.4)
PROTHROM AB SERPL-ACNC: 16.4 SEC — HIGH (ref 10.5–13.4)
RBC # BLD: 3.29 M/UL — LOW (ref 4.2–5.8)
RBC # BLD: 3.31 M/UL — LOW (ref 4.2–5.8)
RBC # FLD: 14.5 % — SIGNIFICANT CHANGE UP (ref 10.3–14.5)
RBC # FLD: 14.8 % — HIGH (ref 10.3–14.5)
SAO2 % BLDV: 65.8 % — LOW (ref 67–88)
SODIUM SERPL-SCNC: 138 MMOL/L — SIGNIFICANT CHANGE UP (ref 135–145)
SODIUM SERPL-SCNC: 139 MMOL/L — SIGNIFICANT CHANGE UP (ref 135–145)
WBC # BLD: 10.65 K/UL — HIGH (ref 3.8–10.5)
WBC # BLD: 11.46 K/UL — HIGH (ref 3.8–10.5)
WBC # FLD AUTO: 10.65 K/UL — HIGH (ref 3.8–10.5)
WBC # FLD AUTO: 11.46 K/UL — HIGH (ref 3.8–10.5)

## 2022-11-30 PROCEDURE — 99291 CRITICAL CARE FIRST HOUR: CPT

## 2022-11-30 PROCEDURE — 99233 SBSQ HOSP IP/OBS HIGH 50: CPT

## 2022-11-30 PROCEDURE — 99292 CRITICAL CARE ADDL 30 MIN: CPT

## 2022-11-30 PROCEDURE — 71045 X-RAY EXAM CHEST 1 VIEW: CPT | Mod: 26

## 2022-11-30 RX ORDER — POTASSIUM CHLORIDE 20 MEQ
20 PACKET (EA) ORAL ONCE
Refills: 0 | Status: COMPLETED | OUTPATIENT
Start: 2022-11-30 | End: 2022-11-30

## 2022-11-30 RX ORDER — FUROSEMIDE 40 MG
40 TABLET ORAL DAILY
Refills: 0 | Status: DISCONTINUED | OUTPATIENT
Start: 2022-11-30 | End: 2022-12-01

## 2022-11-30 RX ORDER — AMIODARONE HYDROCHLORIDE 400 MG/1
0.25 TABLET ORAL
Qty: 900 | Refills: 0 | Status: DISCONTINUED | OUTPATIENT
Start: 2022-11-30 | End: 2022-11-30

## 2022-11-30 RX ORDER — FUROSEMIDE 40 MG
40 TABLET ORAL ONCE
Refills: 0 | Status: COMPLETED | OUTPATIENT
Start: 2022-11-30 | End: 2022-11-30

## 2022-11-30 RX ORDER — DEXTROSE 50 % IN WATER 50 %
25 SYRINGE (ML) INTRAVENOUS ONCE
Refills: 0 | Status: DISCONTINUED | OUTPATIENT
Start: 2022-11-30 | End: 2022-12-01

## 2022-11-30 RX ORDER — SPIRONOLACTONE 25 MG/1
25 TABLET, FILM COATED ORAL DAILY
Refills: 0 | Status: DISCONTINUED | OUTPATIENT
Start: 2022-11-30 | End: 2022-12-04

## 2022-11-30 RX ORDER — ONDANSETRON 8 MG/1
4 TABLET, FILM COATED ORAL ONCE
Refills: 0 | Status: COMPLETED | OUTPATIENT
Start: 2022-11-30 | End: 2022-11-30

## 2022-11-30 RX ORDER — DEXTROSE 50 % IN WATER 50 %
15 SYRINGE (ML) INTRAVENOUS ONCE
Refills: 0 | Status: DISCONTINUED | OUTPATIENT
Start: 2022-11-30 | End: 2022-12-01

## 2022-11-30 RX ORDER — INSULIN LISPRO 100/ML
VIAL (ML) SUBCUTANEOUS
Refills: 0 | Status: DISCONTINUED | OUTPATIENT
Start: 2022-11-30 | End: 2022-12-01

## 2022-11-30 RX ORDER — OXYCODONE HYDROCHLORIDE 5 MG/1
15 TABLET ORAL EVERY 4 HOURS
Refills: 0 | Status: DISCONTINUED | OUTPATIENT
Start: 2022-11-30 | End: 2022-12-06

## 2022-11-30 RX ORDER — ACETAMINOPHEN 500 MG
1000 TABLET ORAL ONCE
Refills: 0 | Status: COMPLETED | OUTPATIENT
Start: 2022-11-30 | End: 2022-11-30

## 2022-11-30 RX ORDER — GLUCAGON INJECTION, SOLUTION 0.5 MG/.1ML
1 INJECTION, SOLUTION SUBCUTANEOUS ONCE
Refills: 0 | Status: DISCONTINUED | OUTPATIENT
Start: 2022-11-30 | End: 2022-12-01

## 2022-11-30 RX ORDER — SODIUM CHLORIDE 9 MG/ML
1000 INJECTION, SOLUTION INTRAVENOUS
Refills: 0 | Status: DISCONTINUED | OUTPATIENT
Start: 2022-11-30 | End: 2022-12-01

## 2022-11-30 RX ORDER — ALPRAZOLAM 0.25 MG
0.5 TABLET ORAL AT BEDTIME
Refills: 0 | Status: DISCONTINUED | OUTPATIENT
Start: 2022-11-30 | End: 2022-12-07

## 2022-11-30 RX ORDER — DEXTROSE 50 % IN WATER 50 %
12.5 SYRINGE (ML) INTRAVENOUS ONCE
Refills: 0 | Status: DISCONTINUED | OUTPATIENT
Start: 2022-11-30 | End: 2022-12-01

## 2022-11-30 RX ORDER — AMIODARONE HYDROCHLORIDE 400 MG/1
0.5 TABLET ORAL
Qty: 900 | Refills: 0 | Status: DISCONTINUED | OUTPATIENT
Start: 2022-11-30 | End: 2022-12-01

## 2022-11-30 RX ORDER — ACETAMINOPHEN 500 MG
1000 TABLET ORAL ONCE
Refills: 0 | Status: COMPLETED | OUTPATIENT
Start: 2022-11-30 | End: 2022-12-01

## 2022-11-30 RX ORDER — CHLOROTHIAZIDE 500 MG
500 TABLET ORAL ONCE
Refills: 0 | Status: COMPLETED | OUTPATIENT
Start: 2022-11-30 | End: 2022-11-30

## 2022-11-30 RX ADMIN — Medication 0.5 MILLIGRAM(S): at 21:20

## 2022-11-30 RX ADMIN — HEPARIN SODIUM 7500 UNIT(S): 5000 INJECTION INTRAVENOUS; SUBCUTANEOUS at 05:07

## 2022-11-30 RX ADMIN — CHLORHEXIDINE GLUCONATE 1 APPLICATION(S): 213 SOLUTION TOPICAL at 11:43

## 2022-11-30 RX ADMIN — HEPARIN SODIUM 7500 UNIT(S): 5000 INJECTION INTRAVENOUS; SUBCUTANEOUS at 14:07

## 2022-11-30 RX ADMIN — Medication 1000 MILLIGRAM(S): at 07:14

## 2022-11-30 RX ADMIN — Medication 81 MILLIGRAM(S): at 11:22

## 2022-11-30 RX ADMIN — SPIRONOLACTONE 25 MILLIGRAM(S): 25 TABLET, FILM COATED ORAL at 16:30

## 2022-11-30 RX ADMIN — Medication 20 MILLIEQUIVALENT(S): at 14:42

## 2022-11-30 RX ADMIN — AMIODARONE HYDROCHLORIDE 16.7 MG/MIN: 400 TABLET ORAL at 12:19

## 2022-11-30 RX ADMIN — Medication 400 MILLIGRAM(S): at 06:56

## 2022-11-30 RX ADMIN — Medication 2: at 21:39

## 2022-11-30 RX ADMIN — HEPARIN SODIUM 7500 UNIT(S): 5000 INJECTION INTRAVENOUS; SUBCUTANEOUS at 21:32

## 2022-11-30 RX ADMIN — ONDANSETRON 4 MILLIGRAM(S): 8 TABLET, FILM COATED ORAL at 11:41

## 2022-11-30 RX ADMIN — Medication 2000 MILLIGRAM(S): at 11:24

## 2022-11-30 RX ADMIN — Medication 100 MILLIEQUIVALENT(S): at 03:11

## 2022-11-30 RX ADMIN — VASOPRESSIN 6 UNIT(S)/MIN: 20 INJECTION INTRAVENOUS at 11:19

## 2022-11-30 RX ADMIN — ATORVASTATIN CALCIUM 80 MILLIGRAM(S): 80 TABLET, FILM COATED ORAL at 21:27

## 2022-11-30 RX ADMIN — Medication 1000 MILLIGRAM(S): at 02:30

## 2022-11-30 RX ADMIN — Medication 400 MILLIGRAM(S): at 01:46

## 2022-11-30 RX ADMIN — Medication 40 MILLIGRAM(S): at 14:39

## 2022-11-30 RX ADMIN — Medication 100 MILLIGRAM(S): at 23:08

## 2022-11-30 RX ADMIN — Medication 40 MILLIGRAM(S): at 03:51

## 2022-11-30 RX ADMIN — PANTOPRAZOLE SODIUM 40 MILLIGRAM(S): 20 TABLET, DELAYED RELEASE ORAL at 11:24

## 2022-11-30 RX ADMIN — Medication 40 MILLIGRAM(S): at 21:20

## 2022-11-30 RX ADMIN — Medication 2000 MILLIGRAM(S): at 03:01

## 2022-11-30 RX ADMIN — INSULIN HUMAN 1 UNIT(S)/HR: 100 INJECTION, SOLUTION SUBCUTANEOUS at 05:30

## 2022-11-30 RX ADMIN — POLYETHYLENE GLYCOL 3350 17 GRAM(S): 17 POWDER, FOR SOLUTION ORAL at 11:22

## 2022-11-30 NOTE — PHYSICAL THERAPY INITIAL EVALUATION ADULT - ADDITIONAL COMMENTS
Patient reports he lives with his wife in apartment with 4 BLANK and 1 flight. PTA patient was independent with all mobility and ADLs

## 2022-11-30 NOTE — PHYSICAL THERAPY INITIAL EVALUATION ADULT - GENERAL OBSERVATIONS, REHAB EVAL
Spoke with RN Kianna who cleared for OOB. Patient received sitting in chair in NAD with +ECG +central line +6L O2 NC +CT to suction +kaila x 1 +TPM +petersen +SCD x 2

## 2022-11-30 NOTE — PROGRESS NOTE ADULT - SUBJECTIVE AND OBJECTIVE BOX
CTICU  CRITICAL  CARE  attending     Hand off received 					   Pertinent clinical, laboratory, radiographic, hemodynamic, echocardiographic, respiratory data, microbiologic data and chart were reviewed and analyzed frequently throughout the course of the day  Patient seen and examined with CTS/ SH attending at bedside  Pt is a 62yr old male with PMH anxiety, Tx from [place] hx for rapid afib sp cardiac cath showing 2 vessel CAD, for surgical mgmt.   Patient underwent Mitral valve repair with ring, CABG x 1 (LIMA-LAD), biatrial cryomaze, CHANDRIKA clip with Dr. Reilly 11/28. Extubated to BIPAP 11/29, remains on dobutamine 5.     FAMILY HISTORY:  No pertinent family history in first degree relatives  PAST MEDICAL & SURGICAL HISTORY:  Morbid obesity  Rapid palpitations  Anxiety  No significant past surgical history        Patient is a 62y old  Male who presents with a chief complaint of CAD.      14 system review was unremarkable    Vital signs, hemodynamic and respiratory parameters were reviewed from the bedside nursing flowsheet.  ICU Vital Signs Last 24 Hrs  T(C): 35.6 (30 Nov 2022 09:09), Max: 36.4 (30 Nov 2022 01:18)  T(F): 96 (30 Nov 2022 09:09), Max: 97.6 (30 Nov 2022 01:18)  HR: 79 (30 Nov 2022 12:00) (59 - 132)  BP: --  BP(mean): --  ABP: 115/48 (30 Nov 2022 12:00) (94/52 - 141/71)  ABP(mean): 70 (30 Nov 2022 12:00) (66 - 92)  RR: 29 (30 Nov 2022 12:00) (8 - 29)  SpO2: 92% (30 Nov 2022 12:00) (90% - 99%)    O2 Parameters below as of 30 Nov 2022 12:00  Patient On (Oxygen Delivery Method): nasal cannula w/ humidification  O2 Flow (L/min): 6        Adult Advanced Hemodynamics Last 24 Hrs  CVP(mm Hg): 17 (30 Nov 2022 12:00) (12 - 55)  CVP(cm H2O): --  CO: --  CI: --  PA: --  PA(mean): --  PCWP: --  SVR: --  SVRI: --  PVR: --  PVRI: --, ABG - ( 30 Nov 2022 11:55 )  pH, Arterial: 7.41  pH, Blood: x     /  pCO2: 41    /  pO2: 70    / HCO3: 26    / Base Excess: 1.2   /  SaO2: 96.8              Mode: AC/ CMV (Assist Control/ Continuous Mandatory Ventilation)  RR (machine): 15  TV (machine): 600  FiO2: 60  PEEP: 8  ITime: 1  MAP: 13  PIP: 26    Intake and output was reviewed and the fluid balance was calculated  Daily     Daily   I&O's Summary    29 Nov 2022 07:01  -  30 Nov 2022 07:00  --------------------------------------------------------  IN: 3137.8 mL / OUT: 4350 mL / NET: -1212.2 mL    30 Nov 2022 07:01  -  30 Nov 2022 12:17  --------------------------------------------------------  IN: 120.8 mL / OUT: 215 mL / NET: -94.2 mL        All lines and drain sites were assessed  Glycemic trend was reviewed      POCT Blood Glucose.: 114 mg/dL (30 Nov 2022 09:20)      Neuro: alert and oriented  HEENT: mmm  Heart: s1 s2  Lungs: clear bl  Abdomen: soft ntnd  Extremities: 2+dp    Lines:  L radial arterial line 11/28  RIJ TLC 11/28    labs  CBC Full  -  ( 30 Nov 2022 02:15 )  WBC Count : 11.46 K/uL  RBC Count : 3.29 M/uL  Hemoglobin : 10.9 g/dL  Hematocrit : 32.7 %  Platelet Count - Automated : 104 K/uL  Mean Cell Volume : 99.4 fl  Mean Cell Hemoglobin : 33.1 pg  Mean Cell Hemoglobin Concentration : 33.3 gm/dL  Auto Neutrophil # : x  Auto Lymphocyte # : x  Auto Monocyte # : x  Auto Eosinophil # : x  Auto Basophil # : x  Auto Neutrophil % : x  Auto Lymphocyte % : x  Auto Monocyte % : x  Auto Eosinophil % : x  Auto Basophil % : x    11-30    139  |  100  |  15  ----------------------------<  104<H>  3.6   |  30  |  1.34<H>    Ca    8.9      30 Nov 2022 02:15  Phos  4.2     11-30  Mg     2.2     11-30    TPro  5.7<L>  /  Alb  3.6  /  TBili  0.4  /  DBili  x   /  AST  73<H>  /  ALT  26  /  AlkPhos  47  11-30    PT/INR - ( 30 Nov 2022 02:15 )   PT: 16.1 sec;   INR: 1.35          PTT - ( 30 Nov 2022 02:15 )  PTT:30.6 sec  The current medications were reviewed   MEDICATIONS  (STANDING):  aMIOdarone Infusion 0.5 mG/Min (16.7 mL/Hr) IV Continuous <Continuous>  aspirin enteric coated 81 milliGRAM(s) Oral daily  atorvastatin 80 milliGRAM(s) Oral at bedtime  chlorhexidine 2% Cloths 1 Application(s) Topical daily  dextrose 50% Injectable 50 milliLiter(s) IV Push every 15 minutes  dextrose 50% Injectable 25 milliLiter(s) IV Push every 15 minutes  DOBUTamine Infusion 3 MICROgram(s)/kG/Min (11.2 mL/Hr) IV Continuous <Continuous>  furosemide    Tablet 40 milliGRAM(s) Oral daily  heparin   Injectable 7500 Unit(s) SubCutaneous every 8 hours  insulin regular Infusion 1 Unit(s)/Hr (1 mL/Hr) IV Continuous <Continuous>  norepinephrine Infusion 0.05 MICROgram(s)/kG/Min (11.7 mL/Hr) IV Continuous <Continuous>  pantoprazole    Tablet 40 milliGRAM(s) Oral daily  polyethylene glycol 3350 17 Gram(s) Oral daily  sodium chloride 0.9%. 1000 milliLiter(s) (10 mL/Hr) IV Continuous <Continuous>  spironolactone 25 milliGRAM(s) Oral daily  vasopressin Infusion 0.04 Unit(s)/Min (6 mL/Hr) IV Continuous <Continuous>    MEDICATIONS  (PRN):      Assessment/Plan:  Pt is a 62yr old male with PMH anxiety, Tx from [place] hx for rapid afib sp cardiac cath showing 2 vessel CAD, for surgical mgmt.     POD 2 Mitral valve repair with ring, CABG x 1 (LIMA-LAD), biatrial cryomaze, CHANDRIKA clip (Reilly, 11/28)  Cardiogenic shock on dobutamine  Vasogenic shock on vasopressin-wean as tolerated  MUKUND-monitor  Acute post operative anemia-trend H/H  Lasix daily  Arrythmia on amio  Diet as tolerated  Replete lytes prn  Monitor CT output  GI/DVT PPX  Bowel Regimen  Pain control  OOB with PT  Close hemodynamic, ventilatory and drain monitoring and management per post op routine  Monitor for arrhythmias and monitor parameters for organ perfusion  Beta blockade not administered due to hemodynamic instability and bradycardia  Monitor neurologic status  Head of the bed should remain elevated to 45 deg   Chest PT and IS will be encouraged  Monitor adequacy of oxygenation and ventilation and attempt to wean oxygen  Antibiotic regimen will be tailored to the clinical, laboratory and microbiologic data  Nutritional goals will be met using po eventually, ensure adequate caloric intake and monitor the same  Stress ulcer and VTE prophylaxis will be achieved    Glycemic control is satisfactory  Electrolytes have been repleted as necessary and wound care has been carried out   Pain control has been achieved.   Aggressive physical therapy and early mobility and ambulation goals will be met   The family was updated about the course and plan.    CRITICAL CARE TIME personally provided by me  in evaluation and management, reassessments, review and interpretation of labs and x-rays, ventilator and hemodynamic management, formulating a plan and coordinating care: ___90____ MIN.  Time does not include procedural time.    CTICU ATTENDING     					  Roxane Daley MD

## 2022-11-30 NOTE — PROGRESS NOTE ADULT - SUBJECTIVE AND OBJECTIVE BOX
CTICU  CRITICAL  CARE  attending     Hand off received 					   Pertinent clinical, laboratory, radiographic, hemodynamic, echocardiographic, respiratory data, microbiologic data and chart were reviewed and analyzed frequently throughout the course of the day and night      62 years old male with HTN,  anxiety, morbid obesity.  recently he had increasing SOB, Nausea, vomiting, night sweats and  palpitations for the last few days which progressively worsened.   He was brought in by EMS to [facility] found to be in rapid atrial fibrillation -200, HTN with expiratory wheezing.   He was treated with IV Cardizem and nebulized albuterol which decreased to HR and wheezing.   IV heparin drip was started at that time and given lasix after CXR showed pulmonary edema.   Troponin found to be elevated diagnosed with NSTEMI.   Cardiac cath showed 2 vessel CAD mLAD 60% mCX 50% with EF 40% and medical management recommended.   OLEG showed severe central MR and was transferred to [facility] under Dr. Reilly for surgical evaluation and management.    S/P MVR  S/P CABG x 1 (LIMA to LAD).      HEALTH ISSUES - PROBLEM Dx:  Severe mitral valve regurgitation  CAD (coronary artery disease)  Atrial fibrillation and flutter  HTN (hypertension)  Morbid obesity  Anxiety  NSTEMI (non-ST elevation myocardial infarction)  Acute systolic congestive heart failure        FAMILY HISTORY:  No pertinent family history in first degree relatives    PAST MEDICAL & SURGICAL HISTORY:  Morbid obesity  Rapid palpitations  Anxiety  No significant past surgical history        14 system review was unremarkable    Vital signs, hemodynamic and respiratory parameters were reviewed from the bedside nursing flow sheet.  ICU Vital Signs Last 24 Hrs  T(C): 36.3 (30 Nov 2022 17:28), Max: 36.4 (30 Nov 2022 01:18)  T(F): 97.4 (30 Nov 2022 17:28), Max: 97.6 (30 Nov 2022 01:18)  HR: 79 (30 Nov 2022 20:00) (73 - 123)  BP: 133/70 (30 Nov 2022 15:10) (133/70 - 133/70)  BP(mean): 96 (30 Nov 2022 15:10) (96 - 96)  ABP: 137/63 (30 Nov 2022 20:00) (94/52 - 171/69)  ABP(mean): 81 (30 Nov 2022 20:00) (66 - 94)  RR: 23 (30 Nov 2022 20:00) (13 - 30)  SpO2: 93% (30 Nov 2022 20:00) (89% - 99%)    O2 Parameters below as of 30 Nov 2022 20:00  Patient On (Oxygen Delivery Method): nasal cannula w/ humidification  O2 Flow (L/min): 6        Adult Advanced Hemodynamics Last 24 Hrs  CVP(mm Hg): 17 (30 Nov 2022 20:00) (13 - 55)  CVP(cm H2O): --  CO: --  CI: --  PA: --  PA(mean): --  PCWP: --  SVR: --  SVRI: --  PVR: --  PVRI: --, ABG - ( 30 Nov 2022 11:55 )  pH, Arterial: 7.41  pH, Blood: x     /  pCO2: 41    /  pO2: 70    / HCO3: 26    / Base Excess: 1.2   /  SaO2: 96.8                Intake and output was reviewed and the fluid balance was calculated  Daily     Daily   I&O's Summary    29 Nov 2022 07:01  -  30 Nov 2022 07:00  --------------------------------------------------------  IN: 3137.8 mL / OUT: 4350 mL / NET: -1212.2 mL    30 Nov 2022 07:01  -  30 Nov 2022 21:00  --------------------------------------------------------  IN: 780.9 mL / OUT: 590 mL / NET: 190.9 mL        All lines and drain sites were assessed    Neuro: No focal motor deficit.  Neck: No JVD.  CVS: S1, S2, No S3.  Lungs: Good air entry bilaterally.  Abd: Soft. No tenderness. + Bowel sounds.  Vascular: + DP/PT.  Extremities: No edema.  Lymphatic: Normal.  Skin: No abnormalities.      labs  CBC Full  -  ( 30 Nov 2022 11:55 )  WBC Count : 10.65 K/uL  RBC Count : 3.31 M/uL  Hemoglobin : 11.2 g/dL  Hematocrit : 33.2 %  Platelet Count - Automated : 106 K/uL  Mean Cell Volume : 100.3 fl  Mean Cell Hemoglobin : 33.8 pg  Mean Cell Hemoglobin Concentration : 33.7 gm/dL  Auto Neutrophil # : x  Auto Lymphocyte # : x  Auto Monocyte # : x  Auto Eosinophil # : x  Auto Basophil # : x  Auto Neutrophil % : x  Auto Lymphocyte % : x  Auto Monocyte % : x  Auto Eosinophil % : x  Auto Basophil % : x    11-30    138  |  99  |  17  ----------------------------<  111<H>  3.9   |  29  |  1.32<H>    Ca    8.8      30 Nov 2022 11:55  Phos  3.7     11-30  Mg     2.2     11-30    TPro  6.4  /  Alb  3.8  /  TBili  0.5  /  DBili  x   /  AST  57<H>  /  ALT  24  /  AlkPhos  49  11-30    PT/INR - ( 30 Nov 2022 14:17 )   PT: 16.4 sec;   INR: 1.37          PTT - ( 30 Nov 2022 14:17 )  PTT:27.8 sec  The current medications were reviewed   MEDICATIONS  (STANDING):  aMIOdarone Infusion 0.5 mG/Min (16.7 mL/Hr) IV Continuous <Continuous>  aspirin enteric coated 81 milliGRAM(s) Oral daily  atorvastatin 80 milliGRAM(s) Oral at bedtime  chlorhexidine 2% Cloths 1 Application(s) Topical daily  dextrose 5%. 1000 milliLiter(s) (50 mL/Hr) IV Continuous <Continuous>  dextrose 5%. 1000 milliLiter(s) (100 mL/Hr) IV Continuous <Continuous>  dextrose 50% Injectable 25 Gram(s) IV Push once  dextrose 50% Injectable 25 Gram(s) IV Push once  dextrose 50% Injectable 12.5 Gram(s) IV Push once  DOBUTamine Infusion 3 MICROgram(s)/kG/Min (11.2 mL/Hr) IV Continuous <Continuous>  furosemide    Tablet 40 milliGRAM(s) Oral daily  glucagon  Injectable 1 milliGRAM(s) IntraMuscular once  heparin   Injectable 7500 Unit(s) SubCutaneous every 8 hours  insulin lispro (ADMELOG) corrective regimen sliding scale   SubCutaneous Before meals and at bedtime  norepinephrine Infusion 0.05 MICROgram(s)/kG/Min (11.7 mL/Hr) IV Continuous <Continuous>  pantoprazole    Tablet 40 milliGRAM(s) Oral daily  polyethylene glycol 3350 17 Gram(s) Oral daily  sodium chloride 0.9%. 1000 milliLiter(s) (10 mL/Hr) IV Continuous <Continuous>  spironolactone 25 milliGRAM(s) Oral daily  vasopressin Infusion 0.04 Unit(s)/Min (6 mL/Hr) IV Continuous <Continuous>    MEDICATIONS  (PRN):  dextrose Oral Gel 15 Gram(s) Oral once PRN Blood Glucose LESS THAN 70 milliGRAM(s)/deciliter        PROBLEM LIST/ ASSESSMENT:  HEALTH ISSUES - PROBLEM Dx:  Severe mitral valve regurgitation  CAD (coronary artery disease)  Atrial fibrillation and flutter  HTN (hypertension)  Morbid obesity  Anxiety  NSTEMI (non-ST elevation myocardial infarction)  Acute systolic congestive heart failure        62 years old male with CHF due to severe mitral regurgitation.  S/P MVR  S/P Cryo maze  S/P CABG (LIMA to LAD).  Hemodynamically stable.  Good oxygenation.  Borderline urine output.      My plan includes :  Gentle Diuresis  Afterload Reduction.  Statin and Betablocker.  Close hemodynamic, ventilatory and drain monitoring and management  Monitor for arrhythmias and monitor parameters for organ perfusion  Monitor neurologic status  Monitor renal function.  Head of the bed should remain elevated to 45 deg .   Chest PT and IS will be encouraged  Monitor adequacy of oxygenation and ventilation and attempt to wean oxygen  Nutritional goals will be met using po eventually , ensure adequate caloric intake and monitor the same  Stress ulcer and VTE prophylaxis will be achieved    Glycemic control is satisfactory  Electrolytes have been repleted as necessary and wound care has been carried out. Pain control has been achieved.   Aggressive physical therapy and early mobility and ambulation goals will be met   The family was updated about the course and plan  CRITICAL CARE TIME SPENT in evaluation and management, reassessments, review and interpretation of labs and x-rays, ventilator and hemodynamic management, formulating a plan and coordinating care: ___30____ MIN.  Time does not include procedural time.  CTICU ATTENDING     					    Rhys Momin MD

## 2022-11-30 NOTE — PHYSICAL THERAPY INITIAL EVALUATION ADULT - PERTINENT HX OF CURRENT PROBLEM, REHAB EVAL
Patient is a 62 year old male who c/o SOB, N&V, night sweats and  palpitations for the last few days which progressively worsened. PT was brought in by EMS to [facility] found to be in rapid atrial fibrillation -200, HTN with expiratory wheezing. Given Cardizem and albuterol which decreased to HR and wheezing. Placed on heparin drip at that time and given lasix after CXR showed pulmonary edema. Troponin found to be elevated diagnosed with NSTEMI. Subsequent cardiac cath showed 2 vessel CAD mLAD 60% mCX 50% with EF 40% and medical management recommended. Subsequent OLEG showed severe central MR

## 2022-12-01 LAB
ALBUMIN SERPL ELPH-MCNC: 3.7 G/DL — SIGNIFICANT CHANGE UP (ref 3.3–5)
ALBUMIN SERPL ELPH-MCNC: 3.8 G/DL — SIGNIFICANT CHANGE UP (ref 3.3–5)
ALBUMIN SERPL ELPH-MCNC: 4 G/DL — SIGNIFICANT CHANGE UP (ref 3.3–5)
ALP SERPL-CCNC: 58 U/L — SIGNIFICANT CHANGE UP (ref 40–120)
ALP SERPL-CCNC: 62 U/L — SIGNIFICANT CHANGE UP (ref 40–120)
ALP SERPL-CCNC: 70 U/L — SIGNIFICANT CHANGE UP (ref 40–120)
ALT FLD-CCNC: 22 U/L — SIGNIFICANT CHANGE UP (ref 10–45)
ALT FLD-CCNC: 24 U/L — SIGNIFICANT CHANGE UP (ref 10–45)
ALT FLD-CCNC: 25 U/L — SIGNIFICANT CHANGE UP (ref 10–45)
ANION GAP SERPL CALC-SCNC: 11 MMOL/L — SIGNIFICANT CHANGE UP (ref 5–17)
ANION GAP SERPL CALC-SCNC: 12 MMOL/L — SIGNIFICANT CHANGE UP (ref 5–17)
ANION GAP SERPL CALC-SCNC: 7 MMOL/L — SIGNIFICANT CHANGE UP (ref 5–17)
APTT BLD: 28.6 SEC — SIGNIFICANT CHANGE UP (ref 27.5–35.5)
APTT BLD: 29.8 SEC — SIGNIFICANT CHANGE UP (ref 27.5–35.5)
APTT BLD: 32.5 SEC — SIGNIFICANT CHANGE UP (ref 27.5–35.5)
AST SERPL-CCNC: 47 U/L — HIGH (ref 10–40)
AST SERPL-CCNC: 53 U/L — HIGH (ref 10–40)
AST SERPL-CCNC: 59 U/L — HIGH (ref 10–40)
BILIRUB SERPL-MCNC: 0.7 MG/DL — SIGNIFICANT CHANGE UP (ref 0.2–1.2)
BILIRUB SERPL-MCNC: 0.7 MG/DL — SIGNIFICANT CHANGE UP (ref 0.2–1.2)
BILIRUB SERPL-MCNC: 0.8 MG/DL — SIGNIFICANT CHANGE UP (ref 0.2–1.2)
BUN SERPL-MCNC: 17 MG/DL — SIGNIFICANT CHANGE UP (ref 7–23)
BUN SERPL-MCNC: 18 MG/DL — SIGNIFICANT CHANGE UP (ref 7–23)
BUN SERPL-MCNC: 19 MG/DL — SIGNIFICANT CHANGE UP (ref 7–23)
CALCIUM SERPL-MCNC: 8.7 MG/DL — SIGNIFICANT CHANGE UP (ref 8.4–10.5)
CALCIUM SERPL-MCNC: 8.9 MG/DL — SIGNIFICANT CHANGE UP (ref 8.4–10.5)
CALCIUM SERPL-MCNC: 9.4 MG/DL — SIGNIFICANT CHANGE UP (ref 8.4–10.5)
CHLORIDE SERPL-SCNC: 89 MMOL/L — LOW (ref 96–108)
CHLORIDE SERPL-SCNC: 92 MMOL/L — LOW (ref 96–108)
CHLORIDE SERPL-SCNC: 93 MMOL/L — LOW (ref 96–108)
CO2 SERPL-SCNC: 28 MMOL/L — SIGNIFICANT CHANGE UP (ref 22–31)
CO2 SERPL-SCNC: 28 MMOL/L — SIGNIFICANT CHANGE UP (ref 22–31)
CO2 SERPL-SCNC: 31 MMOL/L — SIGNIFICANT CHANGE UP (ref 22–31)
CREAT SERPL-MCNC: 1.24 MG/DL — SIGNIFICANT CHANGE UP (ref 0.5–1.3)
CREAT SERPL-MCNC: 1.29 MG/DL — SIGNIFICANT CHANGE UP (ref 0.5–1.3)
CREAT SERPL-MCNC: 1.31 MG/DL — HIGH (ref 0.5–1.3)
EGFR: 61 ML/MIN/1.73M2 — SIGNIFICANT CHANGE UP
EGFR: 62 ML/MIN/1.73M2 — SIGNIFICANT CHANGE UP
EGFR: 65 ML/MIN/1.73M2 — SIGNIFICANT CHANGE UP
GAS PNL BLDA: SIGNIFICANT CHANGE UP
GLUCOSE BLDC GLUCOMTR-MCNC: 138 MG/DL — HIGH (ref 70–99)
GLUCOSE SERPL-MCNC: 134 MG/DL — HIGH (ref 70–99)
GLUCOSE SERPL-MCNC: 165 MG/DL — HIGH (ref 70–99)
GLUCOSE SERPL-MCNC: 168 MG/DL — HIGH (ref 70–99)
HCT VFR BLD CALC: 31.7 % — LOW (ref 39–50)
HCT VFR BLD CALC: 32.1 % — LOW (ref 39–50)
HCT VFR BLD CALC: 33.9 % — LOW (ref 39–50)
HGB BLD-MCNC: 10.3 G/DL — LOW (ref 13–17)
HGB BLD-MCNC: 10.7 G/DL — LOW (ref 13–17)
HGB BLD-MCNC: 11.2 G/DL — LOW (ref 13–17)
INR BLD: 1.21 — HIGH (ref 0.88–1.16)
INR BLD: 1.25 — HIGH (ref 0.88–1.16)
INR BLD: 1.29 — HIGH (ref 0.88–1.16)
MAGNESIUM SERPL-MCNC: 2.1 MG/DL — SIGNIFICANT CHANGE UP (ref 1.6–2.6)
MAGNESIUM SERPL-MCNC: 2.2 MG/DL — SIGNIFICANT CHANGE UP (ref 1.6–2.6)
MAGNESIUM SERPL-MCNC: 2.2 MG/DL — SIGNIFICANT CHANGE UP (ref 1.6–2.6)
MCHC RBC-ENTMCNC: 32.5 GM/DL — SIGNIFICANT CHANGE UP (ref 32–36)
MCHC RBC-ENTMCNC: 32.9 PG — SIGNIFICANT CHANGE UP (ref 27–34)
MCHC RBC-ENTMCNC: 33 GM/DL — SIGNIFICANT CHANGE UP (ref 32–36)
MCHC RBC-ENTMCNC: 33 PG — SIGNIFICANT CHANGE UP (ref 27–34)
MCHC RBC-ENTMCNC: 33 PG — SIGNIFICANT CHANGE UP (ref 27–34)
MCHC RBC-ENTMCNC: 33.3 GM/DL — SIGNIFICANT CHANGE UP (ref 32–36)
MCV RBC AUTO: 100 FL — SIGNIFICANT CHANGE UP (ref 80–100)
MCV RBC AUTO: 101.3 FL — HIGH (ref 80–100)
MCV RBC AUTO: 99.1 FL — SIGNIFICANT CHANGE UP (ref 80–100)
NRBC # BLD: 0 /100 WBCS — SIGNIFICANT CHANGE UP (ref 0–0)
PHOSPHATE SERPL-MCNC: 2.7 MG/DL — SIGNIFICANT CHANGE UP (ref 2.5–4.5)
PHOSPHATE SERPL-MCNC: 3.5 MG/DL — SIGNIFICANT CHANGE UP (ref 2.5–4.5)
PHOSPHATE SERPL-MCNC: 3.7 MG/DL — SIGNIFICANT CHANGE UP (ref 2.5–4.5)
PLATELET # BLD AUTO: 105 K/UL — LOW (ref 150–400)
PLATELET # BLD AUTO: 118 K/UL — LOW (ref 150–400)
PLATELET # BLD AUTO: 148 K/UL — LOW (ref 150–400)
POTASSIUM SERPL-MCNC: 4 MMOL/L — SIGNIFICANT CHANGE UP (ref 3.5–5.3)
POTASSIUM SERPL-MCNC: 4.1 MMOL/L — SIGNIFICANT CHANGE UP (ref 3.5–5.3)
POTASSIUM SERPL-MCNC: 4.1 MMOL/L — SIGNIFICANT CHANGE UP (ref 3.5–5.3)
POTASSIUM SERPL-SCNC: 4 MMOL/L — SIGNIFICANT CHANGE UP (ref 3.5–5.3)
POTASSIUM SERPL-SCNC: 4.1 MMOL/L — SIGNIFICANT CHANGE UP (ref 3.5–5.3)
POTASSIUM SERPL-SCNC: 4.1 MMOL/L — SIGNIFICANT CHANGE UP (ref 3.5–5.3)
PROT SERPL-MCNC: 6.1 G/DL — SIGNIFICANT CHANGE UP (ref 6–8.3)
PROT SERPL-MCNC: 6.6 G/DL — SIGNIFICANT CHANGE UP (ref 6–8.3)
PROT SERPL-MCNC: 7 G/DL — SIGNIFICANT CHANGE UP (ref 6–8.3)
PROTHROM AB SERPL-ACNC: 14.4 SEC — HIGH (ref 10.5–13.4)
PROTHROM AB SERPL-ACNC: 14.9 SEC — HIGH (ref 10.5–13.4)
PROTHROM AB SERPL-ACNC: 15.4 SEC — HIGH (ref 10.5–13.4)
RBC # BLD: 3.13 M/UL — LOW (ref 4.2–5.8)
RBC # BLD: 3.24 M/UL — LOW (ref 4.2–5.8)
RBC # BLD: 3.39 M/UL — LOW (ref 4.2–5.8)
RBC # FLD: 14.2 % — SIGNIFICANT CHANGE UP (ref 10.3–14.5)
RBC # FLD: 14.5 % — SIGNIFICANT CHANGE UP (ref 10.3–14.5)
RBC # FLD: 14.5 % — SIGNIFICANT CHANGE UP (ref 10.3–14.5)
SODIUM SERPL-SCNC: 129 MMOL/L — LOW (ref 135–145)
SODIUM SERPL-SCNC: 131 MMOL/L — LOW (ref 135–145)
SODIUM SERPL-SCNC: 131 MMOL/L — LOW (ref 135–145)
WBC # BLD: 10.72 K/UL — HIGH (ref 3.8–10.5)
WBC # BLD: 13.51 K/UL — HIGH (ref 3.8–10.5)
WBC # BLD: 9.57 K/UL — SIGNIFICANT CHANGE UP (ref 3.8–10.5)
WBC # FLD AUTO: 10.72 K/UL — HIGH (ref 3.8–10.5)
WBC # FLD AUTO: 13.51 K/UL — HIGH (ref 3.8–10.5)
WBC # FLD AUTO: 9.57 K/UL — SIGNIFICANT CHANGE UP (ref 3.8–10.5)

## 2022-12-01 PROCEDURE — 71045 X-RAY EXAM CHEST 1 VIEW: CPT | Mod: 26

## 2022-12-01 PROCEDURE — 99233 SBSQ HOSP IP/OBS HIGH 50: CPT

## 2022-12-01 PROCEDURE — 99292 CRITICAL CARE ADDL 30 MIN: CPT

## 2022-12-01 PROCEDURE — 99291 CRITICAL CARE FIRST HOUR: CPT

## 2022-12-01 RX ORDER — AMIODARONE HYDROCHLORIDE 400 MG/1
400 TABLET ORAL EVERY 8 HOURS
Refills: 0 | Status: DISCONTINUED | OUTPATIENT
Start: 2022-12-01 | End: 2022-12-03

## 2022-12-01 RX ORDER — FUROSEMIDE 40 MG
40 TABLET ORAL EVERY 12 HOURS
Refills: 0 | Status: DISCONTINUED | OUTPATIENT
Start: 2022-12-01 | End: 2022-12-04

## 2022-12-01 RX ORDER — AMIODARONE HYDROCHLORIDE 400 MG/1
TABLET ORAL
Refills: 0 | Status: DISCONTINUED | OUTPATIENT
Start: 2022-12-01 | End: 2022-12-03

## 2022-12-01 RX ORDER — ALBUMIN HUMAN 25 %
250 VIAL (ML) INTRAVENOUS ONCE
Refills: 0 | Status: DISCONTINUED | OUTPATIENT
Start: 2022-12-01 | End: 2022-12-02

## 2022-12-01 RX ORDER — ONDANSETRON 8 MG/1
4 TABLET, FILM COATED ORAL ONCE
Refills: 0 | Status: COMPLETED | OUTPATIENT
Start: 2022-12-01 | End: 2022-12-01

## 2022-12-01 RX ORDER — CALCIUM GLUCONATE 100 MG/ML
2 VIAL (ML) INTRAVENOUS ONCE
Refills: 0 | Status: COMPLETED | OUTPATIENT
Start: 2022-12-01 | End: 2022-12-01

## 2022-12-01 RX ADMIN — OXYCODONE HYDROCHLORIDE 15 MILLIGRAM(S): 5 TABLET ORAL at 00:00

## 2022-12-01 RX ADMIN — PANTOPRAZOLE SODIUM 40 MILLIGRAM(S): 20 TABLET, DELAYED RELEASE ORAL at 11:12

## 2022-12-01 RX ADMIN — Medication 11.2 MICROGRAM(S)/KG/MIN: at 05:05

## 2022-12-01 RX ADMIN — VASOPRESSIN 6 UNIT(S)/MIN: 20 INJECTION INTRAVENOUS at 08:05

## 2022-12-01 RX ADMIN — HEPARIN SODIUM 7500 UNIT(S): 5000 INJECTION INTRAVENOUS; SUBCUTANEOUS at 15:21

## 2022-12-01 RX ADMIN — ATORVASTATIN CALCIUM 80 MILLIGRAM(S): 80 TABLET, FILM COATED ORAL at 22:32

## 2022-12-01 RX ADMIN — SPIRONOLACTONE 25 MILLIGRAM(S): 25 TABLET, FILM COATED ORAL at 05:39

## 2022-12-01 RX ADMIN — Medication 40 MILLIGRAM(S): at 17:57

## 2022-12-01 RX ADMIN — Medication 200 GRAM(S): at 15:21

## 2022-12-01 RX ADMIN — AMIODARONE HYDROCHLORIDE 400 MILLIGRAM(S): 400 TABLET ORAL at 15:21

## 2022-12-01 RX ADMIN — ONDANSETRON 4 MILLIGRAM(S): 8 TABLET, FILM COATED ORAL at 06:20

## 2022-12-01 RX ADMIN — Medication 81 MILLIGRAM(S): at 11:12

## 2022-12-01 RX ADMIN — POLYETHYLENE GLYCOL 3350 17 GRAM(S): 17 POWDER, FOR SOLUTION ORAL at 11:12

## 2022-12-01 RX ADMIN — Medication 40 MILLIGRAM(S): at 05:39

## 2022-12-01 RX ADMIN — Medication 0.5 MILLIGRAM(S): at 22:32

## 2022-12-01 RX ADMIN — OXYCODONE HYDROCHLORIDE 15 MILLIGRAM(S): 5 TABLET ORAL at 00:30

## 2022-12-01 RX ADMIN — CHLORHEXIDINE GLUCONATE 1 APPLICATION(S): 213 SOLUTION TOPICAL at 11:10

## 2022-12-01 RX ADMIN — HEPARIN SODIUM 7500 UNIT(S): 5000 INJECTION INTRAVENOUS; SUBCUTANEOUS at 05:39

## 2022-12-01 RX ADMIN — AMIODARONE HYDROCHLORIDE 400 MILLIGRAM(S): 400 TABLET ORAL at 10:22

## 2022-12-01 RX ADMIN — HEPARIN SODIUM 7500 UNIT(S): 5000 INJECTION INTRAVENOUS; SUBCUTANEOUS at 22:31

## 2022-12-01 RX ADMIN — AMIODARONE HYDROCHLORIDE 400 MILLIGRAM(S): 400 TABLET ORAL at 22:31

## 2022-12-01 NOTE — CHART NOTE - NSCHARTNOTEFT_GEN_A_CORE
As per Dr. Reilly. left pleural and mediastinal chest tubes removed at bedside without incident. U stitch tied down in place and occlusive dressing applied. Patient tolerated procedure well. Follow up CXR stable with no obvious ptx

## 2022-12-01 NOTE — PROGRESS NOTE ADULT - SUBJECTIVE AND OBJECTIVE BOX
CTICU  CRITICAL  CARE  attending     Hand off received 					   Pertinent clinical, laboratory, radiographic, hemodynamic, echocardiographic, respiratory data, microbiologic data and chart were reviewed and analyzed frequently throughout the course of the day and night      62 years old male with HTN,  anxiety, morbid obesity.  recently he had increasing SOB, Nausea, vomiting, night sweats and  palpitations for the last few days which progressively worsened.   He was brought in by EMS to [facility] found to be in rapid atrial fibrillation -200, HTN with expiratory wheezing.   He was treated with IV Cardizem and nebulized albuterol which decreased to HR and wheezing.   IV heparin drip was started at that time and given lasix after CXR showed pulmonary edema.   Troponin found to be elevated diagnosed with NSTEMI.   Cardiac cath showed 2 vessel CAD mLAD 60% mCX 50% with EF 40% and medical management recommended.   OLEG showed severe central MR and was transferred to [facility] under Dr. Reilly for surgical evaluation and management.    S/P MVR  S/P CABG x 1 (LIMA to LAD).      HEALTH ISSUES - PROBLEM Dx:  Severe mitral valve regurgitation  CAD (coronary artery disease)  Atrial fibrillation and flutter  HTN (hypertension)  Morbid obesity  Anxiety  NSTEMI (non-ST elevation myocardial infarction)  Acute systolic congestive heart failure        FAMILY HISTORY:  No pertinent family history in first degree relatives    PAST MEDICAL & SURGICAL HISTORY:  Morbid obesity  Rapid palpitations  Anxiety  No significant past surgical history      14 system review was unremarkable    Vital signs, hemodynamic and respiratory parameters were reviewed from the bedside nursing flow sheet.  ICU Vital Signs Last 24 Hrs  T(C): 36.8 (01 Dec 2022 21:05), Max: 36.8 (01 Dec 2022 21:05)  T(F): 98.3 (01 Dec 2022 21:05), Max: 98.3 (01 Dec 2022 21:05)  HR: 77 (01 Dec 2022 22:00) (61 - 77)  BP: --  BP(mean): --  ABP: 131/62 (01 Dec 2022 22:00) (88/52 - 156/74)  ABP(mean): 80 (01 Dec 2022 22:00) (63 - 98)  RR: 20 (01 Dec 2022 22:00) (18 - 27)  SpO2: 94% (01 Dec 2022 22:00) (83% - 97%)    O2 Parameters below as of 01 Dec 2022 22:00  Patient On (Oxygen Delivery Method): nasal cannula w/ humidification  O2 Flow (L/min): 4        Adult Advanced Hemodynamics Last 24 Hrs  CVP(mm Hg): 11 (01 Dec 2022 22:00) (10 - 50)  CVP(cm H2O): --  CO: --  CI: --  PA: --  PA(mean): --  PCWP: --  SVR: --  SVRI: --  PVR: --  PVRI: --, ABG - ( 01 Dec 2022 19:39 )  pH, Arterial: 7.44  pH, Blood: x     /  pCO2: 40    /  pO2: 75    / HCO3: 27    / Base Excess: 2.8   /  SaO2: 97.7                Intake and output was reviewed and the fluid balance was calculated  Daily     Daily   I&O's Summary    30 Nov 2022 07:01  -  01 Dec 2022 07:00  --------------------------------------------------------  IN: 1158.9 mL / OUT: 2235 mL / NET: -1076.1 mL    01 Dec 2022 07:01  -  01 Dec 2022 23:01  --------------------------------------------------------  IN: 1114.4 mL / OUT: 1860 mL / NET: -745.6 mL        All lines and drain sites were assessed    Neuro: Follows commands. Moves all 4 extremities.  Neck: No JVD.  CVS: S1, S2, No S3.  Lungs: Good air entry bilaterally.  Abd: Soft. No tenderness. + Bowel sounds.  Vascular: + DP/PT.  Extremities: No edema.  Lymphatic: Normal.  Skin: No abnormalities.      labs  CBC Full  -  ( 01 Dec 2022 19:25 )  WBC Count : 13.51 K/uL  RBC Count : 3.39 M/uL  Hemoglobin : 11.2 g/dL  Hematocrit : 33.9 %  Platelet Count - Automated : 148 K/uL  Mean Cell Volume : 100.0 fl  Mean Cell Hemoglobin : 33.0 pg  Mean Cell Hemoglobin Concentration : 33.0 gm/dL  Auto Neutrophil # : x  Auto Lymphocyte # : x  Auto Monocyte # : x  Auto Eosinophil # : x  Auto Basophil # : x  Auto Neutrophil % : x  Auto Lymphocyte % : x  Auto Monocyte % : x  Auto Eosinophil % : x  Auto Basophil % : x    12-01    129<L>  |  89<L>  |  17  ----------------------------<  134<H>  4.1   |  28  |  1.31<H>    Ca    9.4      01 Dec 2022 19:25  Phos  3.5     12-01  Mg     2.2     12-01    TPro  7.0  /  Alb  4.0  /  TBili  0.7  /  DBili  x   /  AST  59<H>  /  ALT  25  /  AlkPhos  70  12-01    PT/INR - ( 01 Dec 2022 19:25 )   PT: 14.9 sec;   INR: 1.25          PTT - ( 01 Dec 2022 19:25 )  PTT:32.5 sec  The current medications were reviewed   MEDICATIONS  (STANDING):  albumin human  5% IVPB 250 milliLiter(s) IV Intermittent once  ALPRAZolam 0.5 milliGRAM(s) Oral at bedtime  aMIOdarone    Tablet   Oral   aMIOdarone    Tablet 400 milliGRAM(s) Oral every 8 hours  aspirin enteric coated 81 milliGRAM(s) Oral daily  atorvastatin 80 milliGRAM(s) Oral at bedtime  chlorhexidine 2% Cloths 1 Application(s) Topical daily  DOBUTamine Infusion 3 MICROgram(s)/kG/Min (11.2 mL/Hr) IV Continuous <Continuous>  furosemide    Tablet 40 milliGRAM(s) Oral every 12 hours  heparin   Injectable 7500 Unit(s) SubCutaneous every 8 hours  pantoprazole    Tablet 40 milliGRAM(s) Oral daily  polyethylene glycol 3350 17 Gram(s) Oral daily  sodium chloride 0.9%. 1000 milliLiter(s) (10 mL/Hr) IV Continuous <Continuous>  spironolactone 25 milliGRAM(s) Oral daily  vasopressin Infusion 0.04 Unit(s)/Min (6 mL/Hr) IV Continuous <Continuous>    MEDICATIONS  (PRN):  oxyCODONE    IR 15 milliGRAM(s) Oral every 4 hours PRN Moderate Pain (4 - 6)        PROBLEM LIST/ ASSESSMENT:  HEALTH ISSUES - PROBLEM Dx:  Severe mitral valve regurgitation  CAD (coronary artery disease)  Atrial fibrillation and flutter  HTN (hypertension)  Morbid obesity  Anxiety  NSTEMI (non-ST elevation myocardial infarction)  Acute systolic congestive heart failure        62 years old male with CHF due to severe mitral regurgitation.  S/P MVR  S/P Cryo maze  S/P CABG (LIMA to LAD).  Hyponatremia  Hypochloremia  Metabolic alkalosis.  Hemodynamically stable.  Good oxygenation.  Fair urine out put.        My plan includes :  D/C Dobutamine.  Statin and Betablocker.  Aldactone and Lasix.  Close hemodynamic, ventilatory and drain monitoring and management  Monitor for arrhythmias and monitor parameters for organ perfusion  Monitor neurologic status  Monitor renal function.  Head of the bed should remain elevated to 45 deg .   Chest PT and IS will be encouraged  Monitor adequacy of oxygenation and ventilation and attempt to wean oxygen  Nutritional goals will be met using po eventually , ensure adequate caloric intake and monitor the same  Stress ulcer and VTE prophylaxis will be achieved    Glycemic control is satisfactory  Electrolytes have been repleted as necessary and wound care has been carried out. Pain control has been achieved.   Aggressive physical therapy and early mobility and ambulation goals will be met   The family was updated about the course and plan  CRITICAL CARE TIME SPENT in evaluation and management, reassessments, review and interpretation of labs and x-rays, ventilator and hemodynamic management, formulating a plan and coordinating care: ___30____ MIN.  Time does not include procedural time.  CTICU ATTENDING     					    Rhys Momin MD

## 2022-12-01 NOTE — PROGRESS NOTE ADULT - SUBJECTIVE AND OBJECTIVE BOX
CTICU  CRITICAL  CARE  attending     Hand off received 					   Pertinent clinical, laboratory, radiographic, hemodynamic, echocardiographic, respiratory data, microbiologic data and chart were reviewed and analyzed frequently throughout the course of the day and night  Patient seen and examined with CTS/ SH attending at bedside  Pt is a 62y , Male, HEALTH ISSUES - PROBLEM Dx:  Severe mitral valve regurgitation    CAD (coronary artery disease)    Atrial fibrillation and flutter    HTN (hypertension)    Morbid obesity    Anxiety    NSTEMI (non-ST elevation myocardial infarction)    Acute systolic congestive heart failure        , FAMILY HISTORY:  No pertinent family history in first degree relatives    PAST MEDICAL & SURGICAL HISTORY:  Morbid obesity      Rapid palpitations      Anxiety      No significant past surgical history        Patient is a 62y old  Male who presents with a chief complaint of CAD (30 Nov 2022 21:00)      14 system review limited by mentation and multiorgan morbidity     Vital signs, hemodynamic and respiratory parameters were reviewed from the bedside nursing flowsheet.  ICU Vital Signs Last 24 Hrs  T(C): 36.8 (01 Dec 2022 21:05), Max: 36.8 (01 Dec 2022 21:05)  T(F): 98.3 (01 Dec 2022 21:05), Max: 98.3 (01 Dec 2022 21:05)  HR: 77 (01 Dec 2022 22:00) (61 - 77)  BP: --  BP(mean): --  ABP: 131/62 (01 Dec 2022 22:00) (88/52 - 156/74)  ABP(mean): 80 (01 Dec 2022 22:00) (63 - 98)  RR: 20 (01 Dec 2022 22:00) (18 - 27)  SpO2: 94% (01 Dec 2022 22:00) (83% - 97%)    O2 Parameters below as of 01 Dec 2022 22:00  Patient On (Oxygen Delivery Method): nasal cannula w/ humidification  O2 Flow (L/min): 4        Adult Advanced Hemodynamics Last 24 Hrs  CVP(mm Hg): 11 (01 Dec 2022 22:00) (10 - 50)  CVP(cm H2O): --  CO: --  CI: --  PA: --  PA(mean): --  PCWP: --  SVR: --  SVRI: --  PVR: --  PVRI: --, ABG - ( 01 Dec 2022 19:39 )  pH, Arterial: 7.44  pH, Blood: x     /  pCO2: 40    /  pO2: 75    / HCO3: 27    / Base Excess: 2.8   /  SaO2: 97.7                Intake and output was reviewed and the fluid balance was calculated  Daily     Daily   I&O's Summary    30 Nov 2022 07:01  -  01 Dec 2022 07:00  --------------------------------------------------------  IN: 1158.9 mL / OUT: 2235 mL / NET: -1076.1 mL    01 Dec 2022 07:01  -  01 Dec 2022 22:18  --------------------------------------------------------  IN: 1114.4 mL / OUT: 1860 mL / NET: -745.6 mL        All lines and drain sites were assessed  [facility]      POCT Blood Glucose.: 138 mg/dL (01 Dec 2022 07:04)    No acute change in focality  Auscultation of the chest reveals equal bs  Abdomen is soft  Extremities are warm and well perfused  Wounds appear clean and unremarkable  Antibiotics are periop    labs  CBC Full  -  ( 01 Dec 2022 19:25 )  WBC Count : 13.51 K/uL  RBC Count : 3.39 M/uL  Hemoglobin : 11.2 g/dL  Hematocrit : 33.9 %  Platelet Count - Automated : 148 K/uL  Mean Cell Volume : 100.0 fl  Mean Cell Hemoglobin : 33.0 pg  Mean Cell Hemoglobin Concentration : 33.0 gm/dL  Auto Neutrophil # : x  Auto Lymphocyte # : x  Auto Monocyte # : x  Auto Eosinophil # : x  Auto Basophil # : x  Auto Neutrophil % : x  Auto Lymphocyte % : x  Auto Monocyte % : x  Auto Eosinophil % : x  Auto Basophil % : x    12-01    129<L>  |  89<L>  |  17  ----------------------------<  134<H>  4.1   |  28  |  1.31<H>    Ca    9.4      01 Dec 2022 19:25  Phos  3.5     12-01  Mg     2.2     12-01    TPro  7.0  /  Alb  4.0  /  TBili  0.7  /  DBili  x   /  AST  59<H>  /  ALT  25  /  AlkPhos  70  12-01    PT/INR - ( 01 Dec 2022 19:25 )   PT: 14.9 sec;   INR: 1.25          PTT - ( 01 Dec 2022 19:25 )  PTT:32.5 sec  The current medications were reviewed   MEDICATIONS  (STANDING):  albumin human  5% IVPB 250 milliLiter(s) IV Intermittent once  ALPRAZolam 0.5 milliGRAM(s) Oral at bedtime  aMIOdarone    Tablet   Oral   aMIOdarone    Tablet 400 milliGRAM(s) Oral every 8 hours  aspirin enteric coated 81 milliGRAM(s) Oral daily  atorvastatin 80 milliGRAM(s) Oral at bedtime  chlorhexidine 2% Cloths 1 Application(s) Topical daily  DOBUTamine Infusion 3 MICROgram(s)/kG/Min (11.2 mL/Hr) IV Continuous <Continuous>  furosemide    Tablet 40 milliGRAM(s) Oral every 12 hours  heparin   Injectable 7500 Unit(s) SubCutaneous every 8 hours  pantoprazole    Tablet 40 milliGRAM(s) Oral daily  polyethylene glycol 3350 17 Gram(s) Oral daily  sodium chloride 0.9%. 1000 milliLiter(s) (10 mL/Hr) IV Continuous <Continuous>  spironolactone 25 milliGRAM(s) Oral daily  vasopressin Infusion 0.04 Unit(s)/Min (6 mL/Hr) IV Continuous <Continuous>    MEDICATIONS  (PRN):  oxyCODONE    IR 15 milliGRAM(s) Oral every 4 hours PRN Moderate Pain (4 - 6)       PROBLEM LIST/ ASSESSMENT:  HEALTH ISSUES - PROBLEM Dx:  Severe mitral valve regurgitation    CAD (coronary artery disease)    Atrial fibrillation and flutter    HTN (hypertension)    Morbid obesity    Anxiety    NSTEMI (non-ST elevation myocardial infarction)    Acute systolic congestive heart failure        ,   Patient is a 62y old  Male who presents with a chief complaint of CAD (30 Nov 2022 21:00)     s/p cardiac surgery                My plan includes :  close hemodynamic, ventilatory and drain monitoring and management per post op routine    Monitor for arrhythmias and monitor parameters for organ perfusion  beta blockade not administered due to hemodynamic instability and bradycardia  monitor neurologic status  Head of the bed should remain elevated to 45 deg .   chest PT and IS will be encouraged  monitor adequacy of oxygenation and ventilation and attempt to wean oxygen  antibiotic regimen will be tailored to the clinical, laboratory and microbiologic data  Nutritional goals will be met using po eventually , ensure adequate caloric intake and montior the same  Stress ulcer and VTE prophylaxis will be achieved    Glycemic control is satisfactory  Electrolytes have been repleted as necessary and wound care has been carried out. Pain control has been achieved.   agressive physical therapy and early mobility and ambulation goals will be met   The family was updated about the course and plan  CRITICAL CARE TIME personally provided by me  in evaluation and management, reassessments, review and interpretation of labs and x-rays, ventilator and hemodynamic management, formulating a plan and coordinating care: ___90____ MIN.  Time does not include procedural time. Time spent was non routine post-operarive caRE and included multiple and repeated evaluations at the bedside  CTICU ATTENDING     					    Luisito Farrell MD

## 2022-12-02 LAB
ALBUMIN SERPL ELPH-MCNC: 3.7 G/DL — SIGNIFICANT CHANGE UP (ref 3.3–5)
ALBUMIN SERPL ELPH-MCNC: 3.8 G/DL — SIGNIFICANT CHANGE UP (ref 3.3–5)
ALP SERPL-CCNC: 66 U/L — SIGNIFICANT CHANGE UP (ref 40–120)
ALP SERPL-CCNC: 77 U/L — SIGNIFICANT CHANGE UP (ref 40–120)
ALT FLD-CCNC: 23 U/L — SIGNIFICANT CHANGE UP (ref 10–45)
ALT FLD-CCNC: 26 U/L — SIGNIFICANT CHANGE UP (ref 10–45)
ANION GAP SERPL CALC-SCNC: 10 MMOL/L — SIGNIFICANT CHANGE UP (ref 5–17)
ANION GAP SERPL CALC-SCNC: 9 MMOL/L — SIGNIFICANT CHANGE UP (ref 5–17)
APTT BLD: 36.3 SEC — HIGH (ref 27.5–35.5)
APTT BLD: 42.2 SEC — HIGH (ref 27.5–35.5)
AST SERPL-CCNC: 51 U/L — HIGH (ref 10–40)
AST SERPL-CCNC: 56 U/L — HIGH (ref 10–40)
BILIRUB SERPL-MCNC: 0.7 MG/DL — SIGNIFICANT CHANGE UP (ref 0.2–1.2)
BILIRUB SERPL-MCNC: 0.8 MG/DL — SIGNIFICANT CHANGE UP (ref 0.2–1.2)
BUN SERPL-MCNC: 16 MG/DL — SIGNIFICANT CHANGE UP (ref 7–23)
BUN SERPL-MCNC: 18 MG/DL — SIGNIFICANT CHANGE UP (ref 7–23)
CALCIUM SERPL-MCNC: 8.8 MG/DL — SIGNIFICANT CHANGE UP (ref 8.4–10.5)
CALCIUM SERPL-MCNC: 9 MG/DL — SIGNIFICANT CHANGE UP (ref 8.4–10.5)
CHLORIDE SERPL-SCNC: 87 MMOL/L — LOW (ref 96–108)
CHLORIDE SERPL-SCNC: 88 MMOL/L — LOW (ref 96–108)
CO2 SERPL-SCNC: 31 MMOL/L — SIGNIFICANT CHANGE UP (ref 22–31)
CO2 SERPL-SCNC: 32 MMOL/L — HIGH (ref 22–31)
CREAT SERPL-MCNC: 1.23 MG/DL — SIGNIFICANT CHANGE UP (ref 0.5–1.3)
CREAT SERPL-MCNC: 1.38 MG/DL — HIGH (ref 0.5–1.3)
EGFR: 57 ML/MIN/1.73M2 — LOW
EGFR: 66 ML/MIN/1.73M2 — SIGNIFICANT CHANGE UP
GAS PNL BLDA: SIGNIFICANT CHANGE UP
GAS PNL BLDA: SIGNIFICANT CHANGE UP
GLUCOSE SERPL-MCNC: 111 MG/DL — HIGH (ref 70–99)
GLUCOSE SERPL-MCNC: 134 MG/DL — HIGH (ref 70–99)
HCT VFR BLD CALC: 30.3 % — LOW (ref 39–50)
HCT VFR BLD CALC: 33.7 % — LOW (ref 39–50)
HGB BLD-MCNC: 10.3 G/DL — LOW (ref 13–17)
HGB BLD-MCNC: 11.6 G/DL — LOW (ref 13–17)
INR BLD: 1.26 — HIGH (ref 0.88–1.16)
INR BLD: 1.29 — HIGH (ref 0.88–1.16)
MAGNESIUM SERPL-MCNC: 2.1 MG/DL — SIGNIFICANT CHANGE UP (ref 1.6–2.6)
MAGNESIUM SERPL-MCNC: 2.1 MG/DL — SIGNIFICANT CHANGE UP (ref 1.6–2.6)
MCHC RBC-ENTMCNC: 32.9 PG — SIGNIFICANT CHANGE UP (ref 27–34)
MCHC RBC-ENTMCNC: 33.5 PG — SIGNIFICANT CHANGE UP (ref 27–34)
MCHC RBC-ENTMCNC: 34 GM/DL — SIGNIFICANT CHANGE UP (ref 32–36)
MCHC RBC-ENTMCNC: 34.4 GM/DL — SIGNIFICANT CHANGE UP (ref 32–36)
MCV RBC AUTO: 96.8 FL — SIGNIFICANT CHANGE UP (ref 80–100)
MCV RBC AUTO: 97.4 FL — SIGNIFICANT CHANGE UP (ref 80–100)
NRBC # BLD: 0 /100 WBCS — SIGNIFICANT CHANGE UP (ref 0–0)
NRBC # BLD: 0 /100 WBCS — SIGNIFICANT CHANGE UP (ref 0–0)
PHOSPHATE SERPL-MCNC: 3.5 MG/DL — SIGNIFICANT CHANGE UP (ref 2.5–4.5)
PHOSPHATE SERPL-MCNC: 3.7 MG/DL — SIGNIFICANT CHANGE UP (ref 2.5–4.5)
PLATELET # BLD AUTO: 134 K/UL — LOW (ref 150–400)
PLATELET # BLD AUTO: 180 K/UL — SIGNIFICANT CHANGE UP (ref 150–400)
POTASSIUM SERPL-MCNC: 3.4 MMOL/L — LOW (ref 3.5–5.3)
POTASSIUM SERPL-MCNC: 3.5 MMOL/L — SIGNIFICANT CHANGE UP (ref 3.5–5.3)
POTASSIUM SERPL-SCNC: 3.4 MMOL/L — LOW (ref 3.5–5.3)
POTASSIUM SERPL-SCNC: 3.5 MMOL/L — SIGNIFICANT CHANGE UP (ref 3.5–5.3)
PROT SERPL-MCNC: 6.4 G/DL — SIGNIFICANT CHANGE UP (ref 6–8.3)
PROT SERPL-MCNC: 6.9 G/DL — SIGNIFICANT CHANGE UP (ref 6–8.3)
PROTHROM AB SERPL-ACNC: 15 SEC — HIGH (ref 10.5–13.4)
PROTHROM AB SERPL-ACNC: 15.4 SEC — HIGH (ref 10.5–13.4)
RBC # BLD: 3.13 M/UL — LOW (ref 4.2–5.8)
RBC # BLD: 3.46 M/UL — LOW (ref 4.2–5.8)
RBC # FLD: 14 % — SIGNIFICANT CHANGE UP (ref 10.3–14.5)
RBC # FLD: 14.2 % — SIGNIFICANT CHANGE UP (ref 10.3–14.5)
SODIUM SERPL-SCNC: 128 MMOL/L — LOW (ref 135–145)
SODIUM SERPL-SCNC: 129 MMOL/L — LOW (ref 135–145)
WBC # BLD: 11.22 K/UL — HIGH (ref 3.8–10.5)
WBC # BLD: 9.81 K/UL — SIGNIFICANT CHANGE UP (ref 3.8–10.5)
WBC # FLD AUTO: 11.22 K/UL — HIGH (ref 3.8–10.5)
WBC # FLD AUTO: 9.81 K/UL — SIGNIFICANT CHANGE UP (ref 3.8–10.5)

## 2022-12-02 PROCEDURE — 71045 X-RAY EXAM CHEST 1 VIEW: CPT | Mod: 26

## 2022-12-02 RX ORDER — SODIUM CHLORIDE 9 MG/ML
3 INJECTION INTRAMUSCULAR; INTRAVENOUS; SUBCUTANEOUS EVERY 8 HOURS
Refills: 0 | Status: DISCONTINUED | OUTPATIENT
Start: 2022-12-02 | End: 2022-12-07

## 2022-12-02 RX ORDER — ACETAMINOPHEN 500 MG
1000 TABLET ORAL ONCE
Refills: 0 | Status: COMPLETED | OUTPATIENT
Start: 2022-12-02 | End: 2022-12-02

## 2022-12-02 RX ORDER — POTASSIUM CHLORIDE 20 MEQ
40 PACKET (EA) ORAL ONCE
Refills: 0 | Status: COMPLETED | OUTPATIENT
Start: 2022-12-02 | End: 2022-12-02

## 2022-12-02 RX ADMIN — HEPARIN SODIUM 7500 UNIT(S): 5000 INJECTION INTRAVENOUS; SUBCUTANEOUS at 14:44

## 2022-12-02 RX ADMIN — Medication 1000 MILLIGRAM(S): at 05:00

## 2022-12-02 RX ADMIN — SODIUM CHLORIDE 3 MILLILITER(S): 9 INJECTION INTRAMUSCULAR; INTRAVENOUS; SUBCUTANEOUS at 14:16

## 2022-12-02 RX ADMIN — AMIODARONE HYDROCHLORIDE 400 MILLIGRAM(S): 400 TABLET ORAL at 06:22

## 2022-12-02 RX ADMIN — HEPARIN SODIUM 7500 UNIT(S): 5000 INJECTION INTRAVENOUS; SUBCUTANEOUS at 22:58

## 2022-12-02 RX ADMIN — AMIODARONE HYDROCHLORIDE 400 MILLIGRAM(S): 400 TABLET ORAL at 22:58

## 2022-12-02 RX ADMIN — PANTOPRAZOLE SODIUM 40 MILLIGRAM(S): 20 TABLET, DELAYED RELEASE ORAL at 11:49

## 2022-12-02 RX ADMIN — AMIODARONE HYDROCHLORIDE 400 MILLIGRAM(S): 400 TABLET ORAL at 14:39

## 2022-12-02 RX ADMIN — CHLORHEXIDINE GLUCONATE 1 APPLICATION(S): 213 SOLUTION TOPICAL at 11:50

## 2022-12-02 RX ADMIN — SODIUM CHLORIDE 3 MILLILITER(S): 9 INJECTION INTRAMUSCULAR; INTRAVENOUS; SUBCUTANEOUS at 21:52

## 2022-12-02 RX ADMIN — Medication 40 MILLIEQUIVALENT(S): at 11:49

## 2022-12-02 RX ADMIN — Medication 40 MILLIGRAM(S): at 18:12

## 2022-12-02 RX ADMIN — Medication 0.5 MILLIGRAM(S): at 23:44

## 2022-12-02 RX ADMIN — OXYCODONE HYDROCHLORIDE 15 MILLIGRAM(S): 5 TABLET ORAL at 20:14

## 2022-12-02 RX ADMIN — ATORVASTATIN CALCIUM 80 MILLIGRAM(S): 80 TABLET, FILM COATED ORAL at 22:58

## 2022-12-02 RX ADMIN — HEPARIN SODIUM 7500 UNIT(S): 5000 INJECTION INTRAVENOUS; SUBCUTANEOUS at 06:21

## 2022-12-02 RX ADMIN — Medication 81 MILLIGRAM(S): at 11:48

## 2022-12-02 RX ADMIN — Medication 400 MILLIGRAM(S): at 04:30

## 2022-12-02 RX ADMIN — SPIRONOLACTONE 25 MILLIGRAM(S): 25 TABLET, FILM COATED ORAL at 06:22

## 2022-12-02 RX ADMIN — Medication 40 MILLIGRAM(S): at 06:22

## 2022-12-02 RX ADMIN — POLYETHYLENE GLYCOL 3350 17 GRAM(S): 17 POWDER, FOR SOLUTION ORAL at 11:49

## 2022-12-02 NOTE — PROGRESS NOTE ADULT - SUBJECTIVE AND OBJECTIVE BOX
Patient discussed on morning rounds with Dr. Reilly    Operation / Date: 11/28/22 MV repair, CABG x1 (LIMA to LAD), CHANDRIKA clip, biatrial cryomaze, EF 40%    SUBJECTIVE ASSESSMENT:  62y Male seen and examined at bedside.  Patient ambulated from 9E to 9LA.  Denies chest pain, shortness of breath, nausea, vomiting.  Able to pull 1500cc on IS.  No BM yet.        Vital Signs Last 24 Hrs  T(C): 36.4 (02 Dec 2022 14:14), Max: 36.8 (01 Dec 2022 21:05)  T(F): 97.6 (02 Dec 2022 14:14), Max: 98.3 (01 Dec 2022 21:05)  HR: 51 (02 Dec 2022 13:40) (48 - 77)  BP: 99/55 (02 Dec 2022 13:40) (91/53 - 138/66)  BP(mean): 67 (02 Dec 2022 13:00) (66 - 86)  RR: 22 (02 Dec 2022 13:00) (12 - 28)  SpO2: 95% (02 Dec 2022 13:40) (92% - 97%)    Parameters below as of 02 Dec 2022 13:40  Patient On (Oxygen Delivery Method): nasal cannula  O2 Flow (L/min): 4    I&O's Detail    01 Dec 2022 07:01  -  02 Dec 2022 07:00  --------------------------------------------------------  IN:    Amiodarone: 16.7 mL    DOBUTamine: 138.4 mL    IV PiggyBack: 100 mL    Oral Fluid: 1120 mL    Vasopressin: 9 mL  Total IN: 1384.1 mL    OUT:    Bulb (mL): 10 mL    Chest Tube (mL): 20 mL    Indwelling Catheter - Urethral (mL): 3505 mL  Total OUT: 3535 mL    Total NET: -2150.9 mL      02 Dec 2022 07:01  -  02 Dec 2022 14:42  --------------------------------------------------------  IN:    Oral Fluid: 320 mL  Total IN: 320 mL    OUT:    Indwelling Catheter - Urethral (mL): 350 mL  Total OUT: 350 mL    Total NET: -30 mL          CHEST TUBE:  No.   JUSTUS DRAIN:  No.  EPICARDIAL WIRES: Yes.  TIE DOWNS: Yes.  HUERTA: No.    PHYSICAL EXAM:    GEN: NAD, looks comfortable  Psych: Mood appropriate  Neuro: A&Ox3.  No focal deficits.  Moving all extremities.   HEENT: No obvious abnormalities  CV: S1S2, regular, no murmurs appreciated.  No carotid bruits.  No JVD  Lungs: Clear B/L.  No wheezing, rales or rhonchi  ABD: Soft, non-tender, non-distended.  +Bowel sounds  EXT: Warm and well perfused.  No peripheral edema noted  Musculoskeletal: Moving all extremities with normal ROM, no joint swelling  PV: Pedal pulses palpable  Incision: midsternal incision CDI, no drainage or erythema, stable sternum    LABS:                        11.6   11.22 )-----------( 180      ( 02 Dec 2022 09:18 )             33.7       COUMADIN:  No. REASON: .    PT/INR - ( 02 Dec 2022 09:18 )   PT: 15.0 sec;   INR: 1.26          PTT - ( 02 Dec 2022 09:18 )  PTT:36.3 sec    12-02    128<L>  |  87<L>  |  18  ----------------------------<  134<H>  3.4<L>   |  31  |  1.38<H>    Ca    9.0      02 Dec 2022 09:18  Phos  3.5     12-02  Mg     2.1     12-02    TPro  6.9  /  Alb  3.8  /  TBili  0.8  /  DBili  x   /  AST  56<H>  /  ALT  26  /  AlkPhos  77  12-02          MEDICATIONS  (STANDING):  ALPRAZolam 0.5 milliGRAM(s) Oral at bedtime  aMIOdarone    Tablet   Oral   aMIOdarone    Tablet 400 milliGRAM(s) Oral every 8 hours  aspirin enteric coated 81 milliGRAM(s) Oral daily  atorvastatin 80 milliGRAM(s) Oral at bedtime  chlorhexidine 2% Cloths 1 Application(s) Topical daily  furosemide    Tablet 40 milliGRAM(s) Oral every 12 hours  heparin   Injectable 7500 Unit(s) SubCutaneous every 8 hours  pantoprazole    Tablet 40 milliGRAM(s) Oral daily  polyethylene glycol 3350 17 Gram(s) Oral daily  sodium chloride 0.9% lock flush 3 milliLiter(s) IV Push every 8 hours  spironolactone 25 milliGRAM(s) Oral daily    MEDICATIONS  (PRN):  oxyCODONE    IR 15 milliGRAM(s) Oral every 4 hours PRN Moderate Pain (4 - 6)        RADIOLOGY & ADDITIONAL TESTS:

## 2022-12-02 NOTE — PROGRESS NOTE ADULT - ASSESSMENT
63 y/o male PMH anxiety, morbid obesity, "fast heart rate" (not on blood thinners) who c/o SOB, N&V, night sweats and  palpitations for the last few days which progressively worsened. Patient was brought by EMS to [facility] and found to be in rapid atrial fibrillation -200, HTN with expiratory wheezing. Given Cardizem and albuterol with improvement. Placed on heparin drip at that time and given lasix after CXR showed pulmonary edema. Troponin found to be elevated, ruled in for NSTEMI. Cardiac cath completed and showed 2 vessel CAD mLAD 60% mCX 50% with EF 40% and medical management recommended. OLEG at OSH showed severe central MR. Patient was transferred to [facility] under the care of Dr. Reilly for surgical evaluation of his MR. Heparin gtt started, IV lasix started, amio loaded. TTE with EF 30%. Creatinine increased, IV lasix held. On 22 patient underwent MV repair, CABGx1, biatrial cryomaze EF 40%.  Patient arrived to CTICU on Levo, vaso, , sinus danny/junctional requiring pacing, received no products intraop.  POD 1 Extubated to Bipap.  POD 2 Weaned off vaso/levo.  POD 3 CT removed,  weaning down.  POD 4  weaned off, transferred to [facility].    ==== Neurovascular ====  -No delirium, pain well managed on current regimen  -C/w PRNs for Pain control  -Monitor neuro status    ==== Respiratory ====  -Saturates well on RA     -AM CXR stable, repeat in AM  -Encourage IS 10x/hour while awake, Cough and deep breathing exercises  -Monitor respiratory status via SpO2    ==== Cardiovascular ====  Monitor on Tele  Continue aspirin 81mg QD  Continue Statin  Continue Spironolactone 25mg QD  Continue amio load    ==== GI ====   -Tolerating PO  -Prophylaxis: Protonix  -C/w bowel regimen    ==== /Renal ====  -BUN/Cr: 18/1.38  -Continue Lasix 40mg IV BID  -Trend Cr on AM labs  -Replete electrolytes as needed    ==== ID ====   Afebrile, asymptomatic  -WBC: 11.2  -Continue to monitor for SIRS/Sepsis syndrome while inpatient    ==== Endocrine ====   -A1C: 6.1, continue ISS  -TSH: 3.06, no h/o thyroid disease     ==== Hematologic ====   -H/H: 11.6/33.7  -CBC, chem in AM  -DVT ppx: HSQ 7500u q8h and SCDs    ==== Disposition Planning ====  Telemetry

## 2022-12-03 LAB
ALBUMIN SERPL ELPH-MCNC: 4.1 G/DL — SIGNIFICANT CHANGE UP (ref 3.3–5)
ALP SERPL-CCNC: 83 U/L — SIGNIFICANT CHANGE UP (ref 40–120)
ALT FLD-CCNC: 28 U/L — SIGNIFICANT CHANGE UP (ref 10–45)
ANION GAP SERPL CALC-SCNC: 10 MMOL/L — SIGNIFICANT CHANGE UP (ref 5–17)
APTT BLD: 31.8 SEC — SIGNIFICANT CHANGE UP (ref 27.5–35.5)
AST SERPL-CCNC: 48 U/L — HIGH (ref 10–40)
BILIRUB SERPL-MCNC: 1 MG/DL — SIGNIFICANT CHANGE UP (ref 0.2–1.2)
BUN SERPL-MCNC: 21 MG/DL — SIGNIFICANT CHANGE UP (ref 7–23)
CALCIUM SERPL-MCNC: 9.3 MG/DL — SIGNIFICANT CHANGE UP (ref 8.4–10.5)
CHLORIDE SERPL-SCNC: 84 MMOL/L — LOW (ref 96–108)
CO2 SERPL-SCNC: 38 MMOL/L — HIGH (ref 22–31)
CREAT SERPL-MCNC: 1.43 MG/DL — HIGH (ref 0.5–1.3)
EGFR: 55 ML/MIN/1.73M2 — LOW
GLUCOSE SERPL-MCNC: 114 MG/DL — HIGH (ref 70–99)
HCT VFR BLD CALC: 34.8 % — LOW (ref 39–50)
HGB BLD-MCNC: 11.7 G/DL — LOW (ref 13–17)
INR BLD: 1.17 — HIGH (ref 0.88–1.16)
MAGNESIUM SERPL-MCNC: 2.2 MG/DL — SIGNIFICANT CHANGE UP (ref 1.6–2.6)
MCHC RBC-ENTMCNC: 32.8 PG — SIGNIFICANT CHANGE UP (ref 27–34)
MCHC RBC-ENTMCNC: 33.6 GM/DL — SIGNIFICANT CHANGE UP (ref 32–36)
MCV RBC AUTO: 97.5 FL — SIGNIFICANT CHANGE UP (ref 80–100)
NRBC # BLD: 0 /100 WBCS — SIGNIFICANT CHANGE UP (ref 0–0)
PHOSPHATE SERPL-MCNC: 3.4 MG/DL — SIGNIFICANT CHANGE UP (ref 2.5–4.5)
PLATELET # BLD AUTO: 245 K/UL — SIGNIFICANT CHANGE UP (ref 150–400)
POTASSIUM SERPL-MCNC: 3.4 MMOL/L — LOW (ref 3.5–5.3)
POTASSIUM SERPL-SCNC: 3.4 MMOL/L — LOW (ref 3.5–5.3)
PROT SERPL-MCNC: 7.5 G/DL — SIGNIFICANT CHANGE UP (ref 6–8.3)
PROTHROM AB SERPL-ACNC: 14 SEC — HIGH (ref 10.5–13.4)
RBC # BLD: 3.57 M/UL — LOW (ref 4.2–5.8)
RBC # FLD: 13.8 % — SIGNIFICANT CHANGE UP (ref 10.3–14.5)
SODIUM SERPL-SCNC: 132 MMOL/L — LOW (ref 135–145)
WBC # BLD: 10.64 K/UL — HIGH (ref 3.8–10.5)
WBC # FLD AUTO: 10.64 K/UL — HIGH (ref 3.8–10.5)

## 2022-12-03 RX ORDER — AMIODARONE HYDROCHLORIDE 400 MG/1
400 TABLET ORAL
Refills: 0 | Status: DISCONTINUED | OUTPATIENT
Start: 2022-12-03 | End: 2022-12-03

## 2022-12-03 RX ORDER — ACETAZOLAMIDE 250 MG/1
250 TABLET ORAL ONCE
Refills: 0 | Status: COMPLETED | OUTPATIENT
Start: 2022-12-03 | End: 2022-12-03

## 2022-12-03 RX ORDER — CARVEDILOL PHOSPHATE 80 MG/1
3.12 CAPSULE, EXTENDED RELEASE ORAL EVERY 12 HOURS
Refills: 0 | Status: DISCONTINUED | OUTPATIENT
Start: 2022-12-03 | End: 2022-12-04

## 2022-12-03 RX ADMIN — AMIODARONE HYDROCHLORIDE 400 MILLIGRAM(S): 400 TABLET ORAL at 05:42

## 2022-12-03 RX ADMIN — ATORVASTATIN CALCIUM 80 MILLIGRAM(S): 80 TABLET, FILM COATED ORAL at 21:37

## 2022-12-03 RX ADMIN — PANTOPRAZOLE SODIUM 40 MILLIGRAM(S): 20 TABLET, DELAYED RELEASE ORAL at 11:54

## 2022-12-03 RX ADMIN — SODIUM CHLORIDE 3 MILLILITER(S): 9 INJECTION INTRAMUSCULAR; INTRAVENOUS; SUBCUTANEOUS at 05:23

## 2022-12-03 RX ADMIN — HEPARIN SODIUM 7500 UNIT(S): 5000 INJECTION INTRAVENOUS; SUBCUTANEOUS at 15:16

## 2022-12-03 RX ADMIN — SPIRONOLACTONE 25 MILLIGRAM(S): 25 TABLET, FILM COATED ORAL at 05:42

## 2022-12-03 RX ADMIN — SODIUM CHLORIDE 3 MILLILITER(S): 9 INJECTION INTRAMUSCULAR; INTRAVENOUS; SUBCUTANEOUS at 22:28

## 2022-12-03 RX ADMIN — CARVEDILOL PHOSPHATE 3.12 MILLIGRAM(S): 80 CAPSULE, EXTENDED RELEASE ORAL at 17:40

## 2022-12-03 RX ADMIN — OXYCODONE HYDROCHLORIDE 15 MILLIGRAM(S): 5 TABLET ORAL at 23:00

## 2022-12-03 RX ADMIN — SODIUM CHLORIDE 3 MILLILITER(S): 9 INJECTION INTRAMUSCULAR; INTRAVENOUS; SUBCUTANEOUS at 15:09

## 2022-12-03 RX ADMIN — Medication 0.5 MILLIGRAM(S): at 21:37

## 2022-12-03 RX ADMIN — Medication 10 MILLIGRAM(S): at 12:15

## 2022-12-03 RX ADMIN — HEPARIN SODIUM 7500 UNIT(S): 5000 INJECTION INTRAVENOUS; SUBCUTANEOUS at 21:36

## 2022-12-03 RX ADMIN — ACETAZOLAMIDE 250 MILLIGRAM(S): 250 TABLET ORAL at 21:37

## 2022-12-03 RX ADMIN — Medication 40 MILLIGRAM(S): at 05:42

## 2022-12-03 RX ADMIN — AMIODARONE HYDROCHLORIDE 400 MILLIGRAM(S): 400 TABLET ORAL at 17:40

## 2022-12-03 RX ADMIN — OXYCODONE HYDROCHLORIDE 15 MILLIGRAM(S): 5 TABLET ORAL at 22:22

## 2022-12-03 RX ADMIN — Medication 40 MILLIGRAM(S): at 17:40

## 2022-12-03 RX ADMIN — Medication 81 MILLIGRAM(S): at 11:54

## 2022-12-03 RX ADMIN — POLYETHYLENE GLYCOL 3350 17 GRAM(S): 17 POWDER, FOR SOLUTION ORAL at 11:54

## 2022-12-03 RX ADMIN — HEPARIN SODIUM 7500 UNIT(S): 5000 INJECTION INTRAVENOUS; SUBCUTANEOUS at 05:42

## 2022-12-03 NOTE — PROGRESS NOTE ADULT - ASSESSMENT
61 y/o male PMH anxiety, morbid obesity, "fast heart rate" (not on blood thinners) who c/o SOB, N&V, night sweats and  palpitations for the last few days which progressively worsened. Patient was brought by EMS to [facility] and found to be in rapid atrial fibrillation -200, HTN with expiratory wheezing. Given Cardizem and albuterol with improvement. Placed on heparin drip at that time and given lasix after CXR showed pulmonary edema. Troponin found to be elevated, ruled in for NSTEMI. Cardiac cath completed and showed 2 vessel CAD mLAD 60% mCX 50% with EF 40% and medical management recommended. OLEG at OSH showed severe central MR. Patient was transferred to [facility] under the care of Dr. Reilly for surgical evaluation of his MR. Heparin gtt started, IV lasix started, amio loaded. TTE with EF 30%. Creatinine increased, IV lasix held. On 22 patient underwent MV repair, CABGx1, biatrial cryomaze EF 40%.  Patient arrived to CTICU on Levo, vaso, , sinus danny/junctional requiring pacing, received no products intraop.  POD 1 Extubated to Bipap.  POD 2 Weaned off vaso/levo.  POD 3 CT removed,  weaning down.  POD 4  weaned off, transferred to [facility]. POD 5 Hyponatremia improving. Diamox given for urine alkalization. Continuing in slow afib in the 40-50s V paced at 60.       Neuro: pain management   - Oxy PRN   - Xanax prn at bedtime.    CVS: MVr CABG x1   - Continue ASA, Statin for CAD   - Amio on hold for afib in the 40-50s.   - continuing coreg 3.125 and aldactone 25 Daily for HF management     Respiratory: Saturating well on RA   - Wean off O2 sat > 92%  - IS and ambulation  - Chest PT.     GI: Needs BM   - GI PPX   - Bowel regimen with Senna and Miralax   - Gave Dulcolax PO tomorrow    : BUN/ Creatinine. 1.43 Creatinine worsening. Bicarb 38, Hyponatremia improved to 132 from 128.   - monitor I/Os.   - Diamox x1.   - Continuing Lasix 40 BID     Endo: FS well contolled.   - ISS     ID: WBC 11, afebrie   - completed periop ABX   - continue to monitor fever curve     Heme: DVT PPX   - SQH     Soniya Dumont PA-C

## 2022-12-03 NOTE — PROGRESS NOTE ADULT - SUBJECTIVE AND OBJECTIVE BOX
Patient discussed on morning rounds with Dr. Reilly    Operation / Date: Mv Repair CABG x1 Biatrial Cryomaze EF 40%.     SUBJECTIVE ASSESSMENT:  62y Male reports that he is passing gas but no BM. No other issues at this time.         Vital Signs Last 24 Hrs  T(C): 37.5 (03 Dec 2022 17:28), Max: 37.5 (03 Dec 2022 17:28)  T(F): 99.5 (03 Dec 2022 17:28), Max: 99.5 (03 Dec 2022 17:28)  HR: 78 (03 Dec 2022 16:50) (70 - 80)  BP: 116/66 (03 Dec 2022 16:50) (104/56 - 122/57)  BP(mean): 83 (03 Dec 2022 16:50) (74 - 83)  RR: 20 (03 Dec 2022 12:20) (20 - 22)  SpO2: 95% (03 Dec 2022 16:50) (92% - 95%)    Parameters below as of 03 Dec 2022 16:50  Patient On (Oxygen Delivery Method): nasal cannula w/ humidification  O2 Flow (L/min): 2    I&O's Detail    02 Dec 2022 07:01  -  03 Dec 2022 07:00  --------------------------------------------------------  IN:    Oral Fluid: 1220 mL  Total IN: 1220 mL    OUT:    Indwelling Catheter - Urethral (mL): 350 mL    Voided (mL): 3115 mL  Total OUT: 3465 mL    Total NET: -2245 mL      03 Dec 2022 07:01  -  03 Dec 2022 18:32  --------------------------------------------------------  IN:    Oral Fluid: 600 mL  Total IN: 600 mL    OUT:    Voided (mL): 900 mL  Total OUT: 900 mL    Total NET: -300 mL          CHEST TUBE:  No.   JUSTUS DRAIN:  No.  EPICARDIAL WIRES: Yes  TIE DOWNS: Yes.  HUERTA: No.    PHYSICAL EXAM:    GEN: NAD, looks comfortable  Psych: Mood appropriate  Neuro: A&Ox3.  No focal deficits.  Moving all extremities.   HEENT: No obvious abnormalities  CV: S1S2, regular, no murmurs appreciated.  No carotid bruits.  No JVD  Lungs: Clear B/L.  No wheezing, rales or rhonchi  ABD: Soft, non-tender,+ distended.  +Bowel sounds  EXT: Warm and well perfused.  No peripheral edema noted  Musculoskeletal: Moving all extremities with normal ROM, no joint swelling  PV: Pedal pulses palpable  Incision: midsternal incision CDI, no drainage or erythema, stable sternum    Incision Sites:    LABS:                        11.7   10.64 )-----------( 245      ( 03 Dec 2022 09:21 )             34.8       COUMADIN:  No. REASON: .    PT/INR - ( 03 Dec 2022 09:21 )   PT: 14.0 sec;   INR: 1.17          PTT - ( 03 Dec 2022 09:21 )  PTT:31.8 sec    12-03    132<L>  |  84<L>  |  21  ----------------------------<  114<H>  3.4<L>   |  38<H>  |  1.43<H>    Ca    9.3      03 Dec 2022 09:21  Phos  3.4     12-03  Mg     2.2     12-03    TPro  7.5  /  Alb  4.1  /  TBili  1.0  /  DBili  x   /  AST  48<H>  /  ALT  28  /  AlkPhos  83  12-03          MEDICATIONS  (STANDING):  ALPRAZolam 0.5 milliGRAM(s) Oral at bedtime  aMIOdarone    Tablet 400 milliGRAM(s) Oral two times a day  aspirin enteric coated 81 milliGRAM(s) Oral daily  atorvastatin 80 milliGRAM(s) Oral at bedtime  carvedilol 3.125 milliGRAM(s) Oral every 12 hours  chlorhexidine 2% Cloths 1 Application(s) Topical daily  furosemide    Tablet 40 milliGRAM(s) Oral every 12 hours  heparin   Injectable 7500 Unit(s) SubCutaneous every 8 hours  pantoprazole    Tablet 40 milliGRAM(s) Oral daily  polyethylene glycol 3350 17 Gram(s) Oral daily  sodium chloride 0.9% lock flush 3 milliLiter(s) IV Push every 8 hours  spironolactone 25 milliGRAM(s) Oral daily    MEDICATIONS  (PRN):  oxyCODONE    IR 15 milliGRAM(s) Oral every 4 hours PRN Moderate Pain (4 - 6)        RADIOLOGY & ADDITIONAL TESTS:  < from: Xray Chest 1 View- PORTABLE-Routine (Xray Chest 1 View- PORTABLE-Routine in AM.) (12.02.22 @ 05:40) >    Findings/  impression: Stable positioning of support device. Bibasilar   opacities/pleural effusions, unchanged. Stable cardiomegaly, status post   median sternotomy, left atrial appendage clip, mitral valve replacement.   Stable bony structures.    --- End ofReport ---    < end of copied text >

## 2022-12-04 LAB
ALBUMIN SERPL ELPH-MCNC: 3.7 G/DL — SIGNIFICANT CHANGE UP (ref 3.3–5)
ALP SERPL-CCNC: 81 U/L — SIGNIFICANT CHANGE UP (ref 40–120)
ALT FLD-CCNC: 31 U/L — SIGNIFICANT CHANGE UP (ref 10–45)
ANION GAP SERPL CALC-SCNC: 13 MMOL/L — SIGNIFICANT CHANGE UP (ref 5–17)
AST SERPL-CCNC: 44 U/L — HIGH (ref 10–40)
BILIRUB SERPL-MCNC: 0.8 MG/DL — SIGNIFICANT CHANGE UP (ref 0.2–1.2)
BUN SERPL-MCNC: 24 MG/DL — HIGH (ref 7–23)
CALCIUM SERPL-MCNC: 9.3 MG/DL — SIGNIFICANT CHANGE UP (ref 8.4–10.5)
CHLORIDE SERPL-SCNC: 87 MMOL/L — LOW (ref 96–108)
CO2 SERPL-SCNC: 33 MMOL/L — HIGH (ref 22–31)
CREAT SERPL-MCNC: 1.48 MG/DL — HIGH (ref 0.5–1.3)
EGFR: 53 ML/MIN/1.73M2 — LOW
GLUCOSE SERPL-MCNC: 126 MG/DL — HIGH (ref 70–99)
HCT VFR BLD CALC: 34.7 % — LOW (ref 39–50)
HGB BLD-MCNC: 11.7 G/DL — LOW (ref 13–17)
MAGNESIUM SERPL-MCNC: 2.4 MG/DL — SIGNIFICANT CHANGE UP (ref 1.6–2.6)
MCHC RBC-ENTMCNC: 32.8 PG — SIGNIFICANT CHANGE UP (ref 27–34)
MCHC RBC-ENTMCNC: 33.7 GM/DL — SIGNIFICANT CHANGE UP (ref 32–36)
MCV RBC AUTO: 97.2 FL — SIGNIFICANT CHANGE UP (ref 80–100)
NRBC # BLD: 0 /100 WBCS — SIGNIFICANT CHANGE UP (ref 0–0)
PHOSPHATE SERPL-MCNC: 4.8 MG/DL — HIGH (ref 2.5–4.5)
PLATELET # BLD AUTO: 257 K/UL — SIGNIFICANT CHANGE UP (ref 150–400)
POTASSIUM SERPL-MCNC: 3.5 MMOL/L — SIGNIFICANT CHANGE UP (ref 3.5–5.3)
POTASSIUM SERPL-SCNC: 3.5 MMOL/L — SIGNIFICANT CHANGE UP (ref 3.5–5.3)
PROT SERPL-MCNC: 7.1 G/DL — SIGNIFICANT CHANGE UP (ref 6–8.3)
RBC # BLD: 3.57 M/UL — LOW (ref 4.2–5.8)
RBC # FLD: 13.4 % — SIGNIFICANT CHANGE UP (ref 10.3–14.5)
SODIUM SERPL-SCNC: 133 MMOL/L — LOW (ref 135–145)
WBC # BLD: 9.18 K/UL — SIGNIFICANT CHANGE UP (ref 3.8–10.5)
WBC # FLD AUTO: 9.18 K/UL — SIGNIFICANT CHANGE UP (ref 3.8–10.5)

## 2022-12-04 PROCEDURE — 71045 X-RAY EXAM CHEST 1 VIEW: CPT | Mod: 26

## 2022-12-04 RX ORDER — FUROSEMIDE 40 MG
40 TABLET ORAL DAILY
Refills: 0 | Status: DISCONTINUED | OUTPATIENT
Start: 2022-12-04 | End: 2022-12-06

## 2022-12-04 RX ORDER — SPIRONOLACTONE 25 MG/1
12.5 TABLET, FILM COATED ORAL DAILY
Refills: 0 | Status: DISCONTINUED | OUTPATIENT
Start: 2022-12-04 | End: 2022-12-07

## 2022-12-04 RX ORDER — POTASSIUM CHLORIDE 20 MEQ
20 PACKET (EA) ORAL DAILY
Refills: 0 | Status: DISCONTINUED | OUTPATIENT
Start: 2022-12-05 | End: 2022-12-06

## 2022-12-04 RX ORDER — AMIODARONE HYDROCHLORIDE 400 MG/1
200 TABLET ORAL DAILY
Refills: 0 | Status: DISCONTINUED | OUTPATIENT
Start: 2022-12-04 | End: 2022-12-04

## 2022-12-04 RX ORDER — POTASSIUM CHLORIDE 20 MEQ
40 PACKET (EA) ORAL ONCE
Refills: 0 | Status: COMPLETED | OUTPATIENT
Start: 2022-12-04 | End: 2022-12-04

## 2022-12-04 RX ADMIN — PANTOPRAZOLE SODIUM 40 MILLIGRAM(S): 20 TABLET, DELAYED RELEASE ORAL at 12:06

## 2022-12-04 RX ADMIN — SPIRONOLACTONE 12.5 MILLIGRAM(S): 25 TABLET, FILM COATED ORAL at 10:59

## 2022-12-04 RX ADMIN — HEPARIN SODIUM 7500 UNIT(S): 5000 INJECTION INTRAVENOUS; SUBCUTANEOUS at 06:17

## 2022-12-04 RX ADMIN — POLYETHYLENE GLYCOL 3350 17 GRAM(S): 17 POWDER, FOR SOLUTION ORAL at 12:07

## 2022-12-04 RX ADMIN — SODIUM CHLORIDE 3 MILLILITER(S): 9 INJECTION INTRAMUSCULAR; INTRAVENOUS; SUBCUTANEOUS at 06:14

## 2022-12-04 RX ADMIN — SODIUM CHLORIDE 3 MILLILITER(S): 9 INJECTION INTRAMUSCULAR; INTRAVENOUS; SUBCUTANEOUS at 14:10

## 2022-12-04 RX ADMIN — CHLORHEXIDINE GLUCONATE 1 APPLICATION(S): 213 SOLUTION TOPICAL at 12:07

## 2022-12-04 RX ADMIN — Medication 40 MILLIEQUIVALENT(S): at 19:40

## 2022-12-04 RX ADMIN — SODIUM CHLORIDE 3 MILLILITER(S): 9 INJECTION INTRAMUSCULAR; INTRAVENOUS; SUBCUTANEOUS at 21:50

## 2022-12-04 RX ADMIN — Medication 81 MILLIGRAM(S): at 12:06

## 2022-12-04 RX ADMIN — Medication 40 MILLIGRAM(S): at 10:56

## 2022-12-04 RX ADMIN — HEPARIN SODIUM 7500 UNIT(S): 5000 INJECTION INTRAVENOUS; SUBCUTANEOUS at 14:19

## 2022-12-04 RX ADMIN — HEPARIN SODIUM 7500 UNIT(S): 5000 INJECTION INTRAVENOUS; SUBCUTANEOUS at 21:50

## 2022-12-04 RX ADMIN — ATORVASTATIN CALCIUM 80 MILLIGRAM(S): 80 TABLET, FILM COATED ORAL at 21:49

## 2022-12-04 RX ADMIN — Medication 0.5 MILLIGRAM(S): at 21:50

## 2022-12-04 NOTE — PROGRESS NOTE ADULT - SUBJECTIVE AND OBJECTIVE BOX
Patient discussed on morning rounds with       Operation / Date:     SUBJECTIVE ASSESSMENT:  62y Male reports feeling a little lightheaded this am. In afternoon felt better. Passing gas. Had a very small unsatisfying" BM last night. Offers no other complaints at this time,          Vital Signs Last 24 Hrs  T(C): 35.5 (04 Dec 2022 17:52), Max: 36.2 (04 Dec 2022 01:01)  T(F): 95.9 (04 Dec 2022 17:52), Max: 97.2 (04 Dec 2022 01:01)  HR: 80 (04 Dec 2022 18:02) (58 - 80)  BP: 135/69 (04 Dec 2022 18:02) (98/55 - 135/69)  BP(mean): 88 (04 Dec 2022 18:02) (68 - 88)  RR: 15 (04 Dec 2022 18:02) (15 - 19)  SpO2: 94% (04 Dec 2022 18:02) (93% - 96%)    Parameters below as of 04 Dec 2022 18:02  Patient On (Oxygen Delivery Method): room air      I&O's Detail    03 Dec 2022 07:01  -  04 Dec 2022 07:00  --------------------------------------------------------  IN:    Oral Fluid: 840 mL  Total IN: 840 mL    OUT:    Voided (mL): 3250 mL  Total OUT: 3250 mL    Total NET: -2410 mL      04 Dec 2022 07:01  -  04 Dec 2022 18:50  --------------------------------------------------------  IN:    Oral Fluid: 780 mL  Total IN: 780 mL    OUT:    Voided (mL): 825 mL  Total OUT: 825 mL    Total NET: -45 mL    CHEST TUBE:  No.   JUSTUS DRAIN:  No.  EPICARDIAL WIRES: Yes  TIE DOWNS: Yes.  HUERTA: No.    PHYSICAL EXAM:    GEN: NAD, looks comfortable  Psych: Mood appropriate  Neuro: A&Ox3.  No focal deficits.  Moving all extremities.   HEENT: No obvious abnormalities  CV: S1S2, regular, no murmurs appreciated.  No carotid bruits.  No JVD  Lungs: Clear B/L.  No wheezing, rales or rhonchi  ABD: Soft, non-tender,+ distended.  +Bowel sounds  EXT: Warm and well perfused.  No peripheral edema noted  Musculoskeletal: Moving all extremities with normal ROM, no joint swelling  PV: Pedal pulses palpable  Incision: midsternal incision CDI, no drainage or erythema, stable sternum    LABS:                        11.7   9.18  )-----------( 257      ( 04 Dec 2022 05:30 )             34.7       COUMADIN:  No    PT/INR - ( 03 Dec 2022 09:21 )   PT: 14.0 sec;   INR: 1.17          PTT - ( 03 Dec 2022 09:21 )  PTT:31.8 sec    12-04    133<L>  |  87<L>  |  24<H>  ----------------------------<  126<H>  3.5   |  33<H>  |  1.48<H>    Ca    9.3      04 Dec 2022 05:30  Phos  4.8     12-04  Mg     2.4     12-04    TPro  7.1  /  Alb  3.7  /  TBili  0.8  /  DBili  x   /  AST  44<H>  /  ALT  31  /  AlkPhos  81  12-04          MEDICATIONS  (STANDING):  ALPRAZolam 0.5 milliGRAM(s) Oral at bedtime  aspirin enteric coated 81 milliGRAM(s) Oral daily  atorvastatin 80 milliGRAM(s) Oral at bedtime  chlorhexidine 2% Cloths 1 Application(s) Topical daily  furosemide    Tablet 40 milliGRAM(s) Oral daily  heparin   Injectable 7500 Unit(s) SubCutaneous every 8 hours  pantoprazole    Tablet 40 milliGRAM(s) Oral daily  polyethylene glycol 3350 17 Gram(s) Oral daily  sodium chloride 0.9% lock flush 3 milliLiter(s) IV Push every 8 hours  spironolactone 12.5 milliGRAM(s) Oral daily    MEDICATIONS  (PRN):  oxyCODONE    IR 15 milliGRAM(s) Oral every 4 hours PRN Moderate Pain (4 - 6)        RADIOLOGY & ADDITIONAL TESTS:

## 2022-12-04 NOTE — PROGRESS NOTE ADULT - ASSESSMENT
63 y/o male PMH anxiety, morbid obesity, "fast heart rate" (not on blood thinners) who c/o SOB, N&V, night sweats and  palpitations for the last few days which progressively worsened. Patient was brought by EMS to [facility] and found to be in rapid atrial fibrillation -200, HTN with expiratory wheezing. Given Cardizem and albuterol with improvement. Placed on heparin drip at that time and given lasix after CXR showed pulmonary edema. Troponin found to be elevated, ruled in for NSTEMI. Cardiac cath completed and showed 2 vessel CAD mLAD 60% mCX 50% with EF 40% and medical management recommended. OLEG at OSH showed severe central MR. Patient was transferred to [facility] under the care of Dr. Reilly for surgical evaluation of his MR. Heparin gtt started, IV lasix started, amio loaded. TTE with EF 30%. Creatinine increased, IV lasix held. On 22 patient underwent MV repair, CABGx1, biatrial cryomaze EF 40%.  Patient arrived to CTICU on Levo, vaso, , sinus danny/junctional requiring pacing, received no products intraop.  POD 1 Extubated to Bipap.  POD 2 Weaned off vaso/levo.  POD 3 CT removed,  weaning down.  POD 4  weaned off, transferred to [facility]. POD 5 Hyponatremia improving. Diamox given for urine alkalization. Continuing in slow afib in the 40-50s V paced at 80. POD#6 Pace maker turned down and patient's blood pressure did not tolerate the slow rate. Returned to VVI 80.       Neuro: pain management   - Oxy PRN   - Xanax prn at bedtime.    CVS: MVr CABG x1   - Continue ASA, Statin for CAD   - Amio on hold for afib in the 40-50s.   - continuing coreg 3.125 and aldactone 25 Daily for HF management     Respiratory: Saturating well on RA   - Wean off O2 sat > 92%  - IS and ambulation  - Chest PT.     GI: Needs BM   - GI PPX   - Bowel regimen with Senna and Miralax   - Gave Dulcolax PO tomorrow    : BUN/ Creatinine. 21/1.43 Creatinine worsening. Bicarb 38, Hyponatremia improved to 132 from 128.   - monitor I/Os.   - Diamox x1.   - Continuing Lasix 40 BID     Endo: FS well contolled.   - ISS     ID: WBC 11, afebrile   - completed periop ABX   - continue to monitor fever curve     Heme: DVT PPX   - SQH     Soniya Dumont PA-C     63 y/o male PMH anxiety, morbid obesity, "fast heart rate" (not on blood thinners) who c/o SOB, N&V, night sweats and  palpitations for the last few days which progressively worsened. Patient was brought by EMS to [facility] and found to be in rapid atrial fibrillation -200, HTN with expiratory wheezing. Given Cardizem and albuterol with improvement. Placed on heparin drip at that time and given lasix after CXR showed pulmonary edema. Troponin found to be elevated, ruled in for NSTEMI. Cardiac cath completed and showed 2 vessel CAD mLAD 60% mCX 50% with EF 40% and medical management recommended. OLEG at OSH showed severe central MR. Patient was transferred to [facility] under the care of Dr. Reilly for surgical evaluation of his MR. Heparin gtt started, IV lasix started, amio loaded. TTE with EF 30%. Creatinine increased, IV lasix held. On 22 patient underwent MV repair, CABGx1, biatrial cryomaze EF 40%.  Patient arrived to CTICU on Levo, vaso, , sinus danny/junctional requiring pacing, received no products intraop.  POD 1 Extubated to Bipap.  POD 2 Weaned off vaso/levo.  POD 3 CT removed,  weaning down.  POD 4  weaned off, transferred to [facility]. POD 5 Hyponatremia improving. Diamox given for urine alkalization. Continuing in slow afib in the 40-50s V paced at 80. POD#6 Pace maker turned down and patient's blood pressure did not tolerate the slow rate. Returned to VVI 80.       Neuro: pain management   - Oxy PRN   - Xanax prn at bedtime.    CVS: MVr CABG x1   - Continue ASA, Statin for CAD   - Amio on hold for afib in the 40-50s.   -Aldactone decreased to 12.5 Daily for HF management   - consider EPS tomorrow if rhythm does improve     Respiratory: Saturating well on RA   - Wean off O2 sat > 92%  - IS and ambulation  - Chest PT.     GI: Needs BM   - GI PPX   - Bowel regimen with Senna and Miralax   - Gave Dulcolax PO     : BUN/ Creatinine. 21/1.43 Creatinine worsening. Bicarb improved to 33., Hyponatremia improved to 133 from 128.   - monitor I/Os.   - Decreased Lasix 40 PO Daily     Endo: FS well contolled.   - ISS     ID: WBC 9, afebrile   - completed periop ABX   - continue to monitor fever curve     Heme: DVT PPX   - SQH   - consider AC    Soniya Dumont PA-C

## 2022-12-05 LAB
ALBUMIN SERPL ELPH-MCNC: 3.7 G/DL — SIGNIFICANT CHANGE UP (ref 3.3–5)
ALP SERPL-CCNC: 92 U/L — SIGNIFICANT CHANGE UP (ref 40–120)
ALT FLD-CCNC: 35 U/L — SIGNIFICANT CHANGE UP (ref 10–45)
ANION GAP SERPL CALC-SCNC: 12 MMOL/L — SIGNIFICANT CHANGE UP (ref 5–17)
AST SERPL-CCNC: 40 U/L — SIGNIFICANT CHANGE UP (ref 10–40)
BILIRUB SERPL-MCNC: 1 MG/DL — SIGNIFICANT CHANGE UP (ref 0.2–1.2)
BUN SERPL-MCNC: 25 MG/DL — HIGH (ref 7–23)
CALCIUM SERPL-MCNC: 9.5 MG/DL — SIGNIFICANT CHANGE UP (ref 8.4–10.5)
CHLORIDE SERPL-SCNC: 89 MMOL/L — LOW (ref 96–108)
CO2 SERPL-SCNC: 29 MMOL/L — SIGNIFICANT CHANGE UP (ref 22–31)
CREAT SERPL-MCNC: 1.57 MG/DL — HIGH (ref 0.5–1.3)
EGFR: 49 ML/MIN/1.73M2 — LOW
GLUCOSE SERPL-MCNC: 140 MG/DL — HIGH (ref 70–99)
HCT VFR BLD CALC: 36.5 % — LOW (ref 39–50)
HGB BLD-MCNC: 12.4 G/DL — LOW (ref 13–17)
MAGNESIUM SERPL-MCNC: 2.5 MG/DL — SIGNIFICANT CHANGE UP (ref 1.6–2.6)
MCHC RBC-ENTMCNC: 32.7 PG — SIGNIFICANT CHANGE UP (ref 27–34)
MCHC RBC-ENTMCNC: 34 GM/DL — SIGNIFICANT CHANGE UP (ref 32–36)
MCV RBC AUTO: 96.3 FL — SIGNIFICANT CHANGE UP (ref 80–100)
NRBC # BLD: 0 /100 WBCS — SIGNIFICANT CHANGE UP (ref 0–0)
PHOSPHATE SERPL-MCNC: 4.7 MG/DL — HIGH (ref 2.5–4.5)
PLATELET # BLD AUTO: 319 K/UL — SIGNIFICANT CHANGE UP (ref 150–400)
POTASSIUM SERPL-MCNC: 4.1 MMOL/L — SIGNIFICANT CHANGE UP (ref 3.5–5.3)
POTASSIUM SERPL-SCNC: 4.1 MMOL/L — SIGNIFICANT CHANGE UP (ref 3.5–5.3)
PROT SERPL-MCNC: 7.5 G/DL — SIGNIFICANT CHANGE UP (ref 6–8.3)
RBC # BLD: 3.79 M/UL — LOW (ref 4.2–5.8)
RBC # FLD: 13.8 % — SIGNIFICANT CHANGE UP (ref 10.3–14.5)
SODIUM SERPL-SCNC: 130 MMOL/L — LOW (ref 135–145)
WBC # BLD: 9.93 K/UL — SIGNIFICANT CHANGE UP (ref 3.8–10.5)
WBC # FLD AUTO: 9.93 K/UL — SIGNIFICANT CHANGE UP (ref 3.8–10.5)

## 2022-12-05 PROCEDURE — 71045 X-RAY EXAM CHEST 1 VIEW: CPT | Mod: 26

## 2022-12-05 PROCEDURE — 99223 1ST HOSP IP/OBS HIGH 75: CPT

## 2022-12-05 PROCEDURE — 93010 ELECTROCARDIOGRAM REPORT: CPT

## 2022-12-05 RX ADMIN — POLYETHYLENE GLYCOL 3350 17 GRAM(S): 17 POWDER, FOR SOLUTION ORAL at 12:08

## 2022-12-05 RX ADMIN — SODIUM CHLORIDE 3 MILLILITER(S): 9 INJECTION INTRAMUSCULAR; INTRAVENOUS; SUBCUTANEOUS at 05:16

## 2022-12-05 RX ADMIN — OXYCODONE HYDROCHLORIDE 15 MILLIGRAM(S): 5 TABLET ORAL at 00:03

## 2022-12-05 RX ADMIN — SPIRONOLACTONE 12.5 MILLIGRAM(S): 25 TABLET, FILM COATED ORAL at 05:50

## 2022-12-05 RX ADMIN — SODIUM CHLORIDE 3 MILLILITER(S): 9 INJECTION INTRAMUSCULAR; INTRAVENOUS; SUBCUTANEOUS at 21:24

## 2022-12-05 RX ADMIN — Medication 20 MILLIEQUIVALENT(S): at 12:08

## 2022-12-05 RX ADMIN — CHLORHEXIDINE GLUCONATE 1 APPLICATION(S): 213 SOLUTION TOPICAL at 12:11

## 2022-12-05 RX ADMIN — ATORVASTATIN CALCIUM 80 MILLIGRAM(S): 80 TABLET, FILM COATED ORAL at 21:46

## 2022-12-05 RX ADMIN — Medication 81 MILLIGRAM(S): at 12:08

## 2022-12-05 RX ADMIN — SODIUM CHLORIDE 3 MILLILITER(S): 9 INJECTION INTRAMUSCULAR; INTRAVENOUS; SUBCUTANEOUS at 13:43

## 2022-12-05 RX ADMIN — PANTOPRAZOLE SODIUM 40 MILLIGRAM(S): 20 TABLET, DELAYED RELEASE ORAL at 12:08

## 2022-12-05 RX ADMIN — HEPARIN SODIUM 7500 UNIT(S): 5000 INJECTION INTRAVENOUS; SUBCUTANEOUS at 21:45

## 2022-12-05 RX ADMIN — OXYCODONE HYDROCHLORIDE 15 MILLIGRAM(S): 5 TABLET ORAL at 00:59

## 2022-12-05 RX ADMIN — Medication 0.5 MILLIGRAM(S): at 21:45

## 2022-12-05 RX ADMIN — HEPARIN SODIUM 7500 UNIT(S): 5000 INJECTION INTRAVENOUS; SUBCUTANEOUS at 13:49

## 2022-12-05 RX ADMIN — Medication 40 MILLIGRAM(S): at 05:50

## 2022-12-05 RX ADMIN — HEPARIN SODIUM 7500 UNIT(S): 5000 INJECTION INTRAVENOUS; SUBCUTANEOUS at 05:50

## 2022-12-05 NOTE — PROGRESS NOTE ADULT - SUBJECTIVE AND OBJECTIVE BOX
Patient discussed on morning rounds with Dr. Reilly     OPERATION & DATE: 11/28 MV repair, CABG x1 LIMA-LAD, CHANDRIKA clip, biatrial cryomaze    SUBJECTIVE ASSESSMENT: Pt feeling well, reports some incisional pain which tends to be worse at night but was well controlled last night with pain regimen. Pt denies SOB. Ambulating and tolerating PO without difficulty.     VITAL SIGNS:  Vital Signs Last 24 Hrs  T(C): 36.2 (05 Dec 2022 09:40), Max: 36.9 (04 Dec 2022 21:04)  T(F): 97.1 (05 Dec 2022 09:40), Max: 98.4 (04 Dec 2022 21:04)  HR: 80 (05 Dec 2022 08:45) (78 - 80)  BP: 127/82 (05 Dec 2022 08:45) (104/58 - 135/69)  BP(mean): 101 (05 Dec 2022 08:45) (74 - 101)  RR: 18 (05 Dec 2022 05:05) (15 - 18)  SpO2: 94% (05 Dec 2022 08:45) (92% - 94%)    Parameters below as of 05 Dec 2022 05:05  Patient On (Oxygen Delivery Method): room air      I&O's Detail    04 Dec 2022 07:01  -  05 Dec 2022 07:00  --------------------------------------------------------  IN:    Oral Fluid: 1260 mL  Total IN: 1260 mL    OUT:    Voided (mL): 3125 mL  Total OUT: 3125 mL    Total NET: -1865 mL      05 Dec 2022 07:01  -  05 Dec 2022 11:13  --------------------------------------------------------  IN:    Oral Fluid: 480 mL  Total IN: 480 mL    OUT:    Voided (mL): 300 mL  Total OUT: 300 mL    Total NET: 180 mL        CHEST TUBE:  none  JUSTUS DRAIN:  none  EPICARDIAL WIRES: in place, pacing  STITCHES: 1 tie down in place  STAPLES: none  HUERTA: none  CENTRAL LINE: none  MIDLINE/PICC: none  WOUND VAC: none    PHYSICAL EXAM:  General: resting comfortably in chair in NAD  Neurological: AOx3. Motor skills grossly intact  Cardiovascular: Pacing at 80 BPM.   Respiratory: Lungs CTA bilaterally. No wheezing or rales  Gastrointestinal: +BS in all 4 quadrants. Non-distended. Soft. Non-tender  Extremities: Strength 5/5 b/l upper/lower extremities. Sensation grossly intact upper/lower extremities. No edema. No calf tenderness.  Vascular: Radial 2+bilaterally, DP 2+ b/l  Incision Sites: sternal incision healing well. no erythema or wound dehiscence.       LABS:                        12.4   9.93  )-----------( 319      ( 05 Dec 2022 07:04 )             36.5       12-05    130<L>  |  89<L>  |  25<H>  ----------------------------<  140<H>  4.1   |  29  |  1.57<H>    Ca    9.5      05 Dec 2022 07:04  Phos  4.7     12-05  Mg     2.5     12-05    TPro  7.5  /  Alb  3.7  /  TBili  1.0  /  DBili  x   /  AST  40  /  ALT  35  /  AlkPhos  92  12-05      MEDICATIONS  (STANDING):  ALPRAZolam 0.5 milliGRAM(s) Oral at bedtime  aspirin enteric coated 81 milliGRAM(s) Oral daily  atorvastatin 80 milliGRAM(s) Oral at bedtime  chlorhexidine 2% Cloths 1 Application(s) Topical daily  furosemide    Tablet 40 milliGRAM(s) Oral daily  heparin   Injectable 7500 Unit(s) SubCutaneous every 8 hours  pantoprazole    Tablet 40 milliGRAM(s) Oral daily  polyethylene glycol 3350 17 Gram(s) Oral daily  potassium chloride    Tablet ER 20 milliEquivalent(s) Oral daily  sodium chloride 0.9% lock flush 3 milliLiter(s) IV Push every 8 hours  spironolactone 12.5 milliGRAM(s) Oral daily    MEDICATIONS  (PRN):  oxyCODONE    IR 15 milliGRAM(s) Oral every 4 hours PRN Moderate Pain (4 - 6)    RADIOLOGY & ADDITIONAL TESTS:    12/5 L sided pleural effusion improved from yesterday

## 2022-12-05 NOTE — CONSULT NOTE ADULT - SUBJECTIVE AND OBJECTIVE BOX
HPI:    62 year old male with morbid obesity, who presented to [facility] with rapid AF and shortness of breath, found to have pulmonary edema, EF was 40%. Had C showing CAD- 60% mLAD +50% LCx stenosis, with a OLEG showing severe MR, transferred to [facility] for management. On 11/28 pt had MV repair       PAST MEDICAL & SURGICAL HISTORY:  Morbid obesity  Rapid palpitations  Anxiety    No significant past surgical history    No pertinent family history in first degree relatives    Social History:no smoking, no drugs, no alcohol    pertinent home medications:    Inpatient Medications:   ALPRAZolam 0.5 milliGRAM(s) Oral at bedtime  aspirin enteric coated 81 milliGRAM(s) Oral daily  atorvastatin 80 milliGRAM(s) Oral at bedtime  chlorhexidine 2% Cloths 1 Application(s) Topical daily  furosemide    Tablet 40 milliGRAM(s) Oral daily  heparin   Injectable 7500 Unit(s) SubCutaneous every 8 hours  oxyCODONE    IR 15 milliGRAM(s) Oral every 4 hours PRN  pantoprazole    Tablet 40 milliGRAM(s) Oral daily  polyethylene glycol 3350 17 Gram(s) Oral daily  potassium chloride    Tablet ER 20 milliEquivalent(s) Oral daily  sodium chloride 0.9% lock flush 3 milliLiter(s) IV Push every 8 hours  spironolactone 12.5 milliGRAM(s) Oral daily      Allergies: No Known Allergies      ROS:   CONSTITUTIONAL: No fever, weight loss + fatigue  EYES: Pt denies  RESPIRATORY: No cough, wheezing, chills or hemoptysis; No Shortness of Breath  CARDIOVASCULAR: see HPI  GASTROINTESTINAL: Pt denies  NEUROLOGICAL: Pt denies  SKIN: Pt denies   PSYCHIATRIC: Pt denies  HEME/LYMPH: Pt denies    PHYSICAL:  T(C): 36.2 (12-05-22 @ 09:40), Max: 36.9 (12-04-22 @ 21:04)  HR: 66 (12-05-22 @ 12:08) (66 - 80)  BP: 133/61 (12-05-22 @ 12:08) (104/58 - 135/69)  RR: 18 (12-05-22 @ 12:08) (15 - 18)  SpO2: 94% (12-05-22 @ 12:08) (92% - 94%)  Wt(kg): --  Appearance: No acute distress, well developed  Eyes: normal appearing conjunctiva, pupils and eyelids  Cardiovascular: Normal S1 S2, No JVD, No murmurs, No edema  Respiratory: Lungs clear to auscultation	bilaterally.  No wheeze, rhonchi, rales noted  Gastrointestinal:  Soft, NT/ND 	  Neurologic:  No deficit noted  Psych: A&Ox3, normal mood/affect  Musculoskeletal: normal gait  Skin: no rash noted, normal color and pigmentation.        LABS:                        12.4   9.93  )-----------( 319      ( 05 Dec 2022 07:04 )             36.5     12-05    130<L>  |  89<L>  |  25<H>  ----------------------------<  140<H>  4.1   |  29  |  1.57<H>    Ca    9.5      05 Dec 2022 07:04  Phos  4.7     12-05  Mg     2.5     12-05    TPro  7.5  /  Alb  3.7  /  TBili  1.0  /  DBili  x   /  AST  40  /  ALT  35  /  AlkPhos  92  12-05      TSH  Troponin    EKG:    Telemetry:    ECHO:    Prior EP procedures:    Cath / stress / Cardiac CTa:    Assessment Plan:         HPI:    62 year old male with morbid obesity, who presented to [facility] with rapid AF and shortness of breath, found to have pulmonary edema, EF was 40%. Had [facility] showing CAD- 60% mLAD +50% LCx stenosis, with a OLEG showing severe MR, transferred to [facility] for management. On 11/28 pt had MV repair, CABG x1, bi-atrail cryomaze and CHANRDIKA ligation. Post-procedure per notes patient was having sinus bradycardia requiring pacing (no strips). Pt still has epicardial wire in place. EP consulted for bradycardia and AF.    At bedside, temporary pacemaker dropped to VVI 50 as the patient was set to VVI at 80 and was competing with the device because the underlying rhythm was AFL @80. Pt also having AF in the 70s (alternating between fib and flutter) BP has been stable without pacing at 80 with his native rhythm.    Pt feeling well, no complaints. Did not notice a change in how he felt with TVP rate dropped to 50. Anxious to get home        PAST MEDICAL & SURGICAL HISTORY:  Morbid obesity  Rapid palpitations  Anxiety    No significant past surgical history    No pertinent family history in first degree relatives    Social History:no smoking, no drugs, no alcohol    pertinent home medications:    Inpatient Medications:   ALPRAZolam 0.5 milliGRAM(s) Oral at bedtime  aspirin enteric coated 81 milliGRAM(s) Oral daily  atorvastatin 80 milliGRAM(s) Oral at bedtime  chlorhexidine 2% Cloths 1 Application(s) Topical daily  furosemide    Tablet 40 milliGRAM(s) Oral daily  heparin   Injectable 7500 Unit(s) SubCutaneous every 8 hours  oxyCODONE    IR 15 milliGRAM(s) Oral every 4 hours PRN  pantoprazole    Tablet 40 milliGRAM(s) Oral daily  polyethylene glycol 3350 17 Gram(s) Oral daily  potassium chloride    Tablet ER 20 milliEquivalent(s) Oral daily  sodium chloride 0.9% lock flush 3 milliLiter(s) IV Push every 8 hours  spironolactone 12.5 milliGRAM(s) Oral daily      Allergies: No Known Allergies      ROS:   CONSTITUTIONAL: No fever, weight loss + fatigue  EYES: Pt denies  RESPIRATORY: No cough, wheezing, chills or hemoptysis; No Shortness of Breath  CARDIOVASCULAR: see HPI  GASTROINTESTINAL: Pt denies  NEUROLOGICAL: Pt denies  SKIN: Pt denies   PSYCHIATRIC: Pt denies  HEME/LYMPH: Pt denies    PHYSICAL:  T(C): 36.2 (12-05-22 @ 09:40), Max: 36.9 (12-04-22 @ 21:04)  HR: 66 (12-05-22 @ 12:08) (66 - 80)  BP: 133/61 (12-05-22 @ 12:08) (104/58 - 135/69)  RR: 18 (12-05-22 @ 12:08) (15 - 18)  SpO2: 94% (12-05-22 @ 12:08) (92% - 94%)  Appearance: No acute distress, well developed  Eyes: normal appearing conjunctiva, pupils and eyelids  Cardiovascular: irregularly irregular, (+) sternotomy.  Respiratory: Lungs clear to auscultation	bilaterally.  No wheeze, rhonchi, rales noted  Gastrointestinal:  Soft, NT/ND 	  Neurologic:  No deficit noted  Psych: A&Ox3, normal mood/affect  Musculoskeletal: normal gait  Skin: no rash noted, normal color and pigmentation.        LABS:                        12.4   9.93  )-----------( 319      ( 05 Dec 2022 07:04 )             36.5     12-05    130<L>  |  89<L>  |  25<H>  ----------------------------<  140<H>  4.1   |  29  |  1.57<H>    Ca    9.5      05 Dec 2022 07:04  Phos  4.7     12-05  Mg     2.5     12-05    TPro  7.5  /  Alb  3.7  /  TBili  1.0  /  DBili  x   /  AST  40  /  ALT  35  /  AlkPhos  92  12-05      TSH- normal    ECHO: 11/25 (pre-op)    1. Patient was tachycardic during the study.  2. The left atrium is moderately dilated. Left atrial volume index (CRUZ) is 46.7 ml/mï¿½.  3. Left ventricular hypertrophy present. Left ventricular systolic function is severely reduced with a calculated ejection fraction of 30% with global hypokinesis.  4. Normal right ventricular size and systolic function.  5. No significant valvular disease.  6. Pulmonary artery systolic pressure is 29 mmHg.  7. No pericardial effusion.      Assessment Plan:  62 year old male with morbid obesity, who presented to [place] with rapid AF and shortness of breath, found to have pulmonary edema, EF was 40%. Had [facility] showing CAD- 60% mLAD +50% LCx stenosis, with a OLEG showing severe MR, transferred to [facility] for management. On 11/28 pt had MV repair, CABG x1, bi-atrail cryomaze and CHANDRIKA ligation. Post-op reportedly was having bradycardia. Currently in AF/AFL in the 70s-80s with no significant bradycardia and rare V-pacing requirements with tvp set to vvi 50.     -continue to watch telemetry  -currently not on beta blockers. has EF of 40%, Can resume tomorrow if patient does not have any significant bradycardia  -AC when OK with primary team

## 2022-12-05 NOTE — PROGRESS NOTE ADULT - ASSESSMENT
63 y/o male PMH anxiety, morbid obesity, "fast heart rate" (not on blood thinners) who c/o SOB, N&V, night sweats and  palpitations for the last few days which progressively worsened. Patient was brought by EMS to [facility] and found to be in rapid atrial fibrillation -200, HTN with expiratory wheezing. Given Cardizem and albuterol with improvement. Placed on heparin drip at that time and given lasix after CXR showed pulmonary edema. Troponin found to be elevated, ruled in for NSTEMI. Cardiac cath completed and showed 2 vessel CAD mLAD 60% mCX 50% with EF 40% and medical management recommended. OLEG at OSH showed severe central MR. Patient was transferred to [facility] under the care of Dr. Reilly for surgical evaluation of his MR. Heparin gtt started, IV lasix started, amio loaded. TTE with EF 30%. Creatinine increased, IV lasix held. On 22 patient underwent MV repair, CABGx1, biatrial cryomaze EF 40%.  Patient arrived to CTICU on Levo, vaso, , sinus danny/junctional requiring pacing, received no products intraop.  POD 1 Extubated to Bipap.  POD 2 Weaned off vaso/levo.  POD 3 CT removed,  weaning down.  POD 4  weaned off, transferred to [facility]. POD 5 Hyponatremia improving. Diamox given for urine alkalization. Continuing in slow afib in the 40-50s V paced at 80. POD#6 Pace maker turned down and patient's blood pressure did not tolerate the slow rate. Returned to VVI 80. POD 7 VVI lowered to 60, pt with own native rhythm at 80 BPM, will continue to observe but unlikely candidate for PPM  Plan:    Neurovascular:   -Pain well controlled with current regimen. PRN's: oxy  -Anxiety    c/w xanax    Cardiovascular:   -Hemodynamically stable.   -Monitor: BP, HR, tele  -CAD s/p CAGBG    -c/w asa  -Fluid overload    -c/w aldactone 12.5 qd    -c/w lasix 40 po QD  -HLD    -lipitor  -Afib    -pt evaluated by EP who lowered VVI rate to 60. Pt is pacing at own rhythm in the 70s-80s. Will continue to monitor.     Respiratory:   -Oxygenating well on room air  -Encourage continued use of IS 10x/hr and frequent ambulation  -CXR:  L sided pleural effusion improved from yesterday    GI:  -GI PPX: protonix  -PO Diet  -Bowel Regimen: miralax    Renal / :  -Continue to monitor renal function: BUN/Cr /.57  -Monitor I/O's daily     Endocrine:    -No hx of DM or thyroid dx    Hematologic:  -CBC: H/H- 12.4/36  -Coagulation Panel.    ID:  -Temperature:   -CBC: WBC- 9.9  -Continue to observe for SIRS/Sepsis Syndrome.    Prophylaxis:  -DVT prophylaxis with 5000 SubQ Heparin q8h.  -Continue with SCD's b/l while patient is at rest     Disposition:  -Discharge home once patient is medically ready, likely with [facility]

## 2022-12-06 ENCOUNTER — TRANSCRIPTION ENCOUNTER (OUTPATIENT)
Age: 62
End: 2022-12-06

## 2022-12-06 LAB
ANION GAP SERPL CALC-SCNC: 10 MMOL/L — SIGNIFICANT CHANGE UP (ref 5–17)
BUN SERPL-MCNC: 28 MG/DL — HIGH (ref 7–23)
CALCIUM SERPL-MCNC: 9.4 MG/DL — SIGNIFICANT CHANGE UP (ref 8.4–10.5)
CHLORIDE SERPL-SCNC: 92 MMOL/L — LOW (ref 96–108)
CO2 SERPL-SCNC: 30 MMOL/L — SIGNIFICANT CHANGE UP (ref 22–31)
CREAT SERPL-MCNC: 1.47 MG/DL — HIGH (ref 0.5–1.3)
EGFR: 53 ML/MIN/1.73M2 — LOW
GLUCOSE SERPL-MCNC: 138 MG/DL — HIGH (ref 70–99)
HCT VFR BLD CALC: 34.6 % — LOW (ref 39–50)
HGB BLD-MCNC: 11.6 G/DL — LOW (ref 13–17)
MAGNESIUM SERPL-MCNC: 2.6 MG/DL — SIGNIFICANT CHANGE UP (ref 1.6–2.6)
MCHC RBC-ENTMCNC: 32.2 PG — SIGNIFICANT CHANGE UP (ref 27–34)
MCHC RBC-ENTMCNC: 33.5 GM/DL — SIGNIFICANT CHANGE UP (ref 32–36)
MCV RBC AUTO: 96.1 FL — SIGNIFICANT CHANGE UP (ref 80–100)
NRBC # BLD: 0 /100 WBCS — SIGNIFICANT CHANGE UP (ref 0–0)
PLATELET # BLD AUTO: 349 K/UL — SIGNIFICANT CHANGE UP (ref 150–400)
POTASSIUM SERPL-MCNC: 4 MMOL/L — SIGNIFICANT CHANGE UP (ref 3.5–5.3)
POTASSIUM SERPL-SCNC: 4 MMOL/L — SIGNIFICANT CHANGE UP (ref 3.5–5.3)
RBC # BLD: 3.6 M/UL — LOW (ref 4.2–5.8)
RBC # FLD: 13.8 % — SIGNIFICANT CHANGE UP (ref 10.3–14.5)
SODIUM SERPL-SCNC: 132 MMOL/L — LOW (ref 135–145)
WBC # BLD: 11.32 K/UL — HIGH (ref 3.8–10.5)
WBC # FLD AUTO: 11.32 K/UL — HIGH (ref 3.8–10.5)

## 2022-12-06 PROCEDURE — 71045 X-RAY EXAM CHEST 1 VIEW: CPT | Mod: 26

## 2022-12-06 RX ORDER — FUROSEMIDE 40 MG
40 TABLET ORAL EVERY 12 HOURS
Refills: 0 | Status: DISCONTINUED | OUTPATIENT
Start: 2022-12-06 | End: 2022-12-07

## 2022-12-06 RX ORDER — METOPROLOL TARTRATE 50 MG
12.5 TABLET ORAL EVERY 12 HOURS
Refills: 0 | Status: DISCONTINUED | OUTPATIENT
Start: 2022-12-06 | End: 2022-12-07

## 2022-12-06 RX ORDER — SENNA PLUS 8.6 MG/1
1 TABLET ORAL DAILY
Refills: 0 | Status: DISCONTINUED | OUTPATIENT
Start: 2022-12-06 | End: 2022-12-07

## 2022-12-06 RX ORDER — ACETAMINOPHEN 500 MG
650 TABLET ORAL EVERY 6 HOURS
Refills: 0 | Status: DISCONTINUED | OUTPATIENT
Start: 2022-12-06 | End: 2022-12-07

## 2022-12-06 RX ADMIN — Medication 12.5 MILLIGRAM(S): at 17:23

## 2022-12-06 RX ADMIN — Medication 81 MILLIGRAM(S): at 11:37

## 2022-12-06 RX ADMIN — POLYETHYLENE GLYCOL 3350 17 GRAM(S): 17 POWDER, FOR SOLUTION ORAL at 11:38

## 2022-12-06 RX ADMIN — HEPARIN SODIUM 7500 UNIT(S): 5000 INJECTION INTRAVENOUS; SUBCUTANEOUS at 06:40

## 2022-12-06 RX ADMIN — SODIUM CHLORIDE 3 MILLILITER(S): 9 INJECTION INTRAMUSCULAR; INTRAVENOUS; SUBCUTANEOUS at 06:59

## 2022-12-06 RX ADMIN — CHLORHEXIDINE GLUCONATE 1 APPLICATION(S): 213 SOLUTION TOPICAL at 11:38

## 2022-12-06 RX ADMIN — HEPARIN SODIUM 7500 UNIT(S): 5000 INJECTION INTRAVENOUS; SUBCUTANEOUS at 13:14

## 2022-12-06 RX ADMIN — OXYCODONE HYDROCHLORIDE 15 MILLIGRAM(S): 5 TABLET ORAL at 00:50

## 2022-12-06 RX ADMIN — SODIUM CHLORIDE 3 MILLILITER(S): 9 INJECTION INTRAMUSCULAR; INTRAVENOUS; SUBCUTANEOUS at 13:03

## 2022-12-06 RX ADMIN — SODIUM CHLORIDE 3 MILLILITER(S): 9 INJECTION INTRAMUSCULAR; INTRAVENOUS; SUBCUTANEOUS at 21:07

## 2022-12-06 RX ADMIN — PANTOPRAZOLE SODIUM 40 MILLIGRAM(S): 20 TABLET, DELAYED RELEASE ORAL at 11:37

## 2022-12-06 RX ADMIN — HEPARIN SODIUM 7500 UNIT(S): 5000 INJECTION INTRAVENOUS; SUBCUTANEOUS at 21:22

## 2022-12-06 RX ADMIN — Medication 40 MILLIGRAM(S): at 06:40

## 2022-12-06 RX ADMIN — Medication 0.5 MILLIGRAM(S): at 21:22

## 2022-12-06 RX ADMIN — OXYCODONE HYDROCHLORIDE 15 MILLIGRAM(S): 5 TABLET ORAL at 00:20

## 2022-12-06 RX ADMIN — SPIRONOLACTONE 12.5 MILLIGRAM(S): 25 TABLET, FILM COATED ORAL at 06:40

## 2022-12-06 RX ADMIN — SENNA PLUS 1 TABLET(S): 8.6 TABLET ORAL at 17:22

## 2022-12-06 RX ADMIN — ATORVASTATIN CALCIUM 80 MILLIGRAM(S): 80 TABLET, FILM COATED ORAL at 21:22

## 2022-12-06 RX ADMIN — Medication 40 MILLIGRAM(S): at 17:23

## 2022-12-06 NOTE — DISCHARGE NOTE PROVIDER - NSDCFUSCHEDAPPT_GEN_ALL_CORE_FT
Tony Reilly  [facility] Physician [facility]  [facility] [address]  Scheduled Appointment: 12/13/2022

## 2022-12-06 NOTE — DISCHARGE NOTE PROVIDER - NSDCCPCAREPLAN_GEN_ALL_CORE_FT
PRINCIPAL DISCHARGE DIAGNOSIS  Diagnosis: Severe mitral valve regurgitation  Assessment and Plan of Treatment:

## 2022-12-06 NOTE — DISCHARGE NOTE PROVIDER - NSDCMRMEDTOKEN_GEN_ALL_CORE_FT
Ambien 5 mg oral tablet: 1 tab(s) orally once a day (at bedtime), As Needed  Lopressor 50 mg oral tablet: 1 tab(s) orally 2 times a day  Rolling walker: ICD Code: I 25.1, I 34.0  HT: 185.4cm  Wt: 124.60kg  Rolling walker ICD i25:    acetaminophen 325 mg oral tablet: 2 tab(s) orally every 6 hours, As needed, for pain  aspirin 81 mg oral delayed release tablet: 1 tab(s) orally once a day  atorvastatin 80 mg oral tablet: 1 tab(s) orally once a day (at bedtime)  Eliquis 5 mg oral tablet: 1 tab(s) orally 2 times a day   furosemide 40 mg oral tablet: 1 tab(s) orally every 12 hours  metoprolol tartrate 25 mg oral tablet: 0.5 tab(s) orally 2 times a day   pantoprazole 40 mg oral delayed release tablet: 1 tab(s) orally once a day  polyethylene glycol 3350 oral powder for reconstitution: 17 gram(s) orally once a day  Rolling walker: ICD Code: I 25.1, I 34.0  HT: 185.4cm  Wt: 124.60kg  Rolling walker ICD i25:   senna leaf extract oral tablet: 1 tab(s) orally once a day  spironolactone 25 mg oral tablet: 0.5 tab(s) orally once a day

## 2022-12-06 NOTE — PROGRESS NOTE ADULT - ASSESSMENT
61 y/o male PMH anxiety, morbid obesity, "fast heart rate" (not on blood thinners) who c/o SOB, N&V, night sweats and  palpitations for the last few days which progressively worsened. Patient was brought by EMS to [facility] and found to be in rapid atrial fibrillation -200, HTN with expiratory wheezing. Given Cardizem and albuterol with improvement. Placed on heparin drip at that time and given lasix after CXR showed pulmonary edema. Troponin found to be elevated, ruled in for NSTEMI. Cardiac cath completed and showed 2 vessel CAD mLAD 60% mCX 50% with EF 40% and medical management recommended. OLEG at OSH showed severe central MR. Patient was transferred to [facility] under the care of Dr. Reilly for surgical evaluation of his MR. Heparin gtt started, IV lasix started, amio loaded. TTE with EF 30%. Creatinine increased, IV lasix held. On 22 patient underwent MV repair, CABGx1, biatrial cryomaze EF 40%.  Patient arrived to CTICU on Levo, vaso, , sinus danny/junctional requiring pacing, received no products intraop.  POD 1 Extubated to Bipap.  POD 2 Weaned off vaso/levo.  POD 3 CT removed,  weaning down.  POD 4  weaned off, transferred to [facility]. POD 5 Hyponatremia improving. Diamox given for urine alkalization. Continuing in slow afib in the 40-50s V paced at 80. POD#6 Pace maker turned down and patient's blood pressure did not tolerate the slow rate. Returned to VVI 80. POD 7 VVI lowered to 60, pt with own native rhythm at 80 BPM, will continue to observe but unlikely candidate for PPM. POD 8 pt feeling well, ambulating frequently. Pt has maintained his own rhythm at apprx 65 BPM with stable BPs, no longer pacing.       Plan:    Neurovascular:   -Pain well controlled with current regimen. PRN's: oxy  -Anxiety    c/w xanax    Cardiovascular:   -Hemodynamically stable.   -Monitor: BP, HR, tele  -CAD s/p CABG    -c/w asa  -Fluid overload    -c/w aldactone 12.5 qd    -c/w lasix 40 po bid  -HLD    -lipitor  -paroxysmal afib    -will likely start AC after wires pulled tomorrow      Respiratory:   -Oxygenating well on room air  -Encourage continued use of IS 10x/hr and frequent ambulation  -CXR: stable mild bilat pleural effusions    GI:  -GI PPX: protonix  -PO Diet  -Bowel Regimen: miralax    Renal / :  -Continue to monitor renal function: BUN/Cr /.47  -Monitor I/O's daily     Endocrine:    -No hx of DM or thyroid dx    Hematologic:  -CBC: H/H- 11.6/34.6      ID:  -Temperature: 36.4  -CBC: WBC- 11.32  -Continue to observe for SIRS/Sepsis Syndrome.    Prophylaxis:  -DVT prophylaxis with 5000 SubQ Heparin q8h.  -Continue with SCD's b/l while patient is at rest     Disposition:  -Discharge home once patient is medically ready, likely with [facility]

## 2022-12-06 NOTE — DISCHARGE NOTE PROVIDER - NSDCFUADDAPPT_GEN_ALL_CORE_FT
Our office will call you with the dates and times for your follow up appointments, but if you do not hear from our team by Friday, please call [phone]

## 2022-12-06 NOTE — DISCHARGE NOTE PROVIDER - NSDCFUADDINST_GEN_ALL_CORE_FT
-Walk daily as tolerated and use your incentive spirometer 10 times every hour while you are awake.     -Please weigh yourself daily. If you notice over a 3 pound weight gain in 3 days, this is a sign you are likely retaining too much fluid. It is imperative you call our right away with unexplained rapid weight gain.      -Please continue to wear the compression stockings given to you in the hospital at home. This is a way to prevent fluid from building up in your legs.     -No driving or strenuous activity/exercise until cleared by your surgeon. Do not sit in the front seat of a car.     -Gently clean your incisions with unscented/antibacterial soap and water, pat dry.  You may leave them open to air.    -Call your doctor if you have shortness of breath, chest pain not relieved by pain medication, dizziness, fever >101.5, or increased redness or drainage from incisions.   You had a [facility] monitor (an external cardiac rhythm monitoring device) placed on your day of discharge.  This helps us monitor your heart while you are out of the hospital for 30 days after discharge. Should your heart go into an abnormal or dangerous rhythm you will receieve a call from the [facility] team and your [facility] team of Doctors and PA's will be notified.    1. Keep the monitor within 30 feet of you at all times.  2. When you feel any symptom (chest pain, dizziness, palpitations, weakness, fatigue or anything outside of your normal), press the “Record Symptoms” button on the main phone of your phone  3. Shower or exercise as normal whilewearing the [facility] Patch. Do not swim or take a bath. Patch is water-resistant, not waterproof  4. When the battery is low on the phone or on the device, use the supplied . The monitor will show a warning message when the battery is low.  5. Do not remove the patch from yourskin after you begin monitoring. With normal wear, each patch should last 5 days. To replace the patch follow instructions in the [facility] box with the Patch Guide  6. Any issues with the [facility] device or phone please call Customer Service at [phone].  7. If you have any other questions at all please call the Structural Heart office at [phone]      -Walk daily as tolerated and use your incentive spirometer 10 times every hour while you are awake.     -Please weigh yourself daily. If you notice over a 3 pound weight gain in 3 days, this is a sign you are likely retaining too much fluid. It is imperative you call our right away with unexplained rapid weight gain.      -Please continue to wear the compression stockings given to you in the hospital at home. This is a way to prevent fluid from building up in your legs.     -No driving or strenuous activity/exercise until cleared by your surgeon. Do not sit in the front seat of a car.     -Gently clean your incisions with unscented/antibacterial soap and water, pat dry.  You may leave them open to air.    -Call your doctor if you have shortness of breath, chest pain not relieved by pain medication, dizziness, fever >101.5, or increased redness or drainage from incisions.

## 2022-12-06 NOTE — DISCHARGE NOTE PROVIDER - PROVIDER TOKENS
PROVIDER:[TOKEN:[[id number]],FOLLOWUP:[1 week]],PROVIDER:[TOKEN:[[id number]],FOLLOWUP:[2 weeks]] PROVIDER:[TOKEN:[[id number]],SCHEDULEDAPPT:[12/13/2022],SCHEDULEDAPPTTIME:[11:15 AM]],PROVIDER:[TOKEN:[[id number]],FOLLOWUP:[2 weeks]]

## 2022-12-06 NOTE — DISCHARGE NOTE PROVIDER - HOSPITAL COURSE
61 y/o male PMH anxiety, morbid obesity, "fast heart rate" (not on blood thinners) who c/o SOB, N&V, night sweats and  palpitations for the last few days which progressively worsened. Patient was brought by EMS to [facility] and found to be in rapid atrial fibrillation -200, HTN with expiratory wheezing. Given Cardizem and albuterol with improvement. Placed on heparin drip at that time and given lasix after CXR showed pulmonary edema. Troponin found to be elevated, ruled in for NSTEMI. Cardiac cath completed and showed 2 vessel CAD mLAD 60% mCX 50% with EF 40% and medical management recommended. OLEG at OSH showed severe central MR. Patient was transferred to [facility] under the care of Dr. Reilly for surgical evaluation of his MR. Heparin gtt started, IV lasix started, amio loaded. TTE with EF 30%. Creatinine increased, IV lasix held. On 22 patient underwent MV repair, CABGx1, biatrial cryomaze EF 40%.  Patient arrived to CTICU on Levo, vaso, , sinus danny/junctional requiring pacing, received no products intraop.  POD 1 Extubated to Bipap.  POD 2 Weaned off vaso/levo.  POD 3 CT removed,  weaning down.  POD 4  weaned off, transferred to [facility]. POD 5 Hyponatremia improving. Diamox given for urine alkalization. Continuing in slow afib in the 40-50s V paced at 80. POD#6 Pace maker turned down and patient's blood pressure did not tolerate the slow rate. Returned to VVI 80. POD 7 VVI lowered to 60, pt with own native rhythm at 80 BPM, will continue to observe but unlikely candidate for PPM. POD 8 pt feeling well, ambulating frequently. Pt has maintained his own rhythm at apprx 65 BPM with stable BPs, no longer pacing. POD 8 ___________. Pt cleared for discharge by Dr. Reilly     Over 35 minutes was spent with the patient reviewing the discharge material including medications, follow up appointments, recovery, concerning symptoms, and how to contact their health care providers if they have questions      CARDIAC SURGERY DISCHARGE CHECKLIST:      CABG        [ x] Aspirin, [  ] Contraindicated, Reason_______________________________        [ ] Plavix, [ x ] Contraindicated, Reason__will be on eliquis for afib_____        [ x] Statin, [  ] Contraindicated, Reason_______________________________        [ x] Lasix , [  ] Contraindicated, Reason_______________________________              Duration: _____        [ x] Beta-Blocker, [  ] Contraindicated, Reason_______________________________       61 y/o male PMH anxiety, morbid obesity, "fast heart rate" (not on blood thinners) who c/o SOB, N&V, night sweats and  palpitations for the last few days which progressively worsened. Patient was brought by EMS to [facility] and found to be in rapid atrial fibrillation -200, HTN with expiratory wheezing. Given Cardizem and albuterol with improvement. Placed on heparin drip at that time and given lasix after CXR showed pulmonary edema. Troponin found to be elevated, ruled in for NSTEMI. Cardiac cath completed and showed 2 vessel CAD mLAD 60% mCX 50% with EF 40% and medical management recommended. OLEG at OSH showed severe central MR. Patient was transferred to [facility] under the care of Dr. Reilly for surgical evaluation of his MR. Heparin gtt started, IV lasix started, amio loaded. TTE with EF 30%. Creatinine increased, IV lasix held. On 22 patient underwent MV repair, CABGx1, biatrial cryomaze EF 40%.  Patient arrived to CTICU on Levo, vaso, , sinus danny/junctional requiring pacing, received no products intraop.  POD 1 Extubated to Bipap.  POD 2 Weaned off vaso/levo.  POD 3 CT removed,  weaning down.  POD 4  weaned off, transferred to [facility]. POD 5 Hyponatremia improving. Diamox given for urine alkalization. Continuing in slow afib in the 40-50s V paced at 80. POD#6 Pace maker turned down and patient's blood pressure did not tolerate the slow rate. Returned to VVI 80. POD 7 VVI lowered to 60, pt with own native rhythm at 80 BPM, will continue to observe but unlikely candidate for PPM. POD 8 pt feeling well, ambulating frequently. Pt has maintained his own rhythm at apprx 65 BPM with stable BPs, no longer pacing. POD 9 still in NSR with stable BP. Pt feels well, feels ready for DC. Will go home with OT. Starting eliquis for Afib. Pt cleared for discharge by Dr. Reilly.    Over 35 minutes was spent with the patient reviewing the discharge material including medications, follow up appointments, recovery, concerning symptoms, and how to contact their health care providers if they have questions      CARDIAC SURGERY DISCHARGE CHECKLIST:      CABG        [ x] Aspirin, [  ] Contraindicated, Reason_______________________________        [ ] Plavix, [ x ] Contraindicated, Reason__will be on eliquis for afib_____        [ x] Statin, [  ] Contraindicated, Reason_______________________________        [ x] Lasix , [  ] Contraindicated, Reason_______________________________              Duration: _____        [ x] Beta-Blocker, [  ] Contraindicated, Reason_______________________________

## 2022-12-06 NOTE — PROGRESS NOTE ADULT - SUBJECTIVE AND OBJECTIVE BOX
Patient discussed on morning rounds with Dr. Reilly    OPERATION & DATE:11/28 MV repair, ABG x1 LIMA-LAD, CHANDRIKA slip, biatrial cryomaze  VITAL SIGNS:  Vital Signs Last 24 Hrs  T(C): 36 (06 Dec 2022 13:32), Max: 36.4 (05 Dec 2022 20:24)  T(F): 96.8 (06 Dec 2022 13:32), Max: 97.6 (05 Dec 2022 20:24)  HR: 66 (06 Dec 2022 12:25) (62 - 78)  BP: 119/60 (06 Dec 2022 12:25) (103/59 - 136/86)  BP(mean): 82 (06 Dec 2022 12:25) (74 - 106)  RR: 16 (06 Dec 2022 12:25) (16 - 18)  SpO2: 94% (06 Dec 2022 12:25) (93% - 97%)    Parameters below as of 06 Dec 2022 12:25  Patient On (Oxygen Delivery Method): room air      I&O's Detail    05 Dec 2022 07:01  -  06 Dec 2022 07:00  --------------------------------------------------------  IN:    Oral Fluid: 720 mL  Total IN: 720 mL    OUT:    Voided (mL): 300 mL  Total OUT: 300 mL    Total NET: 420 mL        CHEST TUBE:  none  JUSTUS DRAIN:  none  EPICARDIAL WIRES: in place  STITCHES: 2 tie downs in place  STAPLES: none  HUERTA: none  CENTRAL LINE: none  MIDLINE/PICC: none  WOUND VAC: none    PHYSICAL EXAM:  General: resting comfortably in chair in NAD  Neurological: AOx3. Motor skills grossly intact  Cardiovascular: Normal S1/S2. Regular rate/rhythm. No murmurs  Respiratory: Lungs CTA bilaterally. No wheezing or rales  Gastrointestinal: +BS in all 4 quadrants. Non-distended. Soft. Non-tender  Extremities: Strength 5/5 b/l upper/lower extremities. Sensation grossly intact upper/lower extremities. No edema. No calf tenderness.  Vascular: Radial 2+bilaterally, DP 2+ b/l  Incision Sites: sternal incision healing well with no dehiscense or erythema.     LABS:                        11.6   11.32 )-----------( 349      ( 06 Dec 2022 05:30 )             34.6       12-06    132<L>  |  92<L>  |  28<H>  ----------------------------<  138<H>  4.0   |  30  |  1.47<H>    Ca    9.4      06 Dec 2022 05:30  Phos  4.7     12-05  Mg     2.6     12-06    TPro  7.5  /  Alb  3.7  /  TBili  1.0  /  DBili  x   /  AST  40  /  ALT  35  /  AlkPhos  92  12-05      MEDICATIONS  (STANDING):  ALPRAZolam 0.5 milliGRAM(s) Oral at bedtime  aspirin enteric coated 81 milliGRAM(s) Oral daily  atorvastatin 80 milliGRAM(s) Oral at bedtime  chlorhexidine 2% Cloths 1 Application(s) Topical daily  furosemide    Tablet 40 milliGRAM(s) Oral every 12 hours  heparin   Injectable 7500 Unit(s) SubCutaneous every 8 hours  pantoprazole    Tablet 40 milliGRAM(s) Oral daily  polyethylene glycol 3350 17 Gram(s) Oral daily  sodium chloride 0.9% lock flush 3 milliLiter(s) IV Push every 8 hours  spironolactone 12.5 milliGRAM(s) Oral daily    MEDICATIONS  (PRN):  acetaminophen     Tablet .. 650 milliGRAM(s) Oral every 6 hours PRN Temp greater or equal to 38C (100.4F), Mild Pain (1 - 3)  oxyCODONE    IR 15 milliGRAM(s) Oral every 4 hours PRN Moderate Pain (4 - 6)    RADIOLOGY & ADDITIONAL TESTS:    < from: Xray Chest 1 View- PORTABLE-Routine (Xray Chest 1 View- PORTABLE-Routine in AM.) (12.06.22 @ 05:00) >  Findings/  impression: Bilateral pleural effusions, unchanged. Stable cardiomegaly,   status post median sternotomy, left atrial appendage clip, mitral valve   replacement.. Stable bony structures.    < end of copied text >

## 2022-12-06 NOTE — DISCHARGE NOTE PROVIDER - CARE PROVIDERS DIRECT ADDRESSES
[email or web address]

## 2022-12-06 NOTE — PROGRESS NOTE ADULT - ASSESSMENT
62 year old male with morbid obesity, who presented to [facility] with rapid AF and shortness of breath, found to have pulmonary edema, EF was 40%. Had [facility] showing CAD- 60% mLAD +50% LCx stenosis, with a OLEG showing severe MR, transferred to [facility] for management. On 11/28 pt had MV repair, CABG x1, bi-atrial cryomaze and CHANDRIKA ligation. Post-op reportedly was bradycardic then developed AF/AFL 70s-80s with no significant bradycardia and rare V-pacing requirements with tvp set to VVI 50. Today at around midnight he self-converted to sinus with no conversion pauses. He is now in sinus rhythm 60-70 bpm.     - BB?  - AMIO?  - OAC?    ...           62 year old male with morbid obesity, who presented to [facility] with rapid AF and shortness of breath, found to have pulmonary edema, EF was 40%. Had [facility] showing CAD- 60% mLAD +50% LCx stenosis, with a OLEG showing severe MR, transferred to [facility] for management. On 11/28 pt had MV repair, CABG x1, bi-atrial cryomaze and CHANDRIKA ligation. Post-op reportedly was bradycardic then developed AF/AFL 70s-80s with no significant bradycardia and rare V-pacing requirements with tvp set to VVI 50. Today at around midnight he self-converted to sinus with no conversion pauses. He is now in sinus rhythm 60-70 bpm.     - Indication for BB (HF and CAD). Recommend trialing low-dose BB while inpatient such as Toprol 25mg daily.  - Recommend starting AMIO 200mg daily for AF suppression.  - Recommend starting DOAC for stroke/TE prophylaxis

## 2022-12-06 NOTE — DISCHARGE NOTE PROVIDER - CARE PROVIDER_API CALL
Tony Reilly (MD)  Cardiovascular Surgery  [address] [place] [address]  Phone: [phone]  Fax: [phone]  Follow Up Time: 1 week    Jean Palacios ()  Cardiovascular Disease; Interventional Cardiology  [address], [address] [place] [address]  Phone: [phone]  Fax: [phone]  Follow Up Time: 2 weeks   Tony Reilly (MD)  Cardiovascular Surgery  [address] [place] [address]  Phone: [phone]  Fax: [phone]  Scheduled Appointment: 12/13/2022 11:15 AM    Jean Palacios ()  Cardiovascular Disease; Interventional Cardiology  [address], [address] [place] [address]  Phone: [phone]  Fax: [phone]  Follow Up Time: 2 weeks

## 2022-12-06 NOTE — PROGRESS NOTE ADULT - SUBJECTIVE AND OBJECTIVE BOX
EPS Progress Note    S: NAD      T(C): 35.8 (12-06-22 @ 05:01), Max: 36.4 (12-05-22 @ 20:24)  HR: 66 (12-06-22 @ 08:40) (62 - 78)  BP: 115/55 (12-06-22 @ 08:40) (101/65 - 136/86)  RR: 18 (12-06-22 @ 08:40) (18 - 18)  SpO2: 94% (12-06-22 @ 08:40) (93% - 97%)     Telemetry: SR 60-70 bpm           MEDICATIONS  (STANDING):  ALPRAZolam 0.5 milliGRAM(s) Oral at bedtime  aspirin enteric coated 81 milliGRAM(s) Oral daily  atorvastatin 80 milliGRAM(s) Oral at bedtime  chlorhexidine 2% Cloths 1 Application(s) Topical daily  furosemide    Tablet 40 milliGRAM(s) Oral every 12 hours  heparin   Injectable 7500 Unit(s) SubCutaneous every 8 hours  pantoprazole    Tablet 40 milliGRAM(s) Oral daily  polyethylene glycol 3350 17 Gram(s) Oral daily  sodium chloride 0.9% lock flush 3 milliLiter(s) IV Push every 8 hours  spironolactone 12.5 milliGRAM(s) Oral daily    MEDICATIONS  (PRN):  acetaminophen     Tablet  650 milliGRAM(s) Oral every 6 hours PRN Temp greater or equal to 38C (100.4F), Mild Pain (1 - 3)  oxyCODONE    IR 15 milliGRAM(s) Oral every 4 hours PRN Moderate Pain (4 - 6)       LABS:                                                11.6   11.32 )-----------( 349      ( 06 Dec 2022 05:30 )             34.6     12-06    132<L>  |  92<L>  |  28<H>  ----------------------------<  138<H>  4.0   |  30  |  1.47<H>    Ca    9.4      06 Dec 2022 05:30  Phos  4.7     12-05  Mg     2.6     12-06    TPro  7.5  /  Alb  3.7  /  TBili  1.0  /  DBili  x   /  AST  40  /  ALT  35  /  AlkPhos  92  12-05    < from: TTE Echo Complete w/ Contrast w/ Doppler (11.25.22 @ 09:12) >   1. Patient was tachycardic during the study.   2. The left atrium is moderately dilated. Left atrial volume index (CRUZ) is 46.7 ml/m².   3. Left ventricular hypertrophy present. Left ventricular systolic function is severely reduced with a calculated ejection fraction of 30% with global hypokinesis.   4. Normal right ventricular size and systolic function.   5. No significant valvular disease.   6. Pulmonary artery systolic pressure is 29 mmHg.   7. No pericardial effusion.

## 2022-12-07 ENCOUNTER — TRANSCRIPTION ENCOUNTER (OUTPATIENT)
Age: 62
End: 2022-12-07

## 2022-12-07 VITALS
HEART RATE: 68 BPM | RESPIRATION RATE: 18 BRPM | SYSTOLIC BLOOD PRESSURE: 114 MMHG | DIASTOLIC BLOOD PRESSURE: 56 MMHG | OXYGEN SATURATION: 96 %

## 2022-12-07 DIAGNOSIS — Z98.890 OTHER SPECIFIED POSTPROCEDURAL STATES: ICD-10-CM

## 2022-12-07 LAB
ALBUMIN SERPL ELPH-MCNC: 3.7 G/DL — SIGNIFICANT CHANGE UP (ref 3.3–5)
ALP SERPL-CCNC: 95 U/L — SIGNIFICANT CHANGE UP (ref 40–120)
ALT FLD-CCNC: 33 U/L — SIGNIFICANT CHANGE UP (ref 10–45)
ANION GAP SERPL CALC-SCNC: 11 MMOL/L — SIGNIFICANT CHANGE UP (ref 5–17)
APTT BLD: 32.8 SEC — SIGNIFICANT CHANGE UP (ref 27.5–35.5)
AST SERPL-CCNC: 30 U/L — SIGNIFICANT CHANGE UP (ref 10–40)
BILIRUB SERPL-MCNC: 1.1 MG/DL — SIGNIFICANT CHANGE UP (ref 0.2–1.2)
BUN SERPL-MCNC: 27 MG/DL — HIGH (ref 7–23)
CALCIUM SERPL-MCNC: 9.3 MG/DL — SIGNIFICANT CHANGE UP (ref 8.4–10.5)
CHLORIDE SERPL-SCNC: 93 MMOL/L — LOW (ref 96–108)
CO2 SERPL-SCNC: 30 MMOL/L — SIGNIFICANT CHANGE UP (ref 22–31)
CREAT SERPL-MCNC: 1.48 MG/DL — HIGH (ref 0.5–1.3)
EGFR: 53 ML/MIN/1.73M2 — LOW
GLUCOSE SERPL-MCNC: 143 MG/DL — HIGH (ref 70–99)
HCT VFR BLD CALC: 34.4 % — LOW (ref 39–50)
HGB BLD-MCNC: 11.7 G/DL — LOW (ref 13–17)
INR BLD: 1.17 — HIGH (ref 0.88–1.16)
MAGNESIUM SERPL-MCNC: 2.5 MG/DL — SIGNIFICANT CHANGE UP (ref 1.6–2.6)
MCHC RBC-ENTMCNC: 32.7 PG — SIGNIFICANT CHANGE UP (ref 27–34)
MCHC RBC-ENTMCNC: 34 GM/DL — SIGNIFICANT CHANGE UP (ref 32–36)
MCV RBC AUTO: 96.1 FL — SIGNIFICANT CHANGE UP (ref 80–100)
NRBC # BLD: 0 /100 WBCS — SIGNIFICANT CHANGE UP (ref 0–0)
PLATELET # BLD AUTO: 367 K/UL — SIGNIFICANT CHANGE UP (ref 150–400)
POTASSIUM SERPL-MCNC: 4.1 MMOL/L — SIGNIFICANT CHANGE UP (ref 3.5–5.3)
POTASSIUM SERPL-SCNC: 4.1 MMOL/L — SIGNIFICANT CHANGE UP (ref 3.5–5.3)
PROT SERPL-MCNC: 7.4 G/DL — SIGNIFICANT CHANGE UP (ref 6–8.3)
PROTHROM AB SERPL-ACNC: 14 SEC — HIGH (ref 10.5–13.4)
RBC # BLD: 3.58 M/UL — LOW (ref 4.2–5.8)
RBC # FLD: 13.5 % — SIGNIFICANT CHANGE UP (ref 10.3–14.5)
SODIUM SERPL-SCNC: 134 MMOL/L — LOW (ref 135–145)
WBC # BLD: 10.98 K/UL — HIGH (ref 3.8–10.5)
WBC # FLD AUTO: 10.98 K/UL — HIGH (ref 3.8–10.5)

## 2022-12-07 PROCEDURE — 71045 X-RAY EXAM CHEST 1 VIEW: CPT

## 2022-12-07 PROCEDURE — 82553 CREATINE MB FRACTION: CPT

## 2022-12-07 PROCEDURE — 97116 GAIT TRAINING THERAPY: CPT

## 2022-12-07 PROCEDURE — 85730 THROMBOPLASTIN TIME PARTIAL: CPT

## 2022-12-07 PROCEDURE — 93005 ELECTROCARDIOGRAM TRACING: CPT

## 2022-12-07 PROCEDURE — 80061 LIPID PANEL: CPT

## 2022-12-07 PROCEDURE — 94150 VITAL CAPACITY TEST: CPT

## 2022-12-07 PROCEDURE — 85025 COMPLETE CBC W/AUTO DIFF WBC: CPT

## 2022-12-07 PROCEDURE — 82962 GLUCOSE BLOOD TEST: CPT

## 2022-12-07 PROCEDURE — 85610 PROTHROMBIN TIME: CPT

## 2022-12-07 PROCEDURE — C2618: CPT

## 2022-12-07 PROCEDURE — 85027 COMPLETE CBC AUTOMATED: CPT

## 2022-12-07 PROCEDURE — 84484 ASSAY OF TROPONIN QUANT: CPT

## 2022-12-07 PROCEDURE — 94640 AIRWAY INHALATION TREATMENT: CPT

## 2022-12-07 PROCEDURE — 71046 X-RAY EXAM CHEST 2 VIEWS: CPT

## 2022-12-07 PROCEDURE — 97162 PT EVAL MOD COMPLEX 30 MIN: CPT

## 2022-12-07 PROCEDURE — 87635 SARS-COV-2 COVID-19 AMP PRB: CPT

## 2022-12-07 PROCEDURE — 82550 ASSAY OF CK (CPK): CPT

## 2022-12-07 PROCEDURE — 83605 ASSAY OF LACTIC ACID: CPT

## 2022-12-07 PROCEDURE — U0003: CPT

## 2022-12-07 PROCEDURE — U0005: CPT

## 2022-12-07 PROCEDURE — 86901 BLOOD TYPING SEROLOGIC RH(D): CPT

## 2022-12-07 PROCEDURE — 80048 BASIC METABOLIC PNL TOTAL CA: CPT

## 2022-12-07 PROCEDURE — 83735 ASSAY OF MAGNESIUM: CPT

## 2022-12-07 PROCEDURE — 94002 VENT MGMT INPAT INIT DAY: CPT

## 2022-12-07 PROCEDURE — 83880 ASSAY OF NATRIURETIC PEPTIDE: CPT

## 2022-12-07 PROCEDURE — 82803 BLOOD GASES ANY COMBINATION: CPT

## 2022-12-07 PROCEDURE — 83036 HEMOGLOBIN GLYCOSYLATED A1C: CPT

## 2022-12-07 PROCEDURE — 81003 URINALYSIS AUTO W/O SCOPE: CPT

## 2022-12-07 PROCEDURE — 36415 COLL VENOUS BLD VENIPUNCTURE: CPT

## 2022-12-07 PROCEDURE — C8929: CPT

## 2022-12-07 PROCEDURE — 71045 X-RAY EXAM CHEST 1 VIEW: CPT | Mod: 26,59

## 2022-12-07 PROCEDURE — 86850 RBC ANTIBODY SCREEN: CPT

## 2022-12-07 PROCEDURE — 86803 HEPATITIS C AB TEST: CPT

## 2022-12-07 PROCEDURE — 82947 ASSAY GLUCOSE BLOOD QUANT: CPT

## 2022-12-07 PROCEDURE — 86900 BLOOD TYPING SEROLOGIC ABO: CPT

## 2022-12-07 PROCEDURE — P9045: CPT

## 2022-12-07 PROCEDURE — 84100 ASSAY OF PHOSPHORUS: CPT

## 2022-12-07 PROCEDURE — 71046 X-RAY EXAM CHEST 2 VIEWS: CPT | Mod: 26

## 2022-12-07 PROCEDURE — C1751: CPT

## 2022-12-07 PROCEDURE — 84295 ASSAY OF SERUM SODIUM: CPT

## 2022-12-07 PROCEDURE — 84132 ASSAY OF SERUM POTASSIUM: CPT

## 2022-12-07 PROCEDURE — C1889: CPT

## 2022-12-07 PROCEDURE — 93880 EXTRACRANIAL BILAT STUDY: CPT

## 2022-12-07 PROCEDURE — 82330 ASSAY OF CALCIUM: CPT

## 2022-12-07 PROCEDURE — 86923 COMPATIBILITY TEST ELECTRIC: CPT

## 2022-12-07 PROCEDURE — 84443 ASSAY THYROID STIM HORMONE: CPT

## 2022-12-07 PROCEDURE — 86891 AUTOLOGOUS BLOOD OP SALVAGE: CPT

## 2022-12-07 PROCEDURE — 85014 HEMATOCRIT: CPT

## 2022-12-07 PROCEDURE — 97530 THERAPEUTIC ACTIVITIES: CPT

## 2022-12-07 PROCEDURE — 80053 COMPREHEN METABOLIC PANEL: CPT

## 2022-12-07 RX ORDER — METOPROLOL TARTRATE 50 MG
1 TABLET ORAL
Qty: 0 | Refills: 0 | DISCHARGE

## 2022-12-07 RX ORDER — PANTOPRAZOLE SODIUM 20 MG/1
1 TABLET, DELAYED RELEASE ORAL
Qty: 30 | Refills: 0
Start: 2022-12-07 | End: 2023-01-05

## 2022-12-07 RX ORDER — ACETAMINOPHEN 500 MG
2 TABLET ORAL
Qty: 50 | Refills: 0
Start: 2022-12-07

## 2022-12-07 RX ORDER — METOPROLOL TARTRATE 50 MG
0.5 TABLET ORAL
Qty: 30 | Refills: 0
Start: 2022-12-07 | End: 2023-01-05

## 2022-12-07 RX ORDER — ATORVASTATIN CALCIUM 80 MG/1
1 TABLET, FILM COATED ORAL
Qty: 30 | Refills: 0
Start: 2022-12-07 | End: 2023-01-05

## 2022-12-07 RX ORDER — POLYETHYLENE GLYCOL 3350 17 G/17G
17 POWDER, FOR SOLUTION ORAL
Qty: 119 | Refills: 0
Start: 2022-12-07 | End: 2022-12-13

## 2022-12-07 RX ORDER — ZOLPIDEM TARTRATE 10 MG/1
1 TABLET ORAL
Qty: 0 | Refills: 0 | DISCHARGE

## 2022-12-07 RX ORDER — ASPIRIN/CALCIUM CARB/MAGNESIUM 324 MG
1 TABLET ORAL
Qty: 30 | Refills: 0
Start: 2022-12-07 | End: 2023-01-05

## 2022-12-07 RX ORDER — APIXABAN 2.5 MG/1
1 TABLET, FILM COATED ORAL
Qty: 60 | Refills: 0
Start: 2022-12-07 | End: 2023-01-05

## 2022-12-07 RX ORDER — FUROSEMIDE 40 MG
1 TABLET ORAL
Qty: 60 | Refills: 0
Start: 2022-12-07 | End: 2023-01-05

## 2022-12-07 RX ORDER — SPIRONOLACTONE 25 MG/1
0.5 TABLET, FILM COATED ORAL
Qty: 15 | Refills: 0
Start: 2022-12-07 | End: 2023-01-05

## 2022-12-07 RX ORDER — SENNA PLUS 8.6 MG/1
1 TABLET ORAL
Qty: 30 | Refills: 0
Start: 2022-12-07 | End: 2023-01-05

## 2022-12-07 RX ADMIN — Medication 81 MILLIGRAM(S): at 11:49

## 2022-12-07 RX ADMIN — SENNA PLUS 1 TABLET(S): 8.6 TABLET ORAL at 11:48

## 2022-12-07 RX ADMIN — SPIRONOLACTONE 12.5 MILLIGRAM(S): 25 TABLET, FILM COATED ORAL at 05:50

## 2022-12-07 RX ADMIN — SODIUM CHLORIDE 3 MILLILITER(S): 9 INJECTION INTRAMUSCULAR; INTRAVENOUS; SUBCUTANEOUS at 14:07

## 2022-12-07 RX ADMIN — HEPARIN SODIUM 7500 UNIT(S): 5000 INJECTION INTRAVENOUS; SUBCUTANEOUS at 05:50

## 2022-12-07 RX ADMIN — PANTOPRAZOLE SODIUM 40 MILLIGRAM(S): 20 TABLET, DELAYED RELEASE ORAL at 11:48

## 2022-12-07 RX ADMIN — Medication 12.5 MILLIGRAM(S): at 05:50

## 2022-12-07 RX ADMIN — POLYETHYLENE GLYCOL 3350 17 GRAM(S): 17 POWDER, FOR SOLUTION ORAL at 11:49

## 2022-12-07 RX ADMIN — SODIUM CHLORIDE 3 MILLILITER(S): 9 INJECTION INTRAMUSCULAR; INTRAVENOUS; SUBCUTANEOUS at 05:44

## 2022-12-07 RX ADMIN — Medication 40 MILLIGRAM(S): at 05:50

## 2022-12-07 NOTE — DISCHARGE NOTE NURSING/CASE MANAGEMENT/SOCIAL WORK - NSDCPEFALRISK_GEN_ALL_CORE
For information on Fall & Injury Prevention, visit: [email or web address] OR  [email or web address] OR  https://www.cdc.gov/steadi/patient.html

## 2022-12-07 NOTE — PROGRESS NOTE ADULT - SUBJECTIVE AND OBJECTIVE BOX
Patient discussed on morning rounds with Dr. Reilly    Operation / Date:  MV repair, CABG x 1 Lima-LAD, CHANDRIKA clip, biatrial cryomaze     Surgeon/Attending MD: Tommie    Referring Physician: Jean Palacios      SUBJECTIVE ASSESSMENT    Pt feels well. Denies CP or SOB. Did not require any narcotic pain meds over night. Ambulating and tolerating PO well. Had small BM this AM. Pt feels ready for DC      HOSPITAL COURSE:    61 y/o male PMH anxiety, morbid obesity, "fast heart rate" (not on blood thinners) who c/o SOB, N&V, night sweats and  palpitations for the last few days which progressively worsened. Patient was brought by EMS to [facility] and found to be in rapid atrial fibrillation -200, HTN with expiratory wheezing. Given Cardizem and albuterol with improvement. Placed on heparin drip at that time and given lasix after CXR showed pulmonary edema. Troponin found to be elevated, ruled in for NSTEMI. Cardiac cath completed and showed 2 vessel CAD mLAD 60% mCX 50% with EF 40% and medical management recommended. OLEG at OSH showed severe central MR. Patient was transferred to [facility] under the care of Dr. Reilly for surgical evaluation of his MR. Heparin gtt started, IV lasix started, amio loaded. TTE with EF 30%. Creatinine increased, IV lasix held. On 22 patient underwent MV repair, CABGx1, biatrial cryomaze EF 40%.  Patient arrived to CTICU on Levo, vaso, , sinus danny/junctional requiring pacing, received no products intraop.  POD 1 Extubated to Bipap.  POD 2 Weaned off vaso/levo.  POD 3 CT removed,  weaning down.  POD 4  weaned off, transferred to [facility]. POD 5 Hyponatremia improving. Diamox given for urine alkalization. Continuing in slow afib in the 40-50s V paced at 80. POD#6 Pace maker turned down and patient's blood pressure did not tolerate the slow rate. Returned to VVI 80. POD 7 VVI lowered to 60, pt with own native rhythm at 80 BPM, will continue to observe but unlikely candidate for PPM. POD 8 pt feeling well, ambulating frequently. Pt has maintained his own rhythm at apprx 65 BPM with stable BPs, no longer pacing. POD 9 still in NSR with stable BP. Pt feels well, feels ready for DC. Will go home with MCOT. Starting eliquis for Afib. Pt cleared for discharge by Dr. Reilly.    VITALS:  Vital Signs Last 24 Hrs  T(C): 35.6 (07 Dec 2022 08:31), Max: 36.2 (06 Dec 2022 21:49)  T(F): 96.1 (07 Dec 2022 08:31), Max: 97.1 (06 Dec 2022 21:49)  HR: 60 (07 Dec 2022 08:53) (58 - 70)  BP: 101/55 (07 Dec 2022 08:53) (101/55 - 160/88)  BP(mean): 73 (07 Dec 2022 08:53) (73 - 115)  RR: 17 (07 Dec 2022 08:53) (17 - 18)  SpO2: 95% (07 Dec 2022 08:53) (93% - 97%)    Parameters below as of 07 Dec 2022 08:53  Patient On (Oxygen Delivery Method): room air        EPICARDIAL WIRES REMOVED: YES  TIE DOWNS REMOVED: YES    PHYSICAL EXAM:  General: resting comfortably in chair in NAD  Neurological: AOx3. Motor skills grossly intact  Cardiovascular: Normal S1/S2. Regular rate/rhythm. No murmurs  Respiratory: Lungs CTA bilaterally. No wheezing or rales  Gastrointestinal: +BS in all 4 quadrants. Non-distended. Soft. Non-tender  Extremities: Strength 5/5 b/l upper/lower extremities. Sensation grossly intact upper/lower extremities. No edema. No calf tenderness.  Vascular: Radial 2+bilaterally, DP 2+ b/l  Incision Sites: Sternal incision healing well with no erythema or discharge      LABS:                        11.7   10.98 )-----------( 367      ( 07 Dec 2022 06:15 )             34.4     PT/INR - ( 07 Dec 2022 06:15 )   PT: 14.0 sec;   INR: 1.17          PTT - ( 07 Dec 2022 06:15 )  PTT:32.8 sec  12-07    134<L>  |  93<L>  |  27<H>  ----------------------------<  143<H>  4.1   |  30  |  1.48<H>    Ca    9.3      07 Dec 2022 06:15  Mg     2.5         TPro  7.4  /  Alb  3.7  /  TBili  1.1  /  DBili  x   /  AST  30  /  ALT  33  /  AlkPhos  95          DISCHARGE CXR:    CARDIAC SURGERY DISCHARGE CHECKLIST:     CABG        [ x ] Aspirin, [  ] Contraindicated, Reason_______________________________        [  ] Plavix, [ x ] Contraindicated, Reason_____Pt going home on eliquis for Afib____        [ x ] Statin, [  ] Contraindicated, Reason_______________________________        [x  ] Lasix, [  ] Contraindicated, Reason_______________________________         [ x ] Beta-Blocker, [  ] Contraindicated, Reason_______________________________       Surgical Valve        [x  ] Aspirin, [  ] Contraindicated, Reason_______________________________        [x  ] Statin, [  ] Contraindicated, Reason_______________________________        [ x ] Lasix, [  ] Contraindicated, Reason_______________________________        [ x ] Beta-Blocker, [  ] Contraindicated, Reason_______________________________

## 2022-12-07 NOTE — PROGRESS NOTE ADULT - PROVIDER SPECIALTY LIST ADULT
Critical Care
Critical Care
Electrophysiology
CT Surgery
CT Surgery
Critical Care
CT Surgery
Critical Care
Critical Care
CT Surgery

## 2022-12-07 NOTE — DISCHARGE NOTE NURSING/CASE MANAGEMENT/SOCIAL WORK - NSDCFUADDAPPT_GEN_ALL_CORE_FT
Our office will call you with the dates and times for your follow up appointments, but if you do not hear from our team by Friday, please call [phone]

## 2022-12-07 NOTE — PROGRESS NOTE ADULT - ASSESSMENT
Discharge Medications  acetaminophen 325 mg oral tablet: 2 tab(s) orally every 6 hours, As needed, for pain  aspirin 81 mg oral delayed release tablet: 1 tab(s) orally once a day  atorvastatin 80 mg oral tablet: 1 tab(s) orally once a day (at bedtime)  Eliquis 5 mg oral tablet: 1 tab(s) orally 2 times a day   furosemide 40 mg oral tablet: 1 tab(s) orally every 12 hours  metoprolol tartrate 25 mg oral tablet: 0.5 tab(s) orally 2 times a day   pantoprazole 40 mg oral delayed release tablet: 1 tab(s) orally once a day  polyethylene glycol 3350 oral powder for reconstitution: 17 gram(s) orally once a day  Rolling walker: ICD Code: I 25.1, I 34.0  HT: 185.4cm  Wt: 124.60kg  Rolling walker ICD i25:   senna leaf extract oral tablet: 1 tab(s) orally once a day  spironolactone 25 mg oral tablet: 0.5 tab(s) orally once a day    ,    ,      Care Plan/Procedures:  Discharge Diagnoses, Assessment and Plan of Treatment  PRINCIPAL DISCHARGE DIAGNOSIS  Diagnosis: Severe mitral valve regurgitation  Assessment and Plan of Treatment:    Discharge Procedures, Findings and Treatment  PRINCIPAL PROCEDURE  Procedure: CABG, with OLEG  Findings and Treatment: LIMA-LAD    Goal(s)  To get better and follow your care plan as instructed.      Follow Up:  Care Providers for Follow up (PCP/Outpatient Provider)  Tony Reilly)  Cardiovascular Surgery  [address] [place] [address]  Phone: [phone]  Fax: [phone]  Scheduled Appointment: 12/13/2022 11:15 AM    Jean Palacios ()  Cardiovascular Disease; Interventional Cardiology  [address], [address] [place] [address]  Phone: [phone]  Fax: [phone]  Follow Up Time: 2 weeks          Patient's Scheduled Appointments  Tony Reilly  [facility]  [facility] [address] [facility] [address]  Scheduled Appointment: 12/13/2022        Additional Scheduled Appointments  Our office will call you with the dates and times for your follow up appointments, but if you do not hear from our team by Friday, please call [phone]    Discharge Diet  DASH Diet    Activity  No heavy lifting/straining, Showering allowed, Walking - Indoors allowed, Walking - Outdoors allowed    Additional Instructions  You had a MCOT monitor (an external cardiac rhythm monitoring device) placed on your day of discharge.  This helps us monitor your heart while you are out of the hospital for 30 days after discharge. Should your heart go into an abnormal or dangerous rhythm you will receieve a call from the MCOT team and your Structural Heart team of Doctors and PA's will be notified.    1. Keep the monitor within 30 feet of you at all times.  2. When you feel any symptom (chest pain, dizziness, palpitations, weakness, fatigue or anything outside of your normal), press the “Record Symptoms” button on the main phone of your phone  3. Shower or exercise as normal whilewearing the MCOT Patch. Do not swim or take a bath. Patch is water-resistant, not waterproof  4. When the battery is low on the phone or on the device, use the supplied . The monitor will show a warning message when the battery is low.  5. Do not remove the patch from yourskin after you begin monitoring. With normal wear, each patch should last 5 days. To replace the patch follow instructions in the MCOT box with the Patch Guide  6. Any issues with the MCOT device or phone please call [facility] at [phone].  7. If you have any other questions at all please call the Structural Heart office at [phone]      -Walk daily as tolerated and use your incentive spirometer 10 times every hour while you are awake.     -Please weigh yourself daily. If you notice over a 3 pound weight gain in 3 days, this is a sign you are likely retaining too much fluid. It is imperative you call our right away with unexplained rapid weight gain.      -Please continue to wear the compression stockings given to you in the hospital at home. This is a way to prevent fluid from building up in your legs.     -No driving or strenuous activity/exercise until cleared by your surgeon. Do not sit in the front seat of a car.     -Gently clean your incisions with unscented/antibacterial soap and water, pat dry.  You may leave them open to air.    -Call your doctor if you have shortness of breath, chest pain not relieved by pain medication, dizziness, fever >101.5, or increased redness or drainage from incisions.

## 2022-12-07 NOTE — DISCHARGE NOTE NURSING/CASE MANAGEMENT/SOCIAL WORK - PATIENT PORTAL LINK FT
You can access the FollowMyHealth Patient Portal offered by [facility] by registering at the following website: [email or web address]. By joining [facility]’s FollowMyHealth portal, you will also be able to view your health information using other applications (apps) compatible with our system.

## 2022-12-08 ENCOUNTER — APPOINTMENT (OUTPATIENT)
Dept: CARE COORDINATION | Facility: HOME HEALTH | Age: 62
End: 2022-12-08

## 2022-12-08 VITALS — BODY MASS INDEX: 36.41 KG/M2 | WEIGHT: 274.7 LBS | HEIGHT: 72.99 IN

## 2022-12-08 DIAGNOSIS — Z86.59 PERSONAL HISTORY OF OTHER MENTAL AND BEHAVIORAL DISORDERS: ICD-10-CM

## 2022-12-08 DIAGNOSIS — Z87.898 PERSONAL HISTORY OF OTHER SPECIFIED CONDITIONS: ICD-10-CM

## 2022-12-08 PROCEDURE — 99024 POSTOP FOLLOW-UP VISIT: CPT

## 2022-12-08 RX ORDER — PANTOPRAZOLE 40 MG/1
40 TABLET, DELAYED RELEASE ORAL DAILY
Qty: 30 | Refills: 0 | Status: ACTIVE | COMMUNITY

## 2022-12-08 NOTE — COUNSELING
[Hygeine (Including Daily Shower)] : hygeine (including daily shower) [Importance of Regular Medical Follow-Up] : the importance of regular medical follow-up [No Heavy Lifting] : no heavy lifting (>15-20 lb. for 1 month or 25 lb. for 3 months from date of surgery) [Blood Pressure Control] : blood pressure control [S/S of infection] : signs and symptoms of infection (and to whom it should be reported) [Progressive Ambulation/Activity] : progressive ambulation/activity [Low Fat/Low Cholesterol Diet] : low fat/low cholesterol diet [Blood Sugar Control] : blood sugar control

## 2022-12-09 VITALS
DIASTOLIC BLOOD PRESSURE: 67 MMHG | HEART RATE: 69 BPM | OXYGEN SATURATION: 97 % | RESPIRATION RATE: 17 BRPM | SYSTOLIC BLOOD PRESSURE: 122 MMHG | TEMPERATURE: 98.2 F

## 2022-12-09 RX ORDER — ZOLPIDEM TARTRATE 5 MG/1
5 TABLET ORAL
Qty: 15 | Refills: 0 | Status: DISCONTINUED | COMMUNITY
Start: 2022-11-14

## 2022-12-09 RX ORDER — ESCITALOPRAM OXALATE 10 MG/1
10 TABLET ORAL
Qty: 90 | Refills: 0 | Status: DISCONTINUED | COMMUNITY
Start: 2022-04-29

## 2022-12-09 RX ORDER — ACETAMINOPHEN 325 MG/1
325 TABLET ORAL
Qty: 50 | Refills: 0 | Status: DISCONTINUED | COMMUNITY
Start: 2022-12-07

## 2022-12-09 RX ORDER — POLYETHYLENE GLYCOL 3350 17 G/17G
17 POWDER, FOR SOLUTION ORAL
Qty: 119 | Refills: 0 | Status: DISCONTINUED | COMMUNITY
Start: 2022-12-07

## 2022-12-09 RX ORDER — METOPROLOL SUCCINATE 25 MG/1
25 TABLET, EXTENDED RELEASE ORAL
Qty: 180 | Refills: 0 | Status: DISCONTINUED | COMMUNITY
Start: 2022-05-17

## 2022-12-09 RX ORDER — SENNOSIDES 8.6 MG TABLETS 8.6 MG/1
8.6 TABLET ORAL
Qty: 30 | Refills: 0 | Status: DISCONTINUED | COMMUNITY
Start: 2022-12-07

## 2022-12-09 NOTE — HISTORY OF PRESENT ILLNESS
[FreeTextEntry1] : FY: Etohum \[facility] 24 hour discharge follow-up and IHA \par \par Terry Ruelas is a 62 year old male PMH anxiety, morbid obesity, "fast heart rate" (not on blood thinners) who c/o SOB, N&V, night sweats and palpitations for the last few days which progressively worsened. Patient was brought by EMS to [facility] and found to be in rapid atrial fibrillation -200, HTN with expiratory wheezing. Given Cardizem and albuterol with improvement. Placed on heparin drip at that time and given lasix after CXR showed pulmonary edema. Troponin found to be elevated, ruled in for NSTEMI. Cardiac cath completed and showed 2 vessel CAD mLAD 60% mCX 50% with EF 40% and medical management recommended. OLEG at OSH showed severe central MR. Patient was transferred to [facility] under the care of Dr. Reilly for surgical evaluation of his MR. \par \par On 22 patient underwent MV repair, CABGx1, biatrial cryomaze EF 40%. Patient arrived to CTICU on Levo, vaso, . Intraop course with sinus danny/junctional requiring pacing, no PPM needed. Returned to NSR 70-80's. Discharged to home on 22 will go home with SARABJIT and Eliquis and [facility] services \par \par \par \par MSI, CT  sites no erythema, purulent exudate or edema noted, edges well approximated, pinpoint opening at distal end of MSI with scant serosanguineous drainage, no concerns for subcutaneous infection, no warmth/erythema, patient denies pain. \par \par \par \par

## 2022-12-09 NOTE — PHYSICAL EXAM
[Sclera] : the sclera and conjunctiva were normal [PERRL With Normal Accommodation] : pupils were equal in size, round, and reactive to light [Extraocular Movements] : extraocular movements were intact [Neck Appearance] : the appearance of the neck was normal [Neck Cervical Mass (___cm)] : no neck mass was observed [Jugular Venous Distention Increased] : there was no jugular-venous distention [Respiration, Rhythm And Depth] : normal respiratory rhythm and effort [Exaggerated Use Of Accessory Muscles For Inspiration] : no accessory muscle use [Auscultation Breath Sounds / Voice Sounds] : lungs were clear to auscultation bilaterally [Heart Rate And Rhythm] : heart rate was normal and rhythm regular [Heart Sounds] : normal S1 and S2 [Heart Sounds Pericardial Friction Rub] : no pericardial rub [Examination Of The Chest] : the chest was normal in appearance [Chest Visual Inspection Thoracic Asymmetry] : no chest asymmetry [Diminished Respiratory Excursion] : normal chest expansion [2+] : left 2+ [No Abnormalities] : the abdominal aorta was not enlarged and no bruit was heard [No Pulse Discrepancy] : no pulse discrepancy detected [Full Pulse] : peripheral pulses were full [Breast Appearance] : normal in appearance [Breast Palpation Mass] : no palpable masses [Bowel Sounds] : normal bowel sounds [Abdomen Soft] : soft [Abdomen Tenderness] : non-tender [Cervical Lymph Nodes Enlarged Posterior Bilaterally] : posterior cervical [Cervical Lymph Nodes Enlarged Anterior Bilaterally] : anterior cervical [No CVA Tenderness] : no ~M costovertebral angle tenderness [No Spinal Tenderness] : no spinal tenderness [Abnormal Walk] : normal gait [Nail Clubbing] : no clubbing  or cyanosis of the fingernails [Musculoskeletal - Swelling] : no joint swelling seen [Motor Tone] : muscle strength and tone were normal [Skin Color & Pigmentation] : normal skin color and pigmentation [Skin Turgor] : normal skin turgor [] : no rash [Deep Tendon Reflexes (DTR)] : deep tendon reflexes were 2+ and symmetric [Sensation] : the sensory exam was normal to light touch and pinprick [No Focal Deficits] : no focal deficits [Oriented To Time, Place, And Person] : oriented to person, place, and time [Impaired Insight] : insight and judgment were intact [Affect] : the affect was normal [Right Carotid Bruit] : no bruit heard over the right carotid [Left Carotid Bruit] : no bruit heard over the left carotid [Right Femoral Bruit] : no bruit heard over the right femoral artery [Left Femoral Bruit] : no bruit heard over the left femoral artery [Fingers] :  capillary refill of the fingers was normal [Left Fingers] :  capillary refill of the left fingers was normal [Right Fingers] :  capillary refill of the right fingers was normal [Toes] :  capillary refill of the toes was normal [Left Toes] :  capillary refill of the left toes was normal [Right Toes] :  capillary refill of the right toes was normal [FreeTextEntry1] : generalized bruising on BUE; MSI and CT sites c/d/i sternum stable, margins well approximated

## 2022-12-09 NOTE — ASSESSMENT
[FreeTextEntry1] : ASSESSMENT \par \par Patient recovering well at home s/p 1V CABG, MVR, CHANDRIKA and Bi Atrial Cryomaze, accompanied by spouse Chaya Ruelas. Reviewed all medications and dosages with patient understanding. Patient has all medications in home and is taking as prescribed. Pain controlled with current medication regimen. States hard bowel movements since intraop course, no other  symptoms, issues or concerns. Reports ambulating around home independently, without the use if assistive device. Denies chest pain, SOB/WARD, nausea/vomiting.\par \par MSI, CT  sites no erythema, purulent exudate or edema noted, edges well approximated, sternum stable, pinpoint opening at distal end of MSI with scant serosanguineous drainage, no warmth or signs of subcutaneous infection, patient denies pain/tenderness. \par \par Additonal instructions\par \par *increase Miralax to BID with Senna at night, increase hydration. Return to daily/prn once constipation resolves.\par \par Post-op Appointments \par \par Tony Reilly (MD) Cardiovascular Surgery: Scheduled Appointment: 12/13/2022 11:15 AM \par   \par Jean Palacios () Cardiovascular Disease; Interventional Cardiology: Follow Up Time: 2 weeks. Patient requests referral for Card within close proximity to home; referral sent for Card closer to home/[place] area.  \par \par [facility] RN services initiated  \par \par Advised to make follow-up appointment with PCP within 1 month of discharge. \par \par [facility] team will continue to follow up with patient's status. NP/CCC roles explained with patient understanding, contact information provided. Patient agrees to call with any questions, issues or concerns. Worsening symptoms reviewed with patient with reiteration and understanding.\par \par POST OP PLAN\par \par *Adhere to DASH diet\par \par *Do not drive or operate machinery, No heavy lifting/straining, Showering allowed, Stairs allowed, Walking - Indoors allowed, Walking - Outdoors allowed \par \par *Walk daily as tolerated and use your incentive spirometer 10 times every hour while you are awake. Walk around the apartment and transition slowly to stairs and outdoors as tolerated, avoid extreme temperatures when outdoors.\par  \par *Please weigh yourself daily. If you notice over a 3 pound weight gain in 3 days, this is a sign you are likely retaining too much fluid. It is imperative you call our right away with unexplained rapid weight gain. A scale was mailed to your home.\par  \par *Please continue to wear the compression stockings given to you in the hospital at home. This is a way to prevent fluid from building up in your legs. You can remove intermittently for breaks or to wash.\par  \par *No driving or strenuous activity/exercise until cleared by your surgeon. \par  \par *Gently clean your incisions with unscented/antibacterial soap and water, pat dry. You may leave them open to air. \par  \par *Call your doctor if you have shortness of breath, chest pain not relieved by pain medication, dizziness, fever >100.5, or increased redness or drainage from incisions. \par \par \par Sternal Precautions\par \par Sternal precautions are used to help protect your sternum (breastbone) after open chest surgery. Wires are placed during surgery to hold the sternum together as it heals. Sternal precautions help prevent the wires from cutting through the sternum. The precautions also help prevent the sternum from coming apart from an injury, and prevent pain and bleeding. You may need to use the precautions for up to 12 weeks after surgery.  It is important to follow the instructions carefully. An injury to the healing sternum can be life-threatening.\par \par General sternal precautions: Start slowly and do more as you get stronger. Pain medicine might make it harder for you to know when to slow down or be careful. Stop immediately if you hear a crunch or pop in your sternum.\par \par Protect your sternum. Hug a pillow to your chest or cross your arms over your chest when you laugh, sneeze, or cough.\par \par Be careful when you get into or out of a chair or bed. Hug a pillow or cross your arms when you stand or sit. Do not twist as you move. Use only your legs to sit and stand. You may need to use a raised toilet seat if you have trouble standing up without using your arms.\par \par Ask when you may take a bath or shower. You may need to use a bath chair if you have trouble getting into or out of the tub. Do not use a grab bar.\par \par Do not lift or carry anything heavier than 5 pounds. For example, a gallon of milk weighs 8 pounds.\par \par Keep your arms down as much as possible. Do not put your arms out to the side, behind you, or over your head. Do not let anyone pull your arms to help you move or dress. Do not reach for items.\par \par Do not push or pull anything. Examples include a car door or a vacuum .\par \par Do not drive while you are healing. Your surgeon will tell you when it is safe for you to start driving again.\par \par \par \par \par

## 2022-12-11 ENCOUNTER — TRANSCRIPTION ENCOUNTER (OUTPATIENT)
Age: 62
End: 2022-12-11

## 2022-12-11 RX ORDER — ZOLPIDEM TARTRATE 10 MG/1
1 TABLET ORAL
Qty: 7 | Refills: 0
Start: 2022-12-11

## 2022-12-12 ENCOUNTER — NON-APPOINTMENT (OUTPATIENT)
Age: 62
End: 2022-12-12

## 2022-12-12 DIAGNOSIS — I34.0 NONRHEUMATIC MITRAL (VALVE) INSUFFICIENCY: ICD-10-CM

## 2022-12-12 DIAGNOSIS — I48.19 OTHER PERSISTENT ATRIAL FIBRILLATION: ICD-10-CM

## 2022-12-12 DIAGNOSIS — R57.8 OTHER SHOCK: ICD-10-CM

## 2022-12-12 DIAGNOSIS — E87.3 ALKALOSIS: ICD-10-CM

## 2022-12-12 DIAGNOSIS — I50.21 ACUTE SYSTOLIC (CONGESTIVE) HEART FAILURE: ICD-10-CM

## 2022-12-12 DIAGNOSIS — I11.0 HYPERTENSIVE HEART DISEASE WITH HEART FAILURE: ICD-10-CM

## 2022-12-12 DIAGNOSIS — E87.5 HYPERKALEMIA: ICD-10-CM

## 2022-12-12 DIAGNOSIS — I21.4 NON-ST ELEVATION (NSTEMI) MYOCARDIAL INFARCTION: ICD-10-CM

## 2022-12-12 DIAGNOSIS — I25.10 ATHEROSCLEROTIC HEART DISEASE OF NATIVE CORONARY ARTERY WITHOUT ANGINA PECTORIS: ICD-10-CM

## 2022-12-12 DIAGNOSIS — D64.89 OTHER SPECIFIED ANEMIAS: ICD-10-CM

## 2022-12-12 DIAGNOSIS — R00.1 BRADYCARDIA, UNSPECIFIED: ICD-10-CM

## 2022-12-12 DIAGNOSIS — R57.0 CARDIOGENIC SHOCK: ICD-10-CM

## 2022-12-12 DIAGNOSIS — F41.9 ANXIETY DISORDER, UNSPECIFIED: ICD-10-CM

## 2022-12-12 DIAGNOSIS — E87.1 HYPO-OSMOLALITY AND HYPONATREMIA: ICD-10-CM

## 2022-12-12 DIAGNOSIS — E66.01 MORBID (SEVERE) OBESITY DUE TO EXCESS CALORIES: ICD-10-CM

## 2022-12-13 ENCOUNTER — OUTPATIENT (OUTPATIENT)
Dept: OUTPATIENT SERVICES | Facility: HOSPITAL | Age: 62
LOS: 1 days | End: 2022-12-13
Payer: COMMERCIAL

## 2022-12-13 ENCOUNTER — APPOINTMENT (OUTPATIENT)
Dept: CARDIOTHORACIC SURGERY | Facility: CLINIC | Age: 62
End: 2022-12-13

## 2022-12-13 VITALS
DIASTOLIC BLOOD PRESSURE: 86 MMHG | HEART RATE: 72 BPM | OXYGEN SATURATION: 98 % | TEMPERATURE: 97.7 F | HEIGHT: 72 IN | WEIGHT: 256 LBS | SYSTOLIC BLOOD PRESSURE: 142 MMHG | BODY MASS INDEX: 34.67 KG/M2 | RESPIRATION RATE: 18 BRPM

## 2022-12-13 PROCEDURE — 71046 X-RAY EXAM CHEST 2 VIEWS: CPT

## 2022-12-13 PROCEDURE — 71046 X-RAY EXAM CHEST 2 VIEWS: CPT | Mod: 26

## 2022-12-13 PROCEDURE — 99024 POSTOP FOLLOW-UP VISIT: CPT

## 2022-12-13 RX ORDER — METOPROLOL TARTRATE 25 MG/1
25 TABLET, FILM COATED ORAL
Qty: 30 | Refills: 1 | Status: COMPLETED | COMMUNITY
End: 2022-12-13

## 2022-12-15 NOTE — END OF VISIT
[FreeTextEntry3] : I, RAMIRO ELKINS , am scribing for and in the presence of MICHELLE LANCE the following sections: History of present illness, past Medical/family/surgical/family/social history, review of systems, vital signs, physical exam and disposition.\par I personally performed the services described in the documentation, reviewed the documentation recorded by the scribe in my presence and it accurately and completely records my words and actions.\par

## 2022-12-15 NOTE — REASON FOR VISIT
[de-identified] : MVrepair, CABG x 1, CryoMaze Ablation, CHANDRIKA AtriClip, EF 40% [de-identified] : 11/28/22 [de-identified] : Intraop uncomplicated.  Patient arrived to CTICU on Levo, vaso, , sinus danny/junctional requiring pacing, received no products intraop.  POD 1 Extubated to Bipap.  POD 2 Weaned off vaso/levo.   POD 4 Dobut weaned off, transferred to [facility]. POD 5 Hyponatremia improving. Diamox given for urine alkalization. Continuing in slow afib in the 40-50s V paced at 80. POD#6 Pace maker turned down and patient's blood pressure did not \par tolerate the slow rate. Returned to VVI 80. POD 7 VVI lowered to 60, pt with own native rhythm at 80 BPM, will continue to observe but unlikely candidate for PPM. POD 8 pt feeling well, ambulating frequently. Pt has maintained his own rhythm at apprx 65 BPM with stable BPs, no longer pacing. POD 9 still in NSR with stable BP.  MCOT place. Started Eliquis. Discharged home on 22. Patient presents for post op visit accompanied by his wife. He appears generally well, denies c/o chest pain, sob/guerrier, fever, lower extremity edema or palpitations.\par MCOT->AF for 19 minutes on  and 8 hours on \par Chest Xray today w/trace left effusion\par

## 2022-12-15 NOTE — PHYSICAL EXAM

## 2022-12-15 NOTE — DISCUSSION/SUMMARY
[Doing Well] : is doing well [1] : 1 [FreeTextEntry1] : Plan:\par Continue current medication regimen.\par Continue to increase activity and walk daily as tolerated. Continue to use incentive spirometer. \par No driving or strenuous activity for 4 weeks after surgery. Avoid lifting >10 to 15 lbs.\par Call MD if you experience fever, fatigue, dizziness, confusion, syncope, shortness of breath, chest pain not relieved with analgesics, increased redness/drainage from incision.\par Follow up with Dr. Flores--cardiology @ [facility]\par decrease lasix to daily\par change metoprolol to succinate 25mg daily\par counseled re need for antibiotics prior to dental and surgical procedures\par Follow up in CTS clinic as needed\par \par

## 2022-12-20 DIAGNOSIS — I51.7 CARDIOMEGALY: ICD-10-CM

## 2022-12-20 DIAGNOSIS — I25.2 OLD MYOCARDIAL INFARCTION: ICD-10-CM

## 2022-12-20 DIAGNOSIS — R00.2 PALPITATIONS: ICD-10-CM

## 2022-12-20 DIAGNOSIS — R73.03 PREDIABETES.: ICD-10-CM

## 2022-12-20 DIAGNOSIS — Z87.898 PERSONAL HISTORY OF OTHER SPECIFIED CONDITIONS: ICD-10-CM

## 2022-12-20 RX ORDER — SENNOSIDES 8.6 MG/1
8.6 TABLET ORAL DAILY
Refills: 0 | Status: ACTIVE | COMMUNITY

## 2022-12-20 RX ORDER — LORATADINE 10 MG
17 TABLET,DISINTEGRATING ORAL TWICE DAILY
Refills: 0 | Status: DISCONTINUED | COMMUNITY
End: 2022-12-20

## 2022-12-22 ENCOUNTER — APPOINTMENT (OUTPATIENT)
Dept: CARDIOLOGY | Facility: CLINIC | Age: 62
End: 2022-12-22

## 2022-12-22 ENCOUNTER — NON-APPOINTMENT (OUTPATIENT)
Age: 62
End: 2022-12-22

## 2022-12-22 VITALS — HEART RATE: 123 BPM | WEIGHT: 250 LBS | HEIGHT: 72 IN | OXYGEN SATURATION: 99 % | BODY MASS INDEX: 33.86 KG/M2

## 2022-12-22 VITALS — SYSTOLIC BLOOD PRESSURE: 98 MMHG | DIASTOLIC BLOOD PRESSURE: 61 MMHG

## 2022-12-22 PROCEDURE — 93000 ELECTROCARDIOGRAM COMPLETE: CPT

## 2022-12-22 PROCEDURE — 99205 OFFICE O/P NEW HI 60 MIN: CPT | Mod: 25

## 2022-12-25 NOTE — PHYSICAL EXAM
[Well Developed] : well developed [Well Nourished] : well nourished [No Acute Distress] : no acute distress [Obese] : obese [Normal Conjunctiva] : normal conjunctiva [Normal Venous Pressure] : normal venous pressure [No Carotid Bruit] : no carotid bruit [No Murmur] : no murmur [No Rub] : no rub [No Gallop] : no gallop [Clear Lung Fields] : clear lung fields [Good Air Entry] : good air entry [No Respiratory Distress] : no respiratory distress  [Soft] : abdomen soft [Non Tender] : non-tender [Normal Gait] : normal gait [No Edema] : no edema [No Cyanosis] : no cyanosis [No Rash] : no rash [No Skin Lesions] : no skin lesions [Moves all extremities] : moves all extremities [No Focal Deficits] : no focal deficits [Normal Speech] : normal speech [Alert and Oriented] : alert and oriented [de-identified] : irregularly irregular

## 2022-12-25 NOTE — CARDIOLOGY SUMMARY
[de-identified] : \par 12/22/22 - atrial fibrillation with rapid ventricular response, 123 bpm, occasional PVC, nonspecific ST abnormality\par  [de-identified] : \par 11/25/22 - mod LAE, mild MR, severe global LV systolic dysfunction, normal RV size and function, PASP 29 mmHg, LVEF 30%\par  [de-identified] : \par 11/28/22 (CABG + MV repair) - LIMA-LAD, MV repair, CHANDRIKA occlusion, and extensive biatrial CryoMaze ablation by Tony Reilly MD

## 2022-12-25 NOTE — DISCUSSION/SUMMARY
[Coronary Artery Disease] : coronary artery disease [Atrial Fibrillation] : atrial fibrillation [Uncontrolled Ventricular Response] : uncontrolled ventricular response [Stable] : stable [Medication Changes Per Orders] : Medication changes are as documented in orders [FreeTextEntry1] : \par Currently stable from a cardiovascular standpoint. Normotensive. Systolic heart failure possibly from nonischemic cardiomyopathy (LVEF 30%). Currently appears relatively euvolemic. Stable CAD (s/p CABG x1 vessel). s/p MV repair, cryoablation MAZE and CHANDRIKA closure. Currently in atrial fibrillation with rapid ventricular response. Will increase metoprolol succinate 25 mg to twice daily for rate control. Continue all other current medications. ECG completed today and reviewed (findings as noted above). Prior available cardiac records reviewed. Follow up in 2 weeks. [EKG obtained to assist in diagnosis and management of assessed problem(s)] : EKG obtained to assist in diagnosis and management of assessed problem(s)

## 2022-12-25 NOTE — HISTORY OF PRESENT ILLNESS
[FreeTextEntry1] : Doing okay. Feels tired and has trouble sleeping at night. Appetite has not been good. Denies chest pain or palpitations. Has some shortness of breath at times. Has a cough.

## 2022-12-25 NOTE — REVIEW OF SYSTEMS
[Negative] : Musculoskeletal [SOB] : shortness of breath [Dyspnea on exertion] : dyspnea during exertion [Chest Discomfort] : no chest discomfort [Lower Ext Edema] : no extremity edema [Leg Claudication] : no intermittent leg claudication [Palpitations] : no palpitations [Orthopnea] : no orthopnea [PND] : no PND [Syncope] : no syncope

## 2023-01-09 ENCOUNTER — TRANSCRIPTION ENCOUNTER (OUTPATIENT)
Age: 63
End: 2023-01-09

## 2023-01-19 ENCOUNTER — APPOINTMENT (OUTPATIENT)
Dept: CARDIOLOGY | Facility: CLINIC | Age: 63
End: 2023-01-19
Payer: COMMERCIAL

## 2023-01-19 ENCOUNTER — NON-APPOINTMENT (OUTPATIENT)
Age: 63
End: 2023-01-19

## 2023-01-19 VITALS
HEART RATE: 73 BPM | SYSTOLIC BLOOD PRESSURE: 128 MMHG | WEIGHT: 249 LBS | DIASTOLIC BLOOD PRESSURE: 82 MMHG | OXYGEN SATURATION: 100 % | BODY MASS INDEX: 33.72 KG/M2 | HEIGHT: 72 IN

## 2023-01-19 DIAGNOSIS — Z98.890 OTHER SPECIFIED POSTPROCEDURAL STATES: ICD-10-CM

## 2023-01-19 DIAGNOSIS — I48.91 UNSPECIFIED ATRIAL FIBRILLATION: ICD-10-CM

## 2023-01-19 DIAGNOSIS — Z09 ENCOUNTER FOR FOLLOW-UP EXAMINATION AFTER COMPLETED TREATMENT FOR CONDITIONS OTHER THAN MALIGNANT NEOPLASM: ICD-10-CM

## 2023-01-19 DIAGNOSIS — Z95.1 PRESENCE OF AORTOCORONARY BYPASS GRAFT: ICD-10-CM

## 2023-01-19 PROCEDURE — 99214 OFFICE O/P EST MOD 30 MIN: CPT

## 2023-01-19 PROCEDURE — 99072 ADDL SUPL MATRL&STAF TM PHE: CPT

## 2023-01-19 PROCEDURE — 93000 ELECTROCARDIOGRAM COMPLETE: CPT

## 2023-01-20 LAB
ANION GAP SERPL CALC-SCNC: 17 MMOL/L
BUN SERPL-MCNC: 15 MG/DL
CALCIUM SERPL-MCNC: 9.6 MG/DL
CHLORIDE SERPL-SCNC: 101 MMOL/L
CO2 SERPL-SCNC: 25 MMOL/L
CREAT SERPL-MCNC: 1.23 MG/DL
EGFR: 66 ML/MIN/1.73M2
GLUCOSE SERPL-MCNC: 72 MG/DL
POTASSIUM SERPL-SCNC: 4.6 MMOL/L
SODIUM SERPL-SCNC: 143 MMOL/L

## 2023-01-23 PROBLEM — Z98.890 S/P MITRAL VALVE REPAIR: Status: RESOLVED | Noted: 2022-12-07 | Resolved: 2023-01-23

## 2023-01-23 PROBLEM — Z09 POSTOP CHECK: Status: RESOLVED | Noted: 2022-12-07 | Resolved: 2023-01-23

## 2023-01-23 PROBLEM — Z95.1 S/P CABG X 1: Status: RESOLVED | Noted: 2022-12-07 | Resolved: 2023-01-23

## 2023-01-23 PROBLEM — I48.91 ATRIAL FIBRILLATION: Noted: 2022-12-20

## 2023-01-23 PROBLEM — Z98.890 S/P LEFT ATRIAL APPENDAGE LIGATION: Status: RESOLVED | Noted: 2022-12-07 | Resolved: 2023-01-23

## 2023-01-23 NOTE — REVIEW OF SYSTEMS
[SOB] : no shortness of breath [Dyspnea on exertion] : not dyspnea during exertion [Chest Discomfort] : chest discomfort [Lower Ext Edema] : no extremity edema [Leg Claudication] : no intermittent leg claudication [Palpitations] : palpitations [Orthopnea] : no orthopnea [PND] : no PND [Syncope] : no syncope [Negative] : Musculoskeletal

## 2023-01-23 NOTE — HISTORY OF PRESENT ILLNESS
[FreeTextEntry1] : Doing okay. Overall feeling better. Denies chest pain or shortness of breath. Feels occasional palpitations but much less than before. Still has an occasional cough which leads to some chest soreness. Has trouble sleeping at night due to anxiety.

## 2023-01-23 NOTE — PHYSICAL EXAM
[facility]  *** FINAL REPORT ***    Name: Willie Chou  MRN: [id number]    Inpatient  : 1939  HIS Order #: [id number]  [address] [facility] Visit #: [id number]  Date: 2018    TYPE OF TEST: Peripheral Venous Testing    REASON FOR TEST  Pain in limb, Limb swelling    Left Leg:-  Deep venous thrombosis:           No  Superficial venous thrombosis:    Not examined  Deep venous insufficiency:        Not examined  Superficial venous insufficiency: Not examined      INTERPRETATION/FINDINGS  Duplex images were obtained using 2-D gray scale, color flow, and  spectral Doppler analysis. Left leg :  1. Deep vein(s) visualized include the common femoral, deep femoral,  proximal femoral, mid femoral, distal femoral, popliteal(above knee),  popliteal(fossa), popliteal(below knee), posterior tibial and peroneal   veins. 2. No evidence of deep venous thrombosis detected in the veins  visualized. 3. No evidence of deep vein thrombosis in the contralateral common  femoral vein. ADDITIONAL COMMENTS  Left greater saphenous vein not visualized due to vein stripping    I have personally reviewed the data relevant to the interpretation of  this  study. TECHNOLOGIST: Marce Rodrigues  Signed: 2018 07:45 AM    PHYSICIAN: Mike Florence MD  Signed: 2018 03:26 PM [Well Developed] : well developed [Well Nourished] : well nourished [No Acute Distress] : no acute distress [Obese] : obese [Normal Conjunctiva] : normal conjunctiva [Normal Venous Pressure] : normal venous pressure [No Carotid Bruit] : no carotid bruit [Normal S1, S2] : normal S1, S2 [No Murmur] : no murmur [No Rub] : no rub [No Gallop] : no gallop [Clear Lung Fields] : clear lung fields [Good Air Entry] : good air entry [No Respiratory Distress] : no respiratory distress  [Soft] : abdomen soft [Non Tender] : non-tender [Normal Gait] : normal gait [No Edema] : no edema [No Cyanosis] : no cyanosis [No Rash] : no rash [No Skin Lesions] : no skin lesions [Moves all extremities] : moves all extremities [No Focal Deficits] : no focal deficits [Normal Speech] : normal speech [Alert and Oriented] : alert and oriented

## 2023-01-23 NOTE — DISCUSSION/SUMMARY
[Paroxysmal Atrial Fibrillation] : paroxysmal atrial fibrillation [Uncontrolled Ventricular Response] : uncontrolled ventricular response [Medication Changes Per Orders] : Medication changes are as documented in orders [Coronary Artery Disease] : coronary artery disease [Systolic Heart Failure] : systolic heart failure [Stable] : stable [Compensated] : compensated [FreeTextEntry1] : \par Currently stable from a cardiovascular standpoint. Normotensive. Systolic heart failure likely from nonischemic cardiomyopathy (LVEF 30%). Currently appears relatively euvolemic. Stable CAD (s/p CABG x1 vessel). s/p MV repair, cryoablation MAZE and CHANDRIKA closure. Had atrial fibrillation with rapid ventricular response on last visit. Currently in sinus rhythm. Will start losartan 25 mg half tablet daily for management of HFrEF. Continue all other current medications including metoprolol succinate twice daily, apixaban, and current furosemide dose. ECG completed today and reviewed (findings as noted above). Daily weight advised. Will check BMP today. Follow up in 2 months. [EKG obtained to assist in diagnosis and management of assessed problem(s)] : EKG obtained to assist in diagnosis and management of assessed problem(s)

## 2023-01-23 NOTE — CARDIOLOGY SUMMARY
[de-identified] : \par 01/19/23 - normal sinus rhythm, 73 bpm, low voltage in precordial leads\par 12/22/22 - atrial fibrillation with rapid ventricular response, 123 bpm, occasional PVC, nonspecific ST abnormality\par  [de-identified] : \par 11/25/22 - mod LAE, mild MR, severe global LV systolic dysfunction, normal RV size and function, PASP 29 mmHg, LVEF 30%\par  [de-identified] : \par 11/28/22 (CABG + MV repair) - LIMA-LAD, MV repair, CHANDRIKA occlusion, and extensive biatrial CryoMaze ablation by Tony Reilly MD

## 2023-02-10 ENCOUNTER — RX CHANGE (OUTPATIENT)
Age: 63
End: 2023-02-10

## 2023-02-10 RX ORDER — LOSARTAN POTASSIUM 25 MG/1
25 TABLET, FILM COATED ORAL DAILY
Qty: 30 | Refills: 5 | Status: DISCONTINUED | COMMUNITY
Start: 2023-01-19 | End: 2023-02-10

## 2023-03-30 ENCOUNTER — APPOINTMENT (OUTPATIENT)
Dept: CARDIOLOGY | Facility: CLINIC | Age: 63
End: 2023-03-30
Payer: MEDICARE

## 2023-03-30 ENCOUNTER — NON-APPOINTMENT (OUTPATIENT)
Age: 63
End: 2023-03-30

## 2023-03-30 VITALS
DIASTOLIC BLOOD PRESSURE: 80 MMHG | SYSTOLIC BLOOD PRESSURE: 129 MMHG | OXYGEN SATURATION: 97 % | TEMPERATURE: 97.7 F | HEART RATE: 58 BPM | BODY MASS INDEX: 33.86 KG/M2 | HEIGHT: 72 IN | WEIGHT: 250 LBS

## 2023-03-30 PROCEDURE — 99072 ADDL SUPL MATRL&STAF TM PHE: CPT

## 2023-03-30 PROCEDURE — 99214 OFFICE O/P EST MOD 30 MIN: CPT

## 2023-03-30 PROCEDURE — 93000 ELECTROCARDIOGRAM COMPLETE: CPT

## 2023-03-30 NOTE — PHYSICAL EXAM
[Well Developed] : well developed [Well Nourished] : well nourished [No Acute Distress] : no acute distress [Obese] : obese [Normal Conjunctiva] : normal conjunctiva [Normal Venous Pressure] : normal venous pressure [Normal S1, S2] : normal S1, S2 [No Carotid Bruit] : no carotid bruit [No Murmur] : no murmur [No Rub] : no rub [No Gallop] : no gallop [Clear Lung Fields] : clear lung fields [Good Air Entry] : good air entry [No Respiratory Distress] : no respiratory distress  [Soft] : abdomen soft [Normal Gait] : normal gait [Non Tender] : non-tender [No Edema] : no edema [No Cyanosis] : no cyanosis [No Rash] : no rash [No Skin Lesions] : no skin lesions [Moves all extremities] : moves all extremities [No Focal Deficits] : no focal deficits [Normal Speech] : normal speech [Alert and Oriented] : alert and oriented

## 2023-04-02 NOTE — CARDIOLOGY SUMMARY
[de-identified] : \par 03/30/23 - sinus rhythm, frequent PVCs (bigeminy)\par 01/19/23 - normal sinus rhythm, 73 bpm, low voltage in precordial leads\par 12/22/22 - atrial fibrillation with rapid ventricular response, 123 bpm, occasional PVC, nonspecific ST abnormality\par  [de-identified] : \par 11/25/22 - mod LAE, mild MR, severe global LV systolic dysfunction, normal RV size and function, PASP 29 mmHg, LVEF 30%\par  [de-identified] : \par 11/28/22 (CABG + MV repair) - LIMA-LAD, MV repair, CHANDRIKA occlusion, and extensive biatrial CryoMaze ablation by Tony Reilly MD

## 2023-04-02 NOTE — DISCUSSION/SUMMARY
[Paroxysmal Atrial Fibrillation] : paroxysmal atrial fibrillation [Uncontrolled Ventricular Response] : uncontrolled ventricular response [Medication Changes Per Orders] : Medication changes are as documented in orders [Coronary Artery Disease] : coronary artery disease [Systolic Heart Failure] : systolic heart failure [Stable] : stable [Compensated] : compensated [FreeTextEntry1] : \par Currently stable from a cardiovascular standpoint. Normotensive. Systolic heart failure likely from nonischemic cardiomyopathy (LVEF 30%). Currently appears relatively euvolemic. Stable CAD (s/p CABG x1 vessel). s/p MV repair, cryoablation MAZE and CHANDRIKA closure. History of paroxysmal atrial fibrillation post-op. Currently in sinus rhythm. Will change metoprolol succinate to 50 mg daily for management of PVCs.. Continue all other current medications including losartan, apixaban, and current furosemide dose. ECG completed today and reviewed (findings as noted above). Daily weight advised. Follow up in 3 months. [EKG obtained to assist in diagnosis and management of assessed problem(s)] : EKG obtained to assist in diagnosis and management of assessed problem(s)

## 2023-04-02 NOTE — HISTORY OF PRESENT ILLNESS
[FreeTextEntry1] : Currently doing well. Denies chest pain, shortness of breath or palpitations. Feeling stronger.

## 2023-05-23 NOTE — H&P ADULT - NSHPPHYSICALEXAM_GEN_ALL_CORE
Patient comes to clinic for follow up anticoagulation visit.  Last INR on 5/25/23 was 2.1.  Dose maintained.   Today's INR is 2.8 and is within goal range.    Current warfarin total weekly dose of 28.5 mg verified.  Informed the INR result is within therapeutic range and instructed to maintain current dose per protocol. Discussed dose and return date of 6/20/23 for next INR. See Anticoagulation flowsheet.    MARCUS Ravi  is in the office today supervising the treatment.    Instructed to contact the clinic with any unusual bleeding or bruising, any changes in medications, diet, health status, lifestyle, or any other changes, questions or concerns. Verbalized understanding of all discussed.     
Physical Exam  CONSTITUTIONAL:                                                              WNL  NEURO:                                                                       WNL                      EYES:                                                                                WNL  ENMT:                                                                               WNL  CV:                                                                                   irregular heart beat/ systolic murmur 4/6  RESPIRATORY:                                                                 WNL  GI:                                                                                     WNL  : HUERTA + / -                                                                  WNL  MUSCULOSKELETAL:                                                       WNL  SKIN / BREAST:                                                                  WNL  EXTREMITIES:                                                                 WNL    ICU Vital Signs Last 24 Hrs  T(C): 36.3 (23 Nov 2022 21:23), Max: 36.3 (23 Nov 2022 21:23)  T(F): 97.4 (23 Nov 2022 21:23), Max: 97.4 (23 Nov 2022 21:23)  HR: 104 (23 Nov 2022 20:19) (104 - 107)  BP: 157/84 (23 Nov 2022 20:19) (142/83 - 157/84)  BP(mean): 107 (23 Nov 2022 20:19) (103 - 107)  ABP: --  ABP(mean): --  RR: 18 (23 Nov 2022 20:19) (18 - 18)  SpO2: 94% (23 Nov 2022 20:19) (94% - 95%)    O2 Parameters below as of 23 Nov 2022 20:19  Patient On (Oxygen Delivery Method): room air

## 2023-06-29 ENCOUNTER — NON-APPOINTMENT (OUTPATIENT)
Age: 63
End: 2023-06-29

## 2023-06-29 ENCOUNTER — APPOINTMENT (OUTPATIENT)
Dept: CARDIOLOGY | Facility: CLINIC | Age: 63
End: 2023-06-29
Payer: SELF-PAY

## 2023-06-29 VITALS
HEART RATE: 74 BPM | WEIGHT: 255 LBS | TEMPERATURE: 97.7 F | BODY MASS INDEX: 34.54 KG/M2 | OXYGEN SATURATION: 95 % | HEIGHT: 72 IN | SYSTOLIC BLOOD PRESSURE: 163 MMHG | DIASTOLIC BLOOD PRESSURE: 71 MMHG

## 2023-06-29 VITALS — DIASTOLIC BLOOD PRESSURE: 85 MMHG | SYSTOLIC BLOOD PRESSURE: 131 MMHG

## 2023-06-29 PROCEDURE — 99214 OFFICE O/P EST MOD 30 MIN: CPT

## 2023-06-29 PROCEDURE — 93000 ELECTROCARDIOGRAM COMPLETE: CPT

## 2023-06-29 NOTE — DISCUSSION/SUMMARY
[Paroxysmal Atrial Fibrillation] : paroxysmal atrial fibrillation [Uncontrolled Ventricular Response] : uncontrolled ventricular response [Medication Changes Per Orders] : Medication changes are as documented in orders [Coronary Artery Disease] : coronary artery disease [Systolic Heart Failure] : systolic heart failure [Stable] : stable [Compensated] : compensated [FreeTextEntry1] : \par Currently stable from a cardiovascular standpoint. Normotensive. Systolic heart failure likely from nonischemic cardiomyopathy (LVEF 30%). Currently appears relatively euvolemic. Stable CAD (s/p CABG x1 vessel). s/p MV repair, cryoablation MAZE and CHANDRIKA closure. History of paroxysmal atrial fibrillation post-op. Currently in sinus rhythm. Frequent PVCs. Will add metoprolol succinate 25 mg in the evening for management of PVCs.. Continue all other current medications including losartan, apixaban, and current furosemide dose. ECG completed today and reviewed (findings as noted above). Low salt diet and daily weights advised. Follow up in 4 months. Will repeat echo after our next visit. [EKG obtained to assist in diagnosis and management of assessed problem(s)] : EKG obtained to assist in diagnosis and management of assessed problem(s)

## 2023-06-29 NOTE — REVIEW OF SYSTEMS
[SOB] : no shortness of breath [Dyspnea on exertion] : not dyspnea during exertion [Chest Discomfort] : no chest discomfort [Lower Ext Edema] : no extremity edema [Leg Claudication] : no intermittent leg claudication [Palpitations] : palpitations [Orthopnea] : no orthopnea [PND] : no PND [Syncope] : no syncope [Negative] : Musculoskeletal

## 2023-06-29 NOTE — CARDIOLOGY SUMMARY
[de-identified] : \par 06/29/23 - normal sinus rhythm, 91 bpm, frequent PVCs (bigeminy)\par  [de-identified] : \par 11/25/22 - mod LAE, mild MR, severe global LV systolic dysfunction, normal RV size and function, PASP 29 mmHg, LVEF 30%\par  [de-identified] : \par 11/28/22 (CABG + MV repair) - LIMA-LAD, MV repair, CHANDRIKA occlusion, and extensive biatrial CryoMaze ablation by Tony Reilly MD

## 2023-06-29 NOTE — HISTORY OF PRESENT ILLNESS
[FreeTextEntry1] : Currently doing well. Denies chest pain or shortness of breath. Occasional feeling of palpitations in the evening.

## 2023-12-20 RX ORDER — ATORVASTATIN CALCIUM 80 MG/1
80 TABLET, FILM COATED ORAL
Qty: 90 | Refills: 3 | Status: ACTIVE | COMMUNITY
Start: 1900-01-01 | End: 1900-01-01

## 2023-12-20 RX ORDER — SPIRONOLACTONE 25 MG/1
25 TABLET ORAL DAILY
Qty: 45 | Refills: 3 | Status: ACTIVE | COMMUNITY
Start: 1900-01-01 | End: 1900-01-01

## 2023-12-20 RX ORDER — METOPROLOL SUCCINATE 50 MG/1
50 TABLET, EXTENDED RELEASE ORAL DAILY
Qty: 90 | Refills: 3 | Status: ACTIVE | COMMUNITY
Start: 1900-01-01 | End: 1900-01-01

## 2023-12-20 RX ORDER — FUROSEMIDE 40 MG/1
40 TABLET ORAL DAILY
Qty: 90 | Refills: 3 | Status: ACTIVE | COMMUNITY
Start: 1900-01-01 | End: 1900-01-01

## 2023-12-20 RX ORDER — APIXABAN 5 MG/1
5 TABLET, FILM COATED ORAL
Qty: 180 | Refills: 3 | Status: ACTIVE | COMMUNITY
Start: 1900-01-01 | End: 1900-01-01

## 2023-12-20 RX ORDER — ASPIRIN 81 MG
81 TABLET, DELAYED RELEASE (ENTERIC COATED) ORAL DAILY
Qty: 90 | Refills: 3 | Status: ACTIVE | COMMUNITY
Start: 1900-01-01 | End: 1900-01-01

## 2023-12-21 ENCOUNTER — APPOINTMENT (OUTPATIENT)
Dept: CARDIOLOGY | Facility: CLINIC | Age: 63
End: 2023-12-21

## 2024-01-11 ENCOUNTER — APPOINTMENT (OUTPATIENT)
Dept: CARDIOLOGY | Facility: CLINIC | Age: 64
End: 2024-01-11
Payer: COMMERCIAL

## 2024-01-11 ENCOUNTER — NON-APPOINTMENT (OUTPATIENT)
Age: 64
End: 2024-01-11

## 2024-01-11 VITALS
BODY MASS INDEX: 34.54 KG/M2 | WEIGHT: 255 LBS | DIASTOLIC BLOOD PRESSURE: 92 MMHG | TEMPERATURE: 97 F | SYSTOLIC BLOOD PRESSURE: 146 MMHG | OXYGEN SATURATION: 97 % | HEART RATE: 54 BPM | HEIGHT: 72 IN

## 2024-01-11 PROCEDURE — 99214 OFFICE O/P EST MOD 30 MIN: CPT

## 2024-01-11 PROCEDURE — 93000 ELECTROCARDIOGRAM COMPLETE: CPT

## 2024-01-11 RX ORDER — METOPROLOL SUCCINATE 25 MG/1
25 TABLET, EXTENDED RELEASE ORAL DAILY
Qty: 90 | Refills: 3 | Status: ACTIVE | COMMUNITY
Start: 2024-01-11 | End: 1900-01-01

## 2024-01-17 NOTE — DISCUSSION/SUMMARY
[Paroxysmal Atrial Fibrillation] : paroxysmal atrial fibrillation [Uncontrolled Ventricular Response] : uncontrolled ventricular response [Medication Changes Per Orders] : Medication changes are as documented in orders [Coronary Artery Disease] : coronary artery disease [Systolic Heart Failure] : systolic heart failure [Stable] : stable [Compensated] : compensated [FreeTextEntry1] : Currently stable from a cardiovascular standpoint. Hypertensive today. Systolic heart failure likely from nonischemic cardiomyopathy (LVEF 30%). Currently appears euvolemic. Stable CAD (s/p CABG x1 vessel). s/p MV repair, cryoablation MAZE and CHANDRIKA closure. History of paroxysmal atrial fibrillation post-op. Currently in sinus rhythm with frequent PVCs. Continue current medications including metoprolol succinate twice daily (50 mg in AM, 25 mg in PM), losartan, apixaban, and furosemide 40 mg. ECG completed today and reviewed (findings as noted above). Low salt diet and daily weights advised. Follow up in 4 months. [EKG obtained to assist in diagnosis and management of assessed problem(s)] : EKG obtained to assist in diagnosis and management of assessed problem(s)

## 2024-01-17 NOTE — CARDIOLOGY SUMMARY
[de-identified] : 01/11/24 - normal sinus rhythm, frequent PVCs [de-identified] : 11/25/22 - mod LAE, mild MR, severe global LV systolic dysfunction, normal RV size and function, PASP 29 mmHg, LVEF 30% [de-identified] : 11/28/22 (CABG + MV repair) - LIMA-LAD, MV repair, CHANDRIKA occlusion, and extensive biatrial CryoMaze ablation by Tony Reilly MD

## 2024-03-16 ENCOUNTER — RX RENEWAL (OUTPATIENT)
Age: 64
End: 2024-03-16

## 2024-03-16 RX ORDER — LOSARTAN POTASSIUM 25 MG/1
25 TABLET, FILM COATED ORAL
Qty: 90 | Refills: 3 | Status: ACTIVE | COMMUNITY
Start: 2023-02-10 | End: 1900-01-01

## 2024-06-06 ENCOUNTER — NON-APPOINTMENT (OUTPATIENT)
Age: 64
End: 2024-06-06

## 2024-06-06 ENCOUNTER — APPOINTMENT (OUTPATIENT)
Dept: CARDIOLOGY | Facility: CLINIC | Age: 64
End: 2024-06-06
Payer: COMMERCIAL

## 2024-06-06 VITALS
SYSTOLIC BLOOD PRESSURE: 132 MMHG | BODY MASS INDEX: 34.54 KG/M2 | HEART RATE: 72 BPM | DIASTOLIC BLOOD PRESSURE: 75 MMHG | WEIGHT: 255 LBS | OXYGEN SATURATION: 96 % | HEIGHT: 72 IN

## 2024-06-06 DIAGNOSIS — I49.3 VENTRICULAR PREMATURE DEPOLARIZATION: ICD-10-CM

## 2024-06-06 DIAGNOSIS — I48.0 PAROXYSMAL ATRIAL FIBRILLATION: ICD-10-CM

## 2024-06-06 DIAGNOSIS — Z79.2 LONG TERM (CURRENT) USE OF ANTIBIOTICS: ICD-10-CM

## 2024-06-06 DIAGNOSIS — I25.10 ATHEROSCLEROTIC HEART DISEASE OF NATIVE CORONARY ARTERY W/OUT ANGINA PECTORIS: ICD-10-CM

## 2024-06-06 DIAGNOSIS — I50.22 CHRONIC SYSTOLIC (CONGESTIVE) HEART FAILURE: ICD-10-CM

## 2024-06-06 PROCEDURE — G2211 COMPLEX E/M VISIT ADD ON: CPT | Mod: NC

## 2024-06-06 PROCEDURE — 93000 ELECTROCARDIOGRAM COMPLETE: CPT

## 2024-06-06 PROCEDURE — 99214 OFFICE O/P EST MOD 30 MIN: CPT | Mod: 25

## 2024-06-06 RX ORDER — AMOXICILLIN 500 MG/1
500 CAPSULE ORAL
Qty: 4 | Refills: 2 | Status: ACTIVE | COMMUNITY
Start: 2024-06-06 | End: 1900-01-01

## 2024-06-06 RX ORDER — METOPROLOL SUCCINATE 25 MG/1
25 TABLET, EXTENDED RELEASE ORAL DAILY
Qty: 90 | Refills: 3 | Status: DISCONTINUED | COMMUNITY
Start: 2023-06-29 | End: 2024-06-06

## 2024-06-10 NOTE — CARDIOLOGY SUMMARY
[de-identified] : 06/06/24 - normal sinus rhythm, frequent PVCs [de-identified] : 11/25/22 - mod LAE, mild MR, severe global LV systolic dysfunction, normal RV size and function, PASP 29 mmHg, LVEF 30% [de-identified] : 11/28/22 (CABG + MV repair) - LIMA-LAD, MV repair, CHANDRIKA occlusion, and extensive biatrial CryoMaze ablation by Tony Reilly MD

## 2024-06-10 NOTE — DISCUSSION/SUMMARY
[Paroxysmal Atrial Fibrillation] : paroxysmal atrial fibrillation [Uncontrolled Ventricular Response] : uncontrolled ventricular response [Medication Changes Per Orders] : Medication changes are as documented in orders [Coronary Artery Disease] : coronary artery disease [Systolic Heart Failure] : systolic heart failure [Stable] : stable [Compensated] : compensated [FreeTextEntry1] : Currently stable from a cardiovascular standpoint. Normotensive. Systolic heart failure likely from nonischemic cardiomyopathy (LVEF 30%). Currently appears euvolemic. Stable CAD (s/p CABG x1 vessel). s/p MV repair, cryoablation MAZE and CHANDRIKA closure. History of paroxysmal atrial fibrillation post-op. Currently in sinus rhythm with frequent PVCs. Continue current medications including metoprolol succinate twice daily (50 mg in AM, 25 mg in PM), losartan, apixaban, and furosemide 40 mg. Antibiotic prophylaxis prescribed for anticipated dental work. ECG completed today and reviewed (findings as noted above). Low salt diet and daily weights advised. Will schedule an echo to reassess his cardiac structures and function. Follow up in 4 months. [EKG obtained to assist in diagnosis and management of assessed problem(s)] : EKG obtained to assist in diagnosis and management of assessed problem(s)

## 2024-07-11 ENCOUNTER — OUTPATIENT (OUTPATIENT)
Dept: OUTPATIENT SERVICES | Facility: HOSPITAL | Age: 64
LOS: 1 days | End: 2024-07-11
Payer: MEDICARE

## 2024-07-11 ENCOUNTER — RESULT REVIEW (OUTPATIENT)
Age: 64
End: 2024-07-11

## 2024-07-11 ENCOUNTER — APPOINTMENT (OUTPATIENT)
Dept: CV DIAGNOSITCS | Facility: HOSPITAL | Age: 64
End: 2024-07-11

## 2024-07-11 DIAGNOSIS — I50.22 CHRONIC SYSTOLIC (CONGESTIVE) HEART FAILURE: ICD-10-CM

## 2024-07-11 PROCEDURE — 93306 TTE W/DOPPLER COMPLETE: CPT | Mod: 26

## 2024-07-12 ENCOUNTER — NON-APPOINTMENT (OUTPATIENT)
Age: 64
End: 2024-07-12

## 2024-10-31 ENCOUNTER — NON-APPOINTMENT (OUTPATIENT)
Age: 64
End: 2024-10-31

## 2024-10-31 ENCOUNTER — APPOINTMENT (OUTPATIENT)
Dept: CARDIOLOGY | Facility: CLINIC | Age: 64
End: 2024-10-31
Payer: COMMERCIAL

## 2024-10-31 VITALS
OXYGEN SATURATION: 96 % | SYSTOLIC BLOOD PRESSURE: 89 MMHG | WEIGHT: 282 LBS | DIASTOLIC BLOOD PRESSURE: 60 MMHG | HEART RATE: 78 BPM | BODY MASS INDEX: 38.19 KG/M2 | HEIGHT: 72 IN

## 2024-10-31 VITALS — SYSTOLIC BLOOD PRESSURE: 157 MMHG | DIASTOLIC BLOOD PRESSURE: 88 MMHG

## 2024-10-31 DIAGNOSIS — I49.3 VENTRICULAR PREMATURE DEPOLARIZATION: ICD-10-CM

## 2024-10-31 DIAGNOSIS — I48.0 PAROXYSMAL ATRIAL FIBRILLATION: ICD-10-CM

## 2024-10-31 DIAGNOSIS — I25.10 ATHEROSCLEROTIC HEART DISEASE OF NATIVE CORONARY ARTERY W/OUT ANGINA PECTORIS: ICD-10-CM

## 2024-10-31 DIAGNOSIS — I50.22 CHRONIC SYSTOLIC (CONGESTIVE) HEART FAILURE: ICD-10-CM

## 2024-10-31 PROCEDURE — 99214 OFFICE O/P EST MOD 30 MIN: CPT

## 2024-10-31 PROCEDURE — 93000 ELECTROCARDIOGRAM COMPLETE: CPT

## 2024-10-31 PROCEDURE — G2211 COMPLEX E/M VISIT ADD ON: CPT | Mod: NC

## 2024-11-30 ENCOUNTER — RX RENEWAL (OUTPATIENT)
Age: 64
End: 2024-11-30

## 2024-12-30 ENCOUNTER — RX RENEWAL (OUTPATIENT)
Age: 64
End: 2024-12-30

## 2024-12-30 RX ORDER — ASPIRIN 81 MG/1
81 TABLET, COATED ORAL
Qty: 90 | Refills: 3 | Status: ACTIVE | COMMUNITY
Start: 2024-12-30 | End: 1900-01-01

## 2025-02-27 ENCOUNTER — APPOINTMENT (OUTPATIENT)
Dept: CARDIOLOGY | Facility: CLINIC | Age: 65
End: 2025-02-27
Payer: COMMERCIAL

## 2025-02-27 ENCOUNTER — NON-APPOINTMENT (OUTPATIENT)
Age: 65
End: 2025-02-27

## 2025-02-27 VITALS
TEMPERATURE: 98.4 F | WEIGHT: 277 LBS | OXYGEN SATURATION: 95 % | BODY MASS INDEX: 37.52 KG/M2 | SYSTOLIC BLOOD PRESSURE: 127 MMHG | HEART RATE: 105 BPM | HEIGHT: 72 IN | DIASTOLIC BLOOD PRESSURE: 71 MMHG

## 2025-02-27 DIAGNOSIS — I48.0 PAROXYSMAL ATRIAL FIBRILLATION: ICD-10-CM

## 2025-02-27 DIAGNOSIS — I25.10 ATHEROSCLEROTIC HEART DISEASE OF NATIVE CORONARY ARTERY W/OUT ANGINA PECTORIS: ICD-10-CM

## 2025-02-27 DIAGNOSIS — I49.3 VENTRICULAR PREMATURE DEPOLARIZATION: ICD-10-CM

## 2025-02-27 DIAGNOSIS — I50.22 CHRONIC SYSTOLIC (CONGESTIVE) HEART FAILURE: ICD-10-CM

## 2025-02-27 PROCEDURE — G2211 COMPLEX E/M VISIT ADD ON: CPT | Mod: NC

## 2025-02-27 PROCEDURE — 99214 OFFICE O/P EST MOD 30 MIN: CPT

## 2025-02-27 PROCEDURE — 93000 ELECTROCARDIOGRAM COMPLETE: CPT

## 2025-07-31 ENCOUNTER — APPOINTMENT (OUTPATIENT)
Dept: CARDIOLOGY | Facility: CLINIC | Age: 65
End: 2025-07-31
Payer: COMMERCIAL

## 2025-07-31 VITALS
HEIGHT: 72 IN | WEIGHT: 277 LBS | BODY MASS INDEX: 37.52 KG/M2 | HEART RATE: 70 BPM | SYSTOLIC BLOOD PRESSURE: 127 MMHG | OXYGEN SATURATION: 96 % | DIASTOLIC BLOOD PRESSURE: 77 MMHG

## 2025-07-31 DIAGNOSIS — I50.22 CHRONIC SYSTOLIC (CONGESTIVE) HEART FAILURE: ICD-10-CM

## 2025-07-31 DIAGNOSIS — I48.0 PAROXYSMAL ATRIAL FIBRILLATION: ICD-10-CM

## 2025-07-31 DIAGNOSIS — I49.3 VENTRICULAR PREMATURE DEPOLARIZATION: ICD-10-CM

## 2025-07-31 DIAGNOSIS — I25.10 ATHEROSCLEROTIC HEART DISEASE OF NATIVE CORONARY ARTERY W/OUT ANGINA PECTORIS: ICD-10-CM

## 2025-07-31 PROCEDURE — 93000 ELECTROCARDIOGRAM COMPLETE: CPT

## 2025-07-31 PROCEDURE — 99214 OFFICE O/P EST MOD 30 MIN: CPT

## 2025-07-31 PROCEDURE — G2211 COMPLEX E/M VISIT ADD ON: CPT | Mod: NC
